# Patient Record
Sex: FEMALE | Race: WHITE | Employment: OTHER | RURAL
[De-identification: names, ages, dates, MRNs, and addresses within clinical notes are randomized per-mention and may not be internally consistent; named-entity substitution may affect disease eponyms.]

---

## 2017-01-19 RX ORDER — OMEPRAZOLE 20 MG/1
20 CAPSULE, DELAYED RELEASE ORAL DAILY
Qty: 90 CAP | Refills: 1 | Status: SHIPPED | OUTPATIENT
Start: 2017-01-19 | End: 2017-08-27

## 2017-01-19 RX ORDER — OMEPRAZOLE 20 MG/1
CAPSULE, DELAYED RELEASE ORAL
Qty: 90 CAP | Refills: 1 | Status: SHIPPED | OUTPATIENT
Start: 2017-01-19 | End: 2017-04-18 | Stop reason: SDUPTHER

## 2017-02-23 DIAGNOSIS — E78.00 PURE HYPERCHOLESTEROLEMIA: ICD-10-CM

## 2017-02-23 RX ORDER — ROSUVASTATIN CALCIUM 20 MG/1
TABLET, COATED ORAL
Qty: 90 TAB | Refills: 1 | Status: SHIPPED | OUTPATIENT
Start: 2017-02-23 | End: 2017-08-19 | Stop reason: SDUPTHER

## 2017-04-05 ENCOUNTER — OFFICE VISIT (OUTPATIENT)
Dept: FAMILY MEDICINE CLINIC | Age: 82
End: 2017-04-05

## 2017-04-05 VITALS
TEMPERATURE: 97.6 F | RESPIRATION RATE: 18 BRPM | WEIGHT: 129 LBS | OXYGEN SATURATION: 91 % | BODY MASS INDEX: 21.49 KG/M2 | HEIGHT: 65 IN | SYSTOLIC BLOOD PRESSURE: 125 MMHG | HEART RATE: 47 BPM | DIASTOLIC BLOOD PRESSURE: 81 MMHG

## 2017-04-05 DIAGNOSIS — I10 ESSENTIAL HYPERTENSION, BENIGN: ICD-10-CM

## 2017-04-05 DIAGNOSIS — G89.29 CHRONIC RIGHT SHOULDER PAIN: ICD-10-CM

## 2017-04-05 DIAGNOSIS — M25.511 CHRONIC RIGHT SHOULDER PAIN: ICD-10-CM

## 2017-04-05 DIAGNOSIS — E11.9 TYPE 2 DIABETES MELLITUS WITHOUT COMPLICATION, WITHOUT LONG-TERM CURRENT USE OF INSULIN (HCC): Primary | ICD-10-CM

## 2017-04-05 DIAGNOSIS — E78.00 PURE HYPERCHOLESTEROLEMIA: ICD-10-CM

## 2017-04-05 DIAGNOSIS — Z87.01 H/O: PNEUMONIA: ICD-10-CM

## 2017-04-05 RX ORDER — ALBUTEROL SULFATE 90 UG/1
POWDER, METERED RESPIRATORY (INHALATION)
Refills: 0 | COMMUNITY
Start: 2017-02-27 | End: 2017-08-29 | Stop reason: SDUPTHER

## 2017-04-05 NOTE — PROGRESS NOTES
Identified pt with two pt identifiers(name and ). Chief Complaint   Patient presents with    Follow-up    Shoulder Pain     Right shoulder to back        Health Maintenance Due   Topic    EYE EXAM RETINAL OR DILATED Q1     DTaP/Tdap/Td series (1 - Tdap)    GLAUCOMA SCREENING Q2Y     FOOT EXAM Q1     MICROALBUMIN Q1     HEMOGLOBIN A1C Q6M        Wt Readings from Last 3 Encounters:   17 129 lb (58.5 kg)   16 135 lb 14.4 oz (61.6 kg)   16 132 lb (59.9 kg)     Temp Readings from Last 3 Encounters:   17 97.6 °F (36.4 °C)   16 97.3 °F (36.3 °C) (Oral)   16 97.9 °F (36.6 °C) (Oral)     BP Readings from Last 3 Encounters:   17 125/81   16 126/63   16 106/58     Pulse Readings from Last 3 Encounters:   17 (!) 47   16 66   16 64         Learning Assessment:  :     Learning Assessment 2014   PRIMARY LEARNER Patient   HIGHEST LEVEL OF EDUCATION - PRIMARY LEARNER  DID NOT GRADUATE HIGH SCHOOL   BARRIERS PRIMARY LEARNER NONE   CO-LEARNER CAREGIVER No   PRIMARY LANGUAGE ENGLISH   LEARNER PREFERENCE PRIMARY LISTENING   ANSWERED BY patient    RELATIONSHIP SELF       Depression Screening:  :     PHQ 2 / 9, over the last two weeks 2017   Little interest or pleasure in doing things Not at all   Feeling down, depressed or hopeless Not at all   Total Score PHQ 2 0       Fall Risk Assessment:  :     Fall Risk Assessment, last 12 mths 2017   Able to walk? Yes   Fall in past 12 months? Yes   Fall with injury? No   Number of falls in past 12 months 1   Fall Risk Score 1       Abuse Screening:  :     Abuse Screening Questionnaire 2017   Do you ever feel afraid of your partner? N N   Are you in a relationship with someone who physically or mentally threatens you? N N   Is it safe for you to go home? Y Y       Coordination of Care Questionnaire:  :     1) Have you been to an emergency room, urgent care clinic since your last visit? no   Hospitalized since your last visit? no             2) Have you seen or consulted any other health care providers outside of [facility] since your last visit? yes urgent care  (Include any pap smears or colon screenings in this section.)    3) Do you have an Advance Directive on file? yes  Are you interested in receiving information about Advance Directives? No    Reviewed record in preparation for visit and have obtained necessary documentation. Medication reconciliation up to date and corrected with patient at this time.

## 2017-04-05 NOTE — MR AVS SNAPSHOT
Visit Information Date & Time Provider Department Dept. Phone Encounter #  
 4/5/2017 11:15 AM Richy Vee Khurram [phone] [id number] Upcoming Health Maintenance Date Due  
 EYE EXAM RETINAL OR DILATED Q1 10/3/2014 GLAUCOMA SCREENING Q2Y 10/3/2015 FOOT EXAM Q1 2/26/2017 MICROALBUMIN Q1 2/26/2017 HEMOGLOBIN A1C Q6M 4/5/2017 MEDICARE YEARLY EXAM 9/28/2017 LIPID PANEL Q1 10/5/2017 DTaP/Tdap/Td series (2 - Td) 4/5/2027 Allergies as of 4/5/2017  Review Complete On: 4/5/2017 By: Donna Moore LPN Severity Noted Reaction Type Reactions Advil Pm  [Ibuprofen-diphenhydramine Cit] Medium 09/27/2016 Other reaction(s): Adverse reaction to substance , Hives Codeine  07/14/2009    Other (comments) Causes stomach pains Current Immunizations  Reviewed on 4/5/2017 Name Date H1N1 FLU VACCINE 2/11/2010 Influenza High Dose Vaccine PF 9/27/2016 11:30 AM  
 Influenza Vaccine 9/30/2014, 9/28/2013 Influenza Vaccine Split 10/2/2012  2:22 PM, 11/19/2009 Influenza Vaccine Whole 12/20/2010 Pneumococcal Conjugate (PCV-13) 4/9/2015 11:15 AM  
 Pneumococcal Vaccine (Unspecified Type) 4/8/2008 Td, Adsorbed PF 4/9/2015 11:17 AM  
 Zoster Vaccine, Live 4/13/2015 Reviewed by Ambrosio Vidal MD on 4/5/2017 at 11:48 AM  
You Were Diagnosed With   
  
 Codes Comments Type 2 diabetes mellitus without complication, without long-term current use of insulin (HCC)    -  Primary ICD-10-CM: E11.9 ICD-9-CM: 250.00 Essential hypertension, benign     ICD-10-CM: I10 
ICD-9-CM: 401.1 Pure hypercholesterolemia     ICD-10-CM: E78.00 ICD-9-CM: 272.0   
 H/O: pneumonia     ICD-10-CM: Z87.01 
ICD-9-CM: V12.61 Chronic right shoulder pain     ICD-10-CM: M25.511, G89.29 ICD-9-CM: 719.41, 338.29 Vitals BP Pulse Temp Resp Height(growth percentile) Weight(growth percentile) 125/81 (BP 1 Location: Right arm, BP Patient Position: Sitting) (!) 47 97.6 °F (36.4 °C) 18 5' 5\" (1.651 m) 129 lb (58.5 kg) SpO2 BMI OB Status Smoking Status 91% 21.47 kg/m2 Postmenopausal Never Smoker BMI and BSA Data Body Mass Index Body Surface Area  
 21.47 kg/m 2 1.64 m 2 Preferred Pharmacy Pharmacy Name Phone [id number]- [address], [id number] [place] [address]  [phone] Your Updated Medication List  
  
   
This list is accurate as of: 4/5/17 11:58 AM.  Always use your most recent med list.  
  
  
  
  
 alendronate 70 mg tablet Commonly known as:  FOSAMAX TAKE 1 TABLET BY MOUTH EVERY SUNDAY FOR 30 DAYS  
  
 aspirin delayed-release 81 mg tablet  
take 81 mg by mouth daily. Blood-Glucose Meter monitoring kit ONE TOUCH GLUCOMETER. CALCIUM 500 500 mg calcium (1,250 mg) tablet Generic drug:  calcium carbonate Take 1,250 mg by mouth daily. cetirizine 10 mg tablet Commonly known as:  ZYRTEC Take 10 mg by mouth daily. glucose blood VI test strips strip Commonly known as:  blood glucose test  
One touch ultra - dx E11.9  test blood sugar once daily or as directed by physician Lancets Misc Commonly known as: One Touch Talia Kldelicia Test blood sugar daily  
  
 levothyroxine 50 mcg tablet Commonly known as:  SYNTHROID  
TAKE 1 TABLET BY MOUTH EVERY DAY BEFORE BREAKFAST  
  
 losartan 50 mg tablet Commonly known as:  COZAAR  
TAKE 1 TABLET BY MOUTH EVERY DAY  
  
 * omeprazole 20 mg capsule Commonly known as:  PRILOSEC Take 1 Cap by mouth daily for 30 days. * omeprazole 20 mg capsule Commonly known as:  PRILOSEC  
TAKE 2 CAPSULES BY MOUTH EVERY DAY  
  
 PROAIR [address] 90 mcg/actuation Aepb Generic drug:  albuterol sulfate USE 2 INHALATIONS BY MOUTH EVERY 4 TO 6 HOURS AS NEEDED PROTEIN NUTRITIONAL SHAKE PO Take 1 Bottle by mouth daily. rosuvastatin 20 mg tablet Commonly known as:  CRESTOR  
TAKE 1 TABLET BY MOUTH EVERY DAY  
  
 VITAMIN D3 1,000 unit tablet Generic drug:  cholecalciferol Take 1,000 Units by mouth daily. * Notice: This list has 2 medication(s) that are the same as other medications prescribed for you. Read the directions carefully, and ask your doctor or other care provider to review them with you. We Performed the Following CBC WITH AUTOMATED DIFF [31483 CPT(R)] CRP, HIGH SENSITIVITY [62947 CPT(R)] HEMOGLOBIN A1C WITH EAG [92685 CPT(R)] LIPID PANEL [67054 CPT(R)] METABOLIC PANEL, COMPREHENSIVE [39361 CPT(R)] MICROALBUMIN, UR, RAND W/ MICROALBUMIN/CREA RATIO [id number] CPT(R)] REFERRAL TO ORTHOPEDICS [AND307 Custom] Comments:  
 Please evaluate patient for worsening R-shoulder pain and not getting better. Referral Information Referral ID Referred By Referred To  
  
 [id number] [address] Phone: [phone] Fax: [phone] Visits Status Start Date End Date 1 New Request 4/5/17 4/5/18 If your referral has a status of pending review or denied, additional information will be sent to support the outcome of this decision. Introducing [facility]! [employer] introduces [employer] patient portal. Now you can access parts of your medical record, email your doctor's office, and request medication refills online. 1. In your internet browser, go to [email or web address] [email or web address] 2. Click on the First Time User? Click Here link in the Sign In box. You will see the New Member Sign Up page. 3. Enter your [employer] Access Code exactly as it appears below. You will not need to use this code after youve completed the sign-up process. If you do not sign up before the expiration date, you must request a new code.  
 
· [employer] Access Code: [email or web address] 
 Expires: 7/4/2017 11:58 AM 
 
4. Enter the last four digits of your Social Security Number (xxxx) and Date of Birth (mm/dd/yyyy) as indicated and click Submit. You will be taken to the next sign-up page. 5. Create a [employer] ID. This will be your [employer] login ID and cannot be changed, so think of one that is secure and easy to remember. 6. Create a [employer] password. You can change your password at any time. 7. Enter your Password Reset Question and Answer. This can be used at a later time if you forget your password. 8. Enter your e-mail address. You will receive e-mail notification when new information is available in [address] 9. Click Sign Up. You can now view and download portions of your medical record. 10. Click the Download Summary menu link to download a portable copy of your medical information. If you have questions, please visit the Frequently Asked Questions section of the [employer] website. Remember, [employer] is NOT to be used for urgent needs. For medical emergencies, dial 911. Now available from your iPhone and Android! Please provide this summary of care documentation to your next provider. Your primary care clinician is listed as [address]. If you have any questions after today's visit, please call [phone].

## 2017-04-07 ENCOUNTER — HOSPITAL ENCOUNTER (OUTPATIENT)
Dept: LAB | Age: 82
Discharge: HOME | End: 2017-04-07
Payer: MEDICARE

## 2017-04-07 ENCOUNTER — LAB ONLY (OUTPATIENT)
Dept: FAMILY MEDICINE CLINIC | Age: 82
End: 2017-04-07

## 2017-04-07 PROCEDURE — 36415 COLL VENOUS BLD VENIPUNCTURE: CPT

## 2017-04-07 PROCEDURE — 80061 LIPID PANEL: CPT

## 2017-04-07 PROCEDURE — 86141 C-REACTIVE PROTEIN HS: CPT

## 2017-04-07 PROCEDURE — 85025 COMPLETE CBC W/AUTO DIFF WBC: CPT

## 2017-04-07 PROCEDURE — 82043 UR ALBUMIN QUANTITATIVE: CPT

## 2017-04-07 PROCEDURE — 80053 COMPREHEN METABOLIC PANEL: CPT

## 2017-04-07 PROCEDURE — 83036 HEMOGLOBIN GLYCOSYLATED A1C: CPT

## 2017-04-08 LAB
ALBUMIN SERPL-MCNC: 3.9 G/DL (ref 3.2–4.6)
ALBUMIN/CREAT UR: 6 MG/G CREAT (ref 0–30)
ALBUMIN/GLOB SERPL: 1.6 {RATIO} (ref 1.2–2.2)
ALP SERPL-CCNC: 62 IU/L (ref 39–117)
ALT SERPL-CCNC: 16 IU/L (ref 0–32)
AST SERPL-CCNC: 31 IU/L (ref 0–40)
BASOPHILS # BLD AUTO: 0.1 X10E3/UL (ref 0–0.2)
BASOPHILS NFR BLD AUTO: 1 %
BILIRUB SERPL-MCNC: 0.4 MG/DL (ref 0–1.2)
BUN SERPL-MCNC: 12 MG/DL (ref 10–36)
BUN/CREAT SERPL: 14 (ref 12–28)
CALCIUM SERPL-MCNC: 8.7 MG/DL (ref 8.7–10.3)
CHLORIDE SERPL-SCNC: 104 MMOL/L (ref 96–106)
CHOLEST SERPL-MCNC: 113 MG/DL (ref 100–199)
CO2 SERPL-SCNC: 25 MMOL/L (ref 18–29)
CREAT SERPL-MCNC: 0.84 MG/DL (ref 0.57–1)
CREAT UR-MCNC: 62.9 MG/DL
CRP SERPL HS-MCNC: 0.63 MG/L (ref 0–3)
EOSINOPHIL # BLD AUTO: 0.1 X10E3/UL (ref 0–0.4)
EOSINOPHIL NFR BLD AUTO: 2 %
ERYTHROCYTE [DISTWIDTH] IN BLOOD BY AUTOMATED COUNT: 14.7 % (ref 12.3–15.4)
EST. AVERAGE GLUCOSE BLD GHB EST-MCNC: 123 MG/DL
GLOBULIN SER CALC-MCNC: 2.5 G/DL (ref 1.5–4.5)
GLUCOSE SERPL-MCNC: 93 MG/DL (ref 65–99)
HBA1C MFR BLD: 5.9 % (ref 4.8–5.6)
HCT VFR BLD AUTO: 44 % (ref 34–46.6)
HDLC SERPL-MCNC: 43 MG/DL
HGB BLD-MCNC: 14.3 G/DL (ref 11.1–15.9)
IMM GRANULOCYTES # BLD: 0 X10E3/UL (ref 0–0.1)
IMM GRANULOCYTES NFR BLD: 0 %
INTERPRETATION, 910389: NORMAL
LDLC SERPL CALC-MCNC: 39 MG/DL (ref 0–99)
LYMPHOCYTES # BLD AUTO: 1.9 X10E3/UL (ref 0.7–3.1)
LYMPHOCYTES NFR BLD AUTO: 29 %
Lab: NORMAL
MCH RBC QN AUTO: 30 PG (ref 26.6–33)
MCHC RBC AUTO-ENTMCNC: 32.5 G/DL (ref 31.5–35.7)
MCV RBC AUTO: 92 FL (ref 79–97)
MICROALBUMIN UR-MCNC: 3.8 UG/ML
MONOCYTES # BLD AUTO: 0.4 X10E3/UL (ref 0.1–0.9)
MONOCYTES NFR BLD AUTO: 6 %
NEUTROPHILS # BLD AUTO: 4 X10E3/UL (ref 1.4–7)
NEUTROPHILS NFR BLD AUTO: 62 %
PLATELET # BLD AUTO: 204 X10E3/UL (ref 150–379)
POTASSIUM SERPL-SCNC: 4.5 MMOL/L (ref 3.5–5.2)
PROT SERPL-MCNC: 6.4 G/DL (ref 6–8.5)
RBC # BLD AUTO: 4.77 X10E6/UL (ref 3.77–5.28)
SODIUM SERPL-SCNC: 143 MMOL/L (ref 134–144)
TRIGL SERPL-MCNC: 155 MG/DL (ref 0–149)
VLDLC SERPL CALC-MCNC: 31 MG/DL (ref 5–40)
WBC # BLD AUTO: 6.6 X10E3/UL (ref 3.4–10.8)

## 2017-04-13 ENCOUNTER — TELEPHONE (OUTPATIENT)
Dept: FAMILY MEDICINE CLINIC | Age: 82
End: 2017-04-13

## 2017-04-13 NOTE — PROGRESS NOTES
Labs stable. No major concerns. Continue with current medications. Return in 6-months for follow up and labs.

## 2017-04-13 NOTE — TELEPHONE ENCOUNTER
----- Message from Anais Rivero MD sent at 4/13/2017  1:16 PM EDT -----  Labs stable. No major concerns. Continue with current medications. Return in 6-months for follow up and labs.

## 2017-04-18 RX ORDER — BLOOD SUGAR DIAGNOSTIC
STRIP MISCELLANEOUS
Qty: 100 STRIP | Refills: 0 | Status: SHIPPED | OUTPATIENT
Start: 2017-04-18 | End: 2017-04-18 | Stop reason: SDUPTHER

## 2017-04-18 RX ORDER — OMEPRAZOLE 20 MG/1
CAPSULE, DELAYED RELEASE ORAL
Qty: 90 CAP | Refills: 1 | Status: SHIPPED | OUTPATIENT
Start: 2017-04-18 | End: 2017-07-16 | Stop reason: SDUPTHER

## 2017-04-20 RX ORDER — LANCETS
EACH MISCELLANEOUS
Qty: 1 PACKAGE | Refills: 11 | Status: SHIPPED | OUTPATIENT
Start: 2017-04-20 | End: 2017-10-10 | Stop reason: ALTCHOICE

## 2017-04-21 NOTE — PROGRESS NOTES
HISTORY OF PRESENT ILLNESS  Stephon Green is a 80 y.o. female. HPI Comments: Who presents today for routine follow up. She also continues to c/o right shoulder to back pain. Known to have OA which flares. She is hesitant to consider any procedures, but does have an appointment coming up with ortho in the next few weeks. Follow-up     Shoulder Pain            ROS:  Review of Systems   All other systems reviewed and are negative. Visit Vitals    /81 (BP 1 Location: Right arm, BP Patient Position: Sitting)  Comment: auto cuff    Pulse (!) 47    Temp 97.6 °F (36.4 °C)    Resp 18    Ht 5' 5\" (1.651 m)    Wt 129 lb (58.5 kg)    SpO2 91%    BMI 21.47 kg/m2   Physical Exam   HENT:   Head: Atraumatic. Eyes: Conjunctivae are normal. Pupils are equal, round, and reactive to light. Cardiovascular: Normal rate and regular rhythm. Pulmonary/Chest: Effort normal and breath sounds normal.   Musculoskeletal: Normal range of motion. She exhibits tenderness (right shoulder). Neurological: She is alert. Skin: Skin is warm. Nursing note and vitals reviewed. ASSESSMENT and PLAN    ICD-10-CM ICD-9-CM    1. Type 2 diabetes mellitus without complication, without long-term current use of insulin (HCC) E11.9 250.00 MICROALBUMIN, UR, RAND W/ MICROALBUMIN/CREA RATIO      HEMOGLOBIN A1C WITH EAG   2. Essential hypertension, benign F11 561.9 METABOLIC PANEL, COMPREHENSIVE   3. Pure hypercholesterolemia E78.00 272.0 LIPID PANEL      CRP, HIGH SENSITIVITY      METABOLIC PANEL, COMPREHENSIVE      CBC WITH AUTOMATED DIFF   4. H/O: pneumonia Z87.01 V12.61    5. Chronic right shoulder pain M25.511 719.41 REFERRAL TO ORTHOPEDICS    G89.29 338.29      Will obtain labs as outlined above. Encouraged her to see ortho to determine if she could benefit from an injection. Would like to consider PT as well, but she doesn't feel it would be helpful. Will advise when her labs return.      Pt verbalizes understanding of plan of care and denies further questions or concerns at this time. Follow-up Disposition:  Return if symptoms worsen or fail to improve.

## 2017-05-16 RX ORDER — LOSARTAN POTASSIUM 50 MG/1
TABLET ORAL
Qty: 90 TAB | Refills: 1 | Status: SHIPPED | OUTPATIENT
Start: 2017-05-16 | End: 2017-11-09 | Stop reason: SDUPTHER

## 2017-06-02 RX ORDER — LEVOTHYROXINE SODIUM 50 UG/1
TABLET ORAL
Qty: 90 TAB | Refills: 1 | Status: SHIPPED | OUTPATIENT
Start: 2017-06-02 | End: 2017-11-26 | Stop reason: SDUPTHER

## 2017-07-17 RX ORDER — OMEPRAZOLE 20 MG/1
CAPSULE, DELAYED RELEASE ORAL
Qty: 90 CAP | Refills: 1 | Status: SHIPPED | OUTPATIENT
Start: 2017-07-17 | End: 2017-10-17 | Stop reason: SDUPTHER

## 2017-08-19 DIAGNOSIS — E78.00 PURE HYPERCHOLESTEROLEMIA: ICD-10-CM

## 2017-08-21 RX ORDER — ROSUVASTATIN CALCIUM 20 MG/1
TABLET, COATED ORAL
Qty: 90 TAB | Refills: 1 | Status: SHIPPED | OUTPATIENT
Start: 2017-08-21 | End: 2018-02-12 | Stop reason: SDUPTHER

## 2017-08-23 ENCOUNTER — APPOINTMENT (OUTPATIENT)
Dept: GENERAL RADIOLOGY | Age: 82
DRG: 871 | End: 2017-08-23
Attending: EMERGENCY MEDICINE
Payer: MEDICARE

## 2017-08-23 ENCOUNTER — APPOINTMENT (OUTPATIENT)
Dept: CT IMAGING | Age: 82
DRG: 871 | End: 2017-08-23
Attending: FAMILY MEDICINE
Payer: MEDICARE

## 2017-08-23 ENCOUNTER — HOSPITAL ENCOUNTER (INPATIENT)
Age: 82
LOS: 3 days | Discharge: HOME | DRG: 871 | End: 2017-08-27
Attending: EMERGENCY MEDICINE | Admitting: FAMILY MEDICINE
Payer: MEDICARE

## 2017-08-23 DIAGNOSIS — A41.9 SEPSIS, DUE TO UNSPECIFIED ORGANISM: ICD-10-CM

## 2017-08-23 DIAGNOSIS — J18.9 COMMUNITY ACQUIRED PNEUMONIA: Primary | ICD-10-CM

## 2017-08-23 LAB
ALBUMIN SERPL-MCNC: 3.1 G/DL (ref 3.5–5)
ALBUMIN/GLOB SERPL: 0.9 {RATIO} (ref 1.1–2.2)
ALP SERPL-CCNC: 57 U/L (ref 45–117)
ALT SERPL-CCNC: 25 U/L (ref 12–78)
ANION GAP SERPL CALC-SCNC: 7 MMOL/L (ref 5–15)
APPEARANCE UR: CLEAR
AST SERPL-CCNC: 42 U/L (ref 15–37)
BACTERIA URNS QL MICRO: NEGATIVE /HPF
BASOPHILS # BLD: 0 K/UL (ref 0–0.1)
BASOPHILS NFR BLD: 0 % (ref 0–1)
BILIRUB SERPL-MCNC: 0.3 MG/DL (ref 0.2–1)
BILIRUB UR QL: NEGATIVE
BUN SERPL-MCNC: 18 MG/DL (ref 6–20)
BUN/CREAT SERPL: 15 (ref 12–20)
CALCIUM SERPL-MCNC: 8.5 MG/DL (ref 8.5–10.1)
CHLORIDE SERPL-SCNC: 102 MMOL/L (ref 97–108)
CO2 SERPL-SCNC: 26 MMOL/L (ref 21–32)
COLOR UR: ABNORMAL
CREAT SERPL-MCNC: 1.24 MG/DL (ref 0.55–1.02)
EOSINOPHIL # BLD: 0 K/UL (ref 0–0.4)
EOSINOPHIL NFR BLD: 0 % (ref 0–7)
EPITH CASTS URNS QL MICRO: ABNORMAL /LPF
ERYTHROCYTE [DISTWIDTH] IN BLOOD BY AUTOMATED COUNT: 13.6 % (ref 11.5–14.5)
GLOBULIN SER CALC-MCNC: 3.4 G/DL (ref 2–4)
GLUCOSE SERPL-MCNC: 149 MG/DL (ref 65–100)
GLUCOSE UR STRIP.AUTO-MCNC: NEGATIVE MG/DL
HCT VFR BLD AUTO: 40.7 % (ref 35–47)
HGB BLD-MCNC: 13.4 G/DL (ref 11.5–16)
HGB UR QL STRIP: ABNORMAL
HYALINE CASTS URNS QL MICRO: ABNORMAL /LPF (ref 0–5)
KETONES UR QL STRIP.AUTO: 15 MG/DL
LACTATE SERPL-SCNC: 1.4 MMOL/L (ref 0.4–2)
LEUKOCYTE ESTERASE UR QL STRIP.AUTO: ABNORMAL
LYMPHOCYTES # BLD: 1.4 K/UL (ref 0.8–3.5)
LYMPHOCYTES NFR BLD: 11 % (ref 12–49)
MCH RBC QN AUTO: 30.9 PG (ref 26–34)
MCHC RBC AUTO-ENTMCNC: 32.9 G/DL (ref 30–36.5)
MCV RBC AUTO: 93.8 FL (ref 80–99)
MONOCYTES # BLD: 1.1 K/UL (ref 0–1)
MONOCYTES NFR BLD: 8 % (ref 5–13)
NEUTS SEG # BLD: 11.1 K/UL (ref 1.8–8)
NEUTS SEG NFR BLD: 81 % (ref 32–75)
NITRITE UR QL STRIP.AUTO: NEGATIVE
PH UR STRIP: 5 [PH] (ref 5–8)
PLATELET # BLD AUTO: 163 K/UL (ref 150–400)
POTASSIUM SERPL-SCNC: 3.8 MMOL/L (ref 3.5–5.1)
PROT SERPL-MCNC: 6.5 G/DL (ref 6.4–8.2)
PROT UR STRIP-MCNC: 30 MG/DL
RBC # BLD AUTO: 4.34 M/UL (ref 3.8–5.2)
RBC #/AREA URNS HPF: ABNORMAL /HPF (ref 0–5)
SODIUM SERPL-SCNC: 135 MMOL/L (ref 136–145)
SP GR UR REFRACTOMETRY: 1.03 (ref 1–1.03)
TROPONIN I SERPL-MCNC: <0.04 NG/ML
UA: UC IF INDICATED,UAUC: ABNORMAL
UROBILINOGEN UR QL STRIP.AUTO: 0.2 EU/DL (ref 0.2–1)
WBC # BLD AUTO: 13.6 K/UL (ref 3.6–11)
WBC URNS QL MICRO: ABNORMAL /HPF (ref 0–4)

## 2017-08-23 PROCEDURE — 96365 THER/PROPH/DIAG IV INF INIT: CPT

## 2017-08-23 PROCEDURE — 96360 HYDRATION IV INFUSION INIT: CPT

## 2017-08-23 PROCEDURE — 71010 XR CHEST PORT: CPT

## 2017-08-23 PROCEDURE — 71250 CT THORAX DX C-: CPT

## 2017-08-23 PROCEDURE — 87086 URINE CULTURE/COLONY COUNT: CPT | Performed by: EMERGENCY MEDICINE

## 2017-08-23 PROCEDURE — 96375 TX/PRO/DX INJ NEW DRUG ADDON: CPT

## 2017-08-23 PROCEDURE — 94762 N-INVAS EAR/PLS OXIMTRY CONT: CPT

## 2017-08-23 PROCEDURE — 74011000250 HC RX REV CODE- 250: Performed by: EMERGENCY MEDICINE

## 2017-08-23 PROCEDURE — 85025 COMPLETE CBC W/AUTO DIFF WBC: CPT | Performed by: EMERGENCY MEDICINE

## 2017-08-23 PROCEDURE — 80053 COMPREHEN METABOLIC PANEL: CPT | Performed by: EMERGENCY MEDICINE

## 2017-08-23 PROCEDURE — 74011000258 HC RX REV CODE- 258: Performed by: EMERGENCY MEDICINE

## 2017-08-23 PROCEDURE — 81001 URINALYSIS AUTO W/SCOPE: CPT | Performed by: EMERGENCY MEDICINE

## 2017-08-23 PROCEDURE — 94640 AIRWAY INHALATION TREATMENT: CPT

## 2017-08-23 PROCEDURE — 36415 COLL VENOUS BLD VENIPUNCTURE: CPT | Performed by: EMERGENCY MEDICINE

## 2017-08-23 PROCEDURE — 93005 ELECTROCARDIOGRAM TRACING: CPT

## 2017-08-23 PROCEDURE — 83605 ASSAY OF LACTIC ACID: CPT | Performed by: EMERGENCY MEDICINE

## 2017-08-23 PROCEDURE — 84484 ASSAY OF TROPONIN QUANT: CPT | Performed by: EMERGENCY MEDICINE

## 2017-08-23 PROCEDURE — 87040 BLOOD CULTURE FOR BACTERIA: CPT | Performed by: EMERGENCY MEDICINE

## 2017-08-23 PROCEDURE — 99285 EMERGENCY DEPT VISIT HI MDM: CPT

## 2017-08-23 PROCEDURE — 77030029684 HC NEB SM VOL KT MONA -A

## 2017-08-23 PROCEDURE — 74011250636 HC RX REV CODE- 250/636: Performed by: EMERGENCY MEDICINE

## 2017-08-23 RX ORDER — SODIUM CHLORIDE 0.9 % (FLUSH) 0.9 %
5-10 SYRINGE (ML) INJECTION EVERY 8 HOURS
Status: DISCONTINUED | OUTPATIENT
Start: 2017-08-23 | End: 2017-08-27 | Stop reason: HOSPADM

## 2017-08-23 RX ORDER — SODIUM CHLORIDE 9 MG/ML
100 INJECTION, SOLUTION INTRAVENOUS CONTINUOUS
Status: DISCONTINUED | OUTPATIENT
Start: 2017-08-23 | End: 2017-08-25

## 2017-08-23 RX ORDER — SODIUM CHLORIDE 0.9 % (FLUSH) 0.9 %
5-10 SYRINGE (ML) INJECTION AS NEEDED
Status: DISCONTINUED | OUTPATIENT
Start: 2017-08-23 | End: 2017-08-27 | Stop reason: HOSPADM

## 2017-08-23 RX ORDER — HEPARIN SODIUM 5000 [USP'U]/ML
5000 INJECTION, SOLUTION INTRAVENOUS; SUBCUTANEOUS EVERY 8 HOURS
Status: DISCONTINUED | OUTPATIENT
Start: 2017-08-23 | End: 2017-08-24

## 2017-08-23 RX ADMIN — ALBUTEROL SULFATE 1 DOSE: 2.5 SOLUTION RESPIRATORY (INHALATION) at 21:26

## 2017-08-23 RX ADMIN — AZITHROMYCIN MONOHYDRATE 500 MG: 500 INJECTION, POWDER, LYOPHILIZED, FOR SOLUTION INTRAVENOUS at 23:22

## 2017-08-23 RX ADMIN — CEFTRIAXONE SODIUM 1 G: 1 INJECTION, POWDER, FOR SOLUTION INTRAMUSCULAR; INTRAVENOUS at 22:08

## 2017-08-23 RX ADMIN — SODIUM CHLORIDE 1659 ML: 900 INJECTION, SOLUTION INTRAVENOUS at 22:10

## 2017-08-23 NOTE — IP AVS SNAPSHOT
Allan Purcell 
 
 
 [facility] [place] [address] 
[phone] Patient: Yen Jackson MRN: [id number] :[id number] You are allergic to the following Allergen Reactions Advil Pm  (Ibuprofen-Diphenhydramine Cit) Not Noted Other reaction(s): Adverse reaction to substance , Hives Codeine Other (comments) Causes stomach pains Recent Documentation Height Weight Breastfeeding? BMI OB Status Smoking Status 1.6 m 55.3 kg No 21.6 kg/m2 Postmenopausal Never Smoker Unresulted Labs Order Current Status CULTURE, BLOOD, PERIPHERAL Preliminary result CULTURE, BLOOD, PERIPHERAL Preliminary result Emergency Contacts Name Discharge Info Relation Home Work Mobile 301 [facility] CAREGIVER [3] Child [2] [phone] About your hospitalization You were admitted on:  2017 You last received care in the:  [facility] 4M POST SURG ORT 2 You were discharged on:  2017 Unit phone number:  [phone] Why you were hospitalized Your primary diagnosis was:  Sepsis (Hcc) Your diagnoses also included:  Cap (Community Acquired Pneumonia) Providers Seen During Your Hospitalizations Provider Role Specialty Primary office phone Vilma Flowers MD Attending Provider Emergency Medicine [phone] María Velazco MD Attending Provider [facility] [phone] Breonna Wharton MD Attending Provider Family Practice [phone] Your Primary Care Physician (PCP) Primary Care Physician Office Phone Office Fax Jacey Mullins [phone] Follow-up Information Follow up With Details Comments Contact Info Thalia Baez MD Schedule an appointment as soon as possible for a visit in 3 days hospital follow up Margaret  [address] [place] [address] 
[phone] Your Appointments Friday September 29, 2017 10:00 AM EDT Medicare Physical with Giorgi Guzmán MD  
[address] [place] [address]) Margaret [address] [facility]  
[phone] Current Discharge Medication List  
  
START taking these medications Dose & Instructions Dispensing Information Comments Morning Noon Evening Bedtime  
 doxycycline 100 mg tablet Commonly known as:  VIBRA-TABS Your last dose was: Your next dose is:    
   
   
 Dose:  100 mg Take 1 Tab by mouth every twelve (12) hours for 6 days. Quantity:  12 Tab Refills:  0  
     
   
   
   
  
 levalbuterol 1.25 mg/3 mL Nebu Commonly known as:  Nathalia Gall Your last dose was: Your next dose is:    
   
   
 Dose:  1.25 mg  
3 mL by Nebulization route every six (6) hours as needed for up to 30 days. Quantity:  180 mL Refills:  0 CONTINUE these medications which have CHANGED Dose & Instructions Dispensing Information Comments Morning Noon Evening Bedtime  
 omeprazole 20 mg capsule Commonly known as:  PRILOSEC What changed:  Another medication with the same name was removed. Continue taking this medication, and follow the directions you see here. Your last dose was: Your next dose is: TAKE 2 CAPSULES BY MOUTH EVERY DAY Quantity:  90 Cap Refills:  1 CONTINUE these medications which have NOT CHANGED Dose & Instructions Dispensing Information Comments Morning Noon Evening Bedtime  
 alendronate 70 mg tablet Commonly known as:  FOSAMAX Your last dose was: Your next dose is: TAKE 1 TABLET BY MOUTH EVERY SUNDAY FOR 30 DAYS Quantity:  4 Tab Refills:  11  
     
   
   
   
  
 aspirin delayed-release 81 mg tablet Your last dose was: Your next dose is:    
   
   
 Dose:  81 mg  
take 81 mg by mouth daily. Refills:  0 Blood-Glucose Meter monitoring kit Your last dose was: Your next dose is: ONE TOUCH GLUCOMETER. Quantity:  1 Kit Refills:  0  
     
   
   
   
  
 CALCIUM 500 500 mg calcium (1,250 mg) tablet Generic drug:  calcium carbonate Your last dose was: Your next dose is:    
   
   
 Dose:  1250 mg Take 1,250 mg by mouth daily. Refills:  0  
     
   
   
   
  
 cetirizine 10 mg tablet Commonly known as:  ZYRTEC Your last dose was: Your next dose is:    
   
   
 Dose:  10 mg Take 10 mg by mouth daily. Refills:  0  
     
   
   
   
  
 glucose blood VI test strips strip Commonly known as:  ONETOUCH ULTRA TEST Your last dose was: Your next dose is:    
   
   
 TEST ONCE A DAY/Dx Code: E11.9 Quantity:  100 Strip Refills:  1 Lancets Misc Your last dose was: Your next dose is:    
   
   
 TEST ONCE A DAY/Dx Code: E11.9 Quantity:  1 Package Refills:  11  
     
   
   
   
  
 levothyroxine 50 mcg tablet Commonly known as:  SYNTHROID Your last dose was: Your next dose is: TAKE 1 TABLET BY MOUTH EVERY DAY BEFORE BREAKFAST Quantity:  90 Tab Refills:  1  
     
   
   
   
  
 losartan 50 mg tablet Commonly known as:  COZAAR Your last dose was: Your next dose is: TAKE 1 TABLET BY MOUTH EVERY DAY Quantity:  90 Tab Refills:  1 PROAIR RESPICLICK 90 mcg/actuation Aepb Generic drug:  albuterol sulfate Your last dose was: Your next dose is:    
   
   
 USE 2 INHALATIONS BY MOUTH EVERY 4 TO 6 HOURS AS NEEDED Refills:  0 PROTEIN NUTRITIONAL SHAKE PO Your last dose was: Your next dose is:    
   
   
 Dose:  1 Bottle Take 1 Bottle by mouth daily. Refills:  0  
     
   
   
   
  
 rosuvastatin 20 mg tablet Commonly known as:  CRESTOR Your last dose was: Your next dose is: TAKE 1 TABLET BY MOUTH EVERY DAY Quantity:  90 Tab Refills:  1 VITAMIN D3 1,000 unit tablet Generic drug:  cholecalciferol Your last dose was: Your next dose is:    
   
   
 Dose:  1000 Units Take 1,000 Units by mouth daily. Refills:  0 Where to Get Your Medications Information on where to get these meds will be given to you by the nurse or doctor. ! Ask your nurse or doctor about these medications  
  doxycycline 100 mg tablet  
 levalbuterol 1.25 mg/3 mL Nebu Discharge Instructions HOME DISCHARGE INSTRUCTIONS Rosemary Higgins / [id number] : [id number] Admission date: 2017 Discharge date: 2017 Please bring this form with you to show your care provider at your follow-up appointment. Primary care provider:  Nichelle Edwards MD 
 
Discharging provider:  Bryan Negro MD  - Family Medicine Resident Jermaine Gonzales MD - Attending, Family Medicine You have been admitted to the hospital with the following diagnoses: 
 
ACUTE DIAGNOSES: 
· Sepsis ([address] · CAP (community acquired pneumonia) · CAP (community acquired pneumonia) Michaelyn Daily . . . . . . . . . . . . . . . . . . . . . . . . . . . . . . . . . . . . . . . . . . . . . . . . . . . . . . . . . . . . . . . . . . . . . . Michaelyn Daily FOLLOW-UP CARE RECOMMENDATIONS: 
 
Medication changes: none Take Doxycycline 100mg twice a day for 6 more days. Follow-up Information Follow up With Details Comments Contact Info Nichelle Edwards MD Schedule an appointment as soon as possible for a visit in 3 days hospital follow up [address] [facility] 
[phone] Appointments: Listed on page 2 of these documents. Follow-up tests needed: none Pending test results: At the time of your discharge the following test results are still pending: none. Please make sure you review these results with your outpatient follow-up provider(s). Specific symptoms to watch for: chest pain, shortness of breath, fever, chills, nausea, vomiting, diarrhea, change in mentation, falling, weakness, bleeding. DIET/what to eat: Diabetic Diet ACTIVITY:  Activity as tolerated Wound care: none Equipment needed:  none What to do if new or unexpected symptoms occur? If you experience any of the above symptoms (or should other concerns or questions arise after discharge) please call your primary care physician. Return to the emergency room if you cannot get hold of your doctor. · It is very important that you keep your follow-up appointment(s). · Please bring discharge papers, medication list (and/or medication bottles) to your follow-up appointments for review by your outpatient provider(s). · Please check the list of medications and be sure it includes every medication (even non-prescription medications) that your provider wants you to take. · It is important that you take the medication exactly as they are prescribed. · Keep your medication in the bottles provided by the pharmacist and keep a list of the medication names, dosages, and times to be taken in your wallet. · Do not take other medications without consulting your doctor. · If you have any questions about your medications or other instructions, please talk to your nurse or care provider before you leave the hospital.  
 
Information obtained by:  
 
I understand that if any problems occur once I am at home I am to contact my physician. These instructions were explained to me and I had the opportunity to ask questions. I understand and acknowledge receipt of the instructions indicated above. Physician's or R.N.'s Signature                                                                  Date/Time Patient or Representative Signature                                                          Date/Time Discharge Orders None ACO Transitions of Care Introducing [employer] [address] Ana Lilia Krishna offers a voluntary care coordination program to provide high quality service and care to [facility] fee-for-service beneficiaries. Alex Almazan was designed to help you enhance your health and well-being through the following services: ? Transitions of Care  support for individuals who are transitioning from one care setting to another (example: Hospital to home). ? Chronic and Complex Care Coordination  support for individuals and caregivers of those with serious or chronic illnesses or with more than one chronic (ongoing) condition and those who take a number of different medications. If you meet specific medical criteria, a [facility] [address] may call you directly to coordinate your care with your primary care physician and your other care providers. For questions about the [facility] programs, please, contact your physicians office. For general questions or additional information about Accountable Care Organizations: 
Please visit www.medicare.gov/acos. html or call 1-800-MEDICARE (1-593.861.6505) TTY users should call 3-316.707.4202. [employer] Announcement We are excited to announce that we are making your provider's discharge notes available to you in [employer].   You will see these notes when they are completed and signed by the physician that discharged you from your recent hospital stay. If you have any questions or concerns about any information you see in [facility], please call the Health Information Department where you were seen or reach out to your Primary Care Provider for more information about your plan of care. Introducing \[facility] [place] [facility]\"" & HEALTH SERVICES! John Woods introduces [facility] patient portal. Now you can access parts of your medical record, email your doctor's office, and request medication refills online. 1. In your internet browser, go to https://[employer]. [employer] 2. Click on the First Time User? Click Here link in the Sign In box. You will see the New Member Sign Up page. 3. Enter your [facility] Access Code exactly as it appears below. You will not need to use this code after youve completed the sign-up process. If you do not sign up before the expiration date, you must request a new code. · [facility] Access Code: [email or web address] Expires: 11/25/2017  2:51 PM 
 
4. Enter the last four digits of your Social Security Number (xxxx) and Date of Birth (mm/dd/yyyy) as indicated and click Submit. You will be taken to the next sign-up page. 5. Create a [facility] ID. This will be your [facility] login ID and cannot be changed, so think of one that is secure and easy to remember. 6. Create a [facility] password. You can change your password at any time. 7. Enter your Password Reset Question and Answer. This can be used at a later time if you forget your password. 8. Enter your e-mail address. You will receive e-mail notification when new information is available in [address] 9. Click Sign Up. You can now view and download portions of your medical record. 10. Click the Download Summary menu link to download a portable copy of your medical information. If you have questions, please visit the Frequently Asked Questions section of the [facility] website. Remember, [facility] is NOT to be used for urgent needs. For medical emergencies, dial 911. Now available from your iPhone and Android! General Information Please provide this summary of care documentation to your next provider. Patient Signature:  ____________________________________________________________ Date:  ____________________________________________________________  
  
Fayrene Retort Provider Signature:  ____________________________________________________________ Date:  ____________________________________________________________

## 2017-08-24 LAB
ANION GAP SERPL CALC-SCNC: 8 MMOL/L (ref 5–15)
ATRIAL RATE: 105 BPM
BASOPHILS # BLD: 0 K/UL (ref 0–0.1)
BASOPHILS # BLD: 0 K/UL (ref 0–0.1)
BASOPHILS NFR BLD: 0 % (ref 0–1)
BASOPHILS NFR BLD: 0 % (ref 0–1)
BUN SERPL-MCNC: 17 MG/DL (ref 6–20)
BUN/CREAT SERPL: 15 (ref 12–20)
CALCIUM SERPL-MCNC: 7.4 MG/DL (ref 8.5–10.1)
CALCULATED P AXIS, ECG09: -12 DEGREES
CALCULATED R AXIS, ECG10: -70 DEGREES
CALCULATED T AXIS, ECG11: 76 DEGREES
CHLORIDE SERPL-SCNC: 108 MMOL/L (ref 97–108)
CO2 SERPL-SCNC: 22 MMOL/L (ref 21–32)
CREAT SERPL-MCNC: 1.11 MG/DL (ref 0.55–1.02)
DIAGNOSIS, 93000: NORMAL
DIFFERENTIAL METHOD BLD: ABNORMAL
EOSINOPHIL # BLD: 0 K/UL (ref 0–0.4)
EOSINOPHIL # BLD: 0 K/UL (ref 0–0.4)
EOSINOPHIL NFR BLD: 0 % (ref 0–7)
EOSINOPHIL NFR BLD: 0 % (ref 0–7)
ERYTHROCYTE [DISTWIDTH] IN BLOOD BY AUTOMATED COUNT: 13.6 % (ref 11.5–14.5)
ERYTHROCYTE [DISTWIDTH] IN BLOOD BY AUTOMATED COUNT: 14 % (ref 11.5–14.5)
GLUCOSE BLD STRIP.AUTO-MCNC: 133 MG/DL (ref 65–100)
GLUCOSE SERPL-MCNC: 138 MG/DL (ref 65–100)
HCT VFR BLD AUTO: 33.9 % (ref 35–47)
HCT VFR BLD AUTO: 36.5 % (ref 35–47)
HGB BLD-MCNC: 11.1 G/DL (ref 11.5–16)
HGB BLD-MCNC: 11.6 G/DL (ref 11.5–16)
LACTATE SERPL-SCNC: 1.6 MMOL/L (ref 0.4–2)
LYMPHOCYTES # BLD: 1.3 K/UL (ref 0.8–3.5)
LYMPHOCYTES # BLD: 1.4 K/UL (ref 0.8–3.5)
LYMPHOCYTES NFR BLD: 11 % (ref 12–49)
LYMPHOCYTES NFR BLD: 13 % (ref 12–49)
MCH RBC QN AUTO: 30 PG (ref 26–34)
MCH RBC QN AUTO: 30.5 PG (ref 26–34)
MCHC RBC AUTO-ENTMCNC: 31.8 G/DL (ref 30–36.5)
MCHC RBC AUTO-ENTMCNC: 32.7 G/DL (ref 30–36.5)
MCV RBC AUTO: 93.1 FL (ref 80–99)
MCV RBC AUTO: 94.3 FL (ref 80–99)
METAMYELOCYTES NFR BLD MANUAL: 1 %
MONOCYTES # BLD: 0.3 K/UL (ref 0–1)
MONOCYTES # BLD: 0.8 K/UL (ref 0–1)
MONOCYTES NFR BLD: 3 % (ref 5–13)
MONOCYTES NFR BLD: 7 % (ref 5–13)
NEUTS BAND NFR BLD MANUAL: 12 % (ref 0–6)
NEUTS SEG # BLD: 8 K/UL (ref 1.8–8)
NEUTS SEG # BLD: 9.8 K/UL (ref 1.8–8)
NEUTS SEG NFR BLD: 73 % (ref 32–75)
NEUTS SEG NFR BLD: 80 % (ref 32–75)
P-R INTERVAL, ECG05: 148 MS
PLATELET # BLD AUTO: 124 K/UL (ref 150–400)
PLATELET # BLD AUTO: 129 K/UL (ref 150–400)
POTASSIUM SERPL-SCNC: 3.6 MMOL/L (ref 3.5–5.1)
Q-T INTERVAL, ECG07: 334 MS
QRS DURATION, ECG06: 90 MS
QTC CALCULATION (BEZET), ECG08: 441 MS
RBC # BLD AUTO: 3.64 M/UL (ref 3.8–5.2)
RBC # BLD AUTO: 3.87 M/UL (ref 3.8–5.2)
RBC MORPH BLD: ABNORMAL
SERVICE CMNT-IMP: ABNORMAL
SODIUM SERPL-SCNC: 138 MMOL/L (ref 136–145)
TROPONIN I SERPL-MCNC: 0.04 NG/ML
TROPONIN I SERPL-MCNC: 0.04 NG/ML
TSH SERPL DL<=0.05 MIU/L-ACNC: 0.35 UIU/ML (ref 0.36–3.74)
VENTRICULAR RATE, ECG03: 105 BPM
WBC # BLD AUTO: 10.1 K/UL (ref 3.6–11)
WBC # BLD AUTO: 11.5 K/UL (ref 3.6–11)

## 2017-08-24 PROCEDURE — 83605 ASSAY OF LACTIC ACID: CPT | Performed by: EMERGENCY MEDICINE

## 2017-08-24 PROCEDURE — G8978 MOBILITY CURRENT STATUS: HCPCS

## 2017-08-24 PROCEDURE — 97165 OT EVAL LOW COMPLEX 30 MIN: CPT

## 2017-08-24 PROCEDURE — 99218 HC RM OBSERVATION: CPT

## 2017-08-24 PROCEDURE — 87186 SC STD MICRODIL/AGAR DIL: CPT | Performed by: FAMILY MEDICINE

## 2017-08-24 PROCEDURE — 74011000250 HC RX REV CODE- 250: Performed by: FAMILY MEDICINE

## 2017-08-24 PROCEDURE — 84443 ASSAY THYROID STIM HORMONE: CPT | Performed by: FAMILY MEDICINE

## 2017-08-24 PROCEDURE — 85025 COMPLETE CBC W/AUTO DIFF WBC: CPT | Performed by: FAMILY MEDICINE

## 2017-08-24 PROCEDURE — 74011250636 HC RX REV CODE- 250/636: Performed by: FAMILY MEDICINE

## 2017-08-24 PROCEDURE — G8979 MOBILITY GOAL STATUS: HCPCS

## 2017-08-24 PROCEDURE — 97116 GAIT TRAINING THERAPY: CPT

## 2017-08-24 PROCEDURE — 82962 GLUCOSE BLOOD TEST: CPT

## 2017-08-24 PROCEDURE — 97161 PT EVAL LOW COMPLEX 20 MIN: CPT

## 2017-08-24 PROCEDURE — 80048 BASIC METABOLIC PNL TOTAL CA: CPT | Performed by: FAMILY MEDICINE

## 2017-08-24 PROCEDURE — 65660000000 HC RM CCU STEPDOWN

## 2017-08-24 PROCEDURE — 74011250637 HC RX REV CODE- 250/637: Performed by: FAMILY MEDICINE

## 2017-08-24 PROCEDURE — 36415 COLL VENOUS BLD VENIPUNCTURE: CPT | Performed by: FAMILY MEDICINE

## 2017-08-24 PROCEDURE — 87070 CULTURE OTHR SPECIMN AEROBIC: CPT | Performed by: FAMILY MEDICINE

## 2017-08-24 PROCEDURE — 94640 AIRWAY INHALATION TREATMENT: CPT

## 2017-08-24 PROCEDURE — 84484 ASSAY OF TROPONIN QUANT: CPT | Performed by: FAMILY MEDICINE

## 2017-08-24 PROCEDURE — 77010033678 HC OXYGEN DAILY

## 2017-08-24 PROCEDURE — 87077 CULTURE AEROBIC IDENTIFY: CPT | Performed by: FAMILY MEDICINE

## 2017-08-24 PROCEDURE — 74011000258 HC RX REV CODE- 258: Performed by: FAMILY MEDICINE

## 2017-08-24 PROCEDURE — 97535 SELF CARE MNGMENT TRAINING: CPT

## 2017-08-24 RX ORDER — ROSUVASTATIN CALCIUM 10 MG/1
20 TABLET, COATED ORAL DAILY
Status: DISCONTINUED | OUTPATIENT
Start: 2017-08-24 | End: 2017-08-27 | Stop reason: HOSPADM

## 2017-08-24 RX ORDER — LOSARTAN POTASSIUM 50 MG/1
50 TABLET ORAL DAILY
Status: DISCONTINUED | OUTPATIENT
Start: 2017-08-24 | End: 2017-08-27 | Stop reason: HOSPADM

## 2017-08-24 RX ORDER — ACETAMINOPHEN 500 MG
500 TABLET ORAL
Status: DISCONTINUED | OUTPATIENT
Start: 2017-08-24 | End: 2017-08-27 | Stop reason: HOSPADM

## 2017-08-24 RX ORDER — LEVOTHYROXINE SODIUM 50 UG/1
50 TABLET ORAL
Status: DISCONTINUED | OUTPATIENT
Start: 2017-08-24 | End: 2017-08-27 | Stop reason: HOSPADM

## 2017-08-24 RX ORDER — ASPIRIN 81 MG/1
81 TABLET ORAL DAILY
Status: DISCONTINUED | OUTPATIENT
Start: 2017-08-24 | End: 2017-08-24

## 2017-08-24 RX ORDER — PANTOPRAZOLE SODIUM 40 MG/1
40 TABLET, DELAYED RELEASE ORAL
Status: DISCONTINUED | OUTPATIENT
Start: 2017-08-24 | End: 2017-08-27 | Stop reason: HOSPADM

## 2017-08-24 RX ADMIN — VANCOMYCIN HYDROCHLORIDE 750 MG: 10 INJECTION, POWDER, LYOPHILIZED, FOR SOLUTION INTRAVENOUS at 04:07

## 2017-08-24 RX ADMIN — ALBUTEROL SULFATE 1 DOSE: 2.5 SOLUTION RESPIRATORY (INHALATION) at 04:27

## 2017-08-24 RX ADMIN — PANTOPRAZOLE SODIUM 40 MG: 40 TABLET, DELAYED RELEASE ORAL at 06:26

## 2017-08-24 RX ADMIN — SODIUM CHLORIDE 100 ML/HR: 900 INJECTION, SOLUTION INTRAVENOUS at 06:27

## 2017-08-24 RX ADMIN — Medication 10 ML: at 06:00

## 2017-08-24 RX ADMIN — PANTOPRAZOLE SODIUM 40 MG: 40 TABLET, DELAYED RELEASE ORAL at 10:17

## 2017-08-24 RX ADMIN — ACETAMINOPHEN 500 MG: 500 TABLET ORAL at 00:58

## 2017-08-24 RX ADMIN — PANTOPRAZOLE SODIUM 40 MG: 40 TABLET, DELAYED RELEASE ORAL at 16:18

## 2017-08-24 RX ADMIN — LEVOTHYROXINE SODIUM 50 MCG: 0.05 TABLET ORAL at 06:26

## 2017-08-24 RX ADMIN — PIPERACILLIN SODIUM,TAZOBACTAM SODIUM 3.38 G: 3; .375 INJECTION, POWDER, FOR SOLUTION INTRAVENOUS at 02:58

## 2017-08-24 RX ADMIN — HEPARIN SODIUM 5000 UNITS: 5000 INJECTION, SOLUTION INTRAVENOUS; SUBCUTANEOUS at 00:58

## 2017-08-24 RX ADMIN — ROSUVASTATIN CALCIUM 20 MG: 10 TABLET, FILM COATED ORAL at 09:09

## 2017-08-24 RX ADMIN — PIPERACILLIN SODIUM,TAZOBACTAM SODIUM 3.38 G: 3; .375 INJECTION, POWDER, FOR SOLUTION INTRAVENOUS at 11:33

## 2017-08-24 RX ADMIN — PIPERACILLIN SODIUM,TAZOBACTAM SODIUM 3.38 G: 3; .375 INJECTION, POWDER, FOR SOLUTION INTRAVENOUS at 18:59

## 2017-08-24 RX ADMIN — LOSARTAN POTASSIUM 50 MG: 50 TABLET, FILM COATED ORAL at 09:09

## 2017-08-24 NOTE — PROGRESS NOTES
Problem: Mobility Impaired (Adult and Pediatric)  Goal: *Acute Goals and Plan of Care (Insert Text)  Physical Therapy Goals  Initiated 8/24/2017  1. Patient will move from supine to sit and sit to supine , scoot up and down and roll side to side in bed with independence within 7 day(s). 2. Patient will transfer from bed to chair and chair to bed with independence using the least restrictive device within 7 day(s). 3. Patient will perform sit to stand with independence within 7 day(s). 4. Patient will ambulate with independence for 200 feet with the least restrictive device within 7 day(s). 5. Patient will ascend/descend 4 stairs with handrail(s) with independence within 7 day(s). PHYSICAL THERAPY EVALUATION  Patient: Michael Delgado (26 y.o. female)  Date: 8/24/2017  Primary Diagnosis: Sepsis ([address]  CAP (community acquired pneumonia)                 ASSESSMENT :  Based on the objective data described below, the patient presents with generalized weakness and debility. Sit on the edge of bed independently, sit to stand supervision, ambulate without assistive device on the 4th floor hallway with supervision. Up and down stairs supervision. OOB to chair as tolerated, performed some active range of motion exercise on both LE all planes. Family in the room during the entire evaluation and agreed with all goals set for the patient. Notified nurse who agreed to monitor patient. Do not anticipate any home lucia needs. Patient cleared to go home per Physical Therapy perspective once medically stable to be discharge to home. Patient will benefit from skilled intervention to address the above impairments.   Patients rehabilitation potential is considered to be Excellent  Factors which may influence rehabilitation potential include:   [X]         None noted  [ ]         Mental ability/status  [ ]         Medical condition  [ ]         Home/family situation and support systems  [ ]         Safety awareness  [ ] Pain tolerance/management  [ ]         Other:        PLAN :  Recommendations and Planned Interventions:  [X]           Bed Mobility Training             [ ]    Neuromuscular Re-Education  [X]           Transfer Training                   [ ]    Orthotic/Prosthetic Training  [X]           Gait Training                         [ ]    Modalities  [X]           Therapeutic Exercises           [ ]    Edema Management/Control  [X]           Therapeutic Activities            [ ]    Patient and Family Training/Education  [ ]           Other (comment):     Frequency/Duration: Patient will be followed by physical therapy  5 times a week to address goals. Discharge Recommendations: None  Further Equipment Recommendations for Discharge: none       SUBJECTIVE:   Patient stated I feel alright now.       OBJECTIVE DATA SUMMARY:   HISTORY:    Past Medical History:   Diagnosis Date    Allergic rhinitis      Diabetes ([facility] [address]      Hypercholesterolemia      Hypertension      Thyroid disease       Past Surgical History:   Procedure Laterality Date    BREAST SURGERY PROCEDURE UNLISTED        HX CHOLECYSTECTOMY         Prior Level of Function/Home Situation: Independent community ambulator without assistive device. Personal factors and/or comorbidities impacting plan of care:            EXAMINATION/PRESENTATION/DECISION MAKING:   Critical Behavior:              Hearing: Auditory  Auditory Impairment: Hearing aid(s)  Hearing Aids/Status: At bedside     Range Of Motion:  AROM: Within functional limits           PROM: Within functional limits           Strength:    Strength:  Within functional limits                    Tone & Sensation:                                  Coordination:  Coordination: Within functional limits  Vision:      Functional Mobility:  Bed Mobility:  Rolling: Independent  Supine to Sit: Independent  Sit to Supine: Independent  Scooting: Independent  Transfers:  Sit to Stand: Supervision  Stand to Sit: Supervision  Stand Pivot Transfers: Supervision     Bed to Chair: Supervision              Balance:   Sitting: Intact  Standing: Intact; Without support  Ambulation/Gait Training:  Distance (ft): 100 Feet (ft)     Ambulation - Level of Assistance: Supervision     Gait Description (WDL): Within defined limits                                                                Stairs:  Number of Stairs Trained: 4  Stairs - Level of Assistance: Supervision              Rail Use: Both     Therapeutic Exercises:    Instructed patient to continue active range of motion exercise on both legs while up on chair or on bed. Functional Measure:  Tinetti test:      Sitting Balance: 1  Arises: 2  Attempts to Rise: 2  Immediate Standing Balance: 2  Standing Balance: 2  Nudged: 2  Eyes Closed: 1  Turn 360 Degrees - Continuous/Discontinuous: 1  Turn 360 Degrees - Steady/Unsteady: 1  Sitting Down: 2  Balance Score: 16  Indication of Gait: 1  R Step Length/Height: 1  L Step Length/Height: 1  R Foot Clearance: 1  L Foot Clearance: 1  Step Symmetry: 1  Step Continuity: 1  Path: 2  Trunk: 2  Walking Time: 1  Gait Score: 12  Total Score: 28         Tinetti Test and G-code impairment scale:  Percentage of Impairment CH     0%    CI     1-19% CJ     20-39% CK     40-59% CL     60-79% CM     80-99% CN      100%   Tinetti  Score 0-28 28 23-27 17-22 12-16 6-11 1-5 0          Tinetti Tool Score Risk of Falls  <19 = High Fall Risk  19-24 = Moderate Fall Risk  25-28 = Low Fall Risk  Tinetti ME. Performance-Oriented Assessment of Mobility Problems in Elderly Patients. [facility]; [id number].  (Scoring Description: PT Bulletin Feb. 10, 1993)     Older adults: Mimi Correa et al, 2009; n = 1000 [facility] elderly evaluated with ABC, DAVID, ADL, and IADL)  · Mean DAVID score for males aged 69-68 years = 26.21(3.40)  · Mean DAVID score for females age 69-68 years = 25.16(4.30)  · Mean DAVID score for males over 80 years = 23.29(6.02)  · Mean DAVID score for females over 80 years = 17.20(8.32)         G codes: In compliance with CMSs Claims Based Outcome Reporting, the following G-code set was chosen for this patient based on their primary functional limitation being treated: The outcome measure chosen to determine the severity of the functional limitation was the tinetti with a score of 28/28 which was correlated with the impairment scale. · Mobility - Walking and Moving Around:               - CURRENT STATUS:    CH - 0% impaired, limited or restricted               - GOAL STATUS:           CH - 0% impaired, limited or restricted               - D/C STATUS:                       CH - 0% impaired, limited or restricted      Physical Therapy Evaluation Charge Determination   History Examination Presentation Decision-Making   LOW Complexity : Zero comorbidities / personal factors that will impact the outcome / POC LOW Complexity : 1-2 Standardized tests and measures addressing body structure, function, activity limitation and / or participation in recreation  LOW Complexity : Stable, uncomplicated  Other outcome measures tinetti  LOW       Based on the above components, the patient evaluation is determined to be of the following complexity level: LOW      Pain:  Pain Scale 1: Numeric (0 - 10)  Pain Intensity 1: 0              Activity Tolerance:   Good. Please refer to the flowsheet for vital signs taken during this treatment. After treatment:   [X]         Patient left in no apparent distress sitting up in chair  [ ]         Patient left in no apparent distress in bed  [X]         Call bell left within reach  [X]         Nursing notified  [X]         Caregiver present  [ ]         Bed alarm activated      COMMUNICATION/EDUCATION:   The patients plan of care was discussed with: Occupational Therapist, Registered Nurse and patient. [X]         Fall prevention education was provided and the patient/caregiver indicated understanding.   [X] Patient/family have participated as able in goal setting and plan of care. [X]         Patient/family agree to work toward stated goals and plan of care. [ ]         Patient understands intent and goals of therapy, but is neutral about his/her participation. [ ]         Patient is unable to participate in goal setting and plan of care. Thank you for this referral.  Hazel Munoz, PT,WCC.    Time Calculation: 24 mins

## 2017-08-24 NOTE — ROUTINE PROCESS
Bedside and Verbal shift change report given to Nga Ornelas RN (oncoming nurse) by Jamie Patel RN (offgoing nurse). Report included the following information SBAR, Kardex, ED Summary, MAR and Cardiac Rhythm NSR/ Sinus Arrythmia.

## 2017-08-24 NOTE — PROGRESS NOTES
Problem: Self Care Deficits Care Plan (Adult)  Goal: *Acute Goals and Plan of Care (Insert Text)  Occupational Therapy Goals  Initiated 8/24/2017  1. Patient will perform grooming standing at sink with independence within 7 day(s). 2. Patient will perform lower body dressing with modified independence within 7 day(s). 3. Patient will perform toilet transfers with modified independence within 7 day(s). 4. Patient will perform all aspects of toileting with modified independence within 7 day(s). 5. Patient will participate in upper extremity therapeutic exercise/activities with independence for 10 minutes within 7 day(s). 6. Patient will utilize energy conservation techniques during functional activities with minimal verbal cues within 7 day(s). OCCUPATIONAL THERAPY EVALUATION  Patient: Addison Mccall (66 y.o. female)  Date: 8/24/2017  Primary Diagnosis: Sepsis ([address]  CAP (community acquired pneumonia)        Precautions:  DNR      ASSESSMENT :  Based on the objective data described below, the patient presents with decreased endurance and strength following admission for sepsis and community acquired pneumonia. Pt was received supine in bed, family at bedside. Pt previously I with ADLs, functional mobility and driving, living with her son. She performed bed mobility with independence, and demonstrated good sitting balance. Pt's UE ROM and strength functional, but R shoulder flexion is limited due to previous shoulder injury. Pt performed LB dressing task with min A, required min A to reach R foot to don sock. Pt stood and ambulated to bathroom with supervision, and performed toilet transfer with supervision. Pt reported she was feeling \"winded\" after ambulating in hallway, returned to room and sat in recliner. Pt on room air throughout session with SpO2 measuring 93-97%.  As pt is performing below her independent baseline, she will benefit from continued OT to increase independence and provide education on energy conservation. Anticipate no further OT needs upon discharge from acute setting. Patient will benefit from skilled intervention to address the above impairments. Patients rehabilitation potential is considered to be Good  Factors which may influence rehabilitation potential include:   [X]             None noted  [ ]             Mental ability/status  [ ]             Medical condition  [ ]             Home/family situation and support systems  [ ]             Safety awareness  [ ]             Pain tolerance/management  [ ]             Other:        PLAN :  Recommendations and Planned Interventions:  [X]               Self Care Training                  [X]        Therapeutic Activities  [X]               Functional Mobility Training    [ ]        Cognitive Retraining  [X]               Therapeutic Exercises           [X]        Endurance Activities  [X]               Balance Training                   [ ]        Neuromuscular Re-Education  [ ]               Visual/Perceptual Training     [X]   Home Safety Training  [X]               Patient Education                 [X]        Family Training/Education  [ ]               Other (comment):     Frequency/Duration: Patient will be followed by occupational therapy 3 times a week to address goals. Discharge Recommendations: None  Further Equipment Recommendations for Discharge: none       SUBJECTIVE:   Patient stated I'm feeling a little winded now.       OBJECTIVE DATA SUMMARY:   HISTORY:   Past Medical History:   Diagnosis Date    Allergic rhinitis      Diabetes ([facility] [address]      Hypercholesterolemia      Hypertension      Thyroid disease       Past Surgical History:   Procedure Laterality Date    BREAST SURGERY PROCEDURE UNLISTED        HX CHOLECYSTECTOMY            Prior Level of Function/Home Situation:  Pt previously I with ADLs, functional mobility and driving, living with her son.       Home Situation  Home Environment: Private residence  # Steps to Enter: 5  Rails to [facility]: Yes  One/Two Story Residence: Two story, live on 1st floor  Living Alone: No (lives with son)  Support Systems: Family member(s)  Patient Expects to be Discharged to[de-identified] Private residence  Tub or Shower Type: Tub/Shower combination  [ ]  Right hand dominant   [X]  Left hand dominant     EXAMINATION OF PERFORMANCE DEFICITS:  Cognitive/Behavioral Status:  Neurologic State: Alert  Orientation Level: Oriented X4  Cognition: Follows commands  Perception: Appears intact  Perseveration: No perseveration noted  Safety/Judgement: Fall prevention;Home safety; Awareness of environment     Hearing: Auditory  Auditory Impairment: Hard of hearing, bilateral, Hearing aid(s)  Hearing Aids/Status: At bedside     Vision/Perceptual:        Acuity: Able to read clock/calendar on wall without difficulty; Able to read employee name badge without difficulty    Corrective Lenses: Glasses     Range of Motion:  AROM: Generally decreased, functional  PROM: Within functional limits     Strength:  Strength: Generally decreased, functional        Coordination:  Coordination: Within functional limits  Fine Motor Skills-Upper: Left Intact; Right Intact    Gross Motor Skills-Upper: Left Intact; Right Impaired (Pt with previous shoulder injury limited R shoulder flex)     Tone & Sensation:  Tone: Normal  Sensation: Intact        Balance:  Sitting: Intact; Without support  Standing: Intact; Without support     Functional Mobility and Transfers for ADLs:  Bed Mobility:  Rolling: Independent  Supine to Sit: Independent  Sit to Supine: Independent  Scooting: Independent     Transfers:  Sit to Stand: Supervision  Stand to Sit: Supervision  Bed to Chair: Supervision  Toilet Transfer : Supervision     ADL Assessment:  Feeding: Independent     Oral Facial Hygiene/Grooming: Independent     Bathing: Supervision     Upper Body Dressing: Independent     Lower Body Dressing: Minimum assistance     Toileting: Supervision        ADL Intervention and task modifications:   Pt doffed/donned socks with min A, required A to don R sock as pt was unable to reach R foot (she reports she usually is able to don socks without A). Lower Body Dressing Assistance  Socks: Minimum assistance           Cognitive Retraining  Safety/Judgement: Fall prevention;Home safety; Awareness of environment     Functional Measure:  Barthel Index:      Bathin  Bladder: 10  Bowels: 10  Groomin  Dressin  Feeding: 10  Mobility: 10  Stairs: 5  Toilet Use: 5  Transfer (Bed to Chair and Back): 10  Total: 70         Barthel and G-code impairment scale:  Percentage of impairment CH  0% CI  1-19% CJ  20-39% CK  40-59% CL  60-79% CM  80-99% CN  100%   Barthel Score 0-100 100 99-80 79-60 59-40 20-39 1-19    0   Barthel Score 0-20 20 17-19 13-16 9-12 5-8 1-4 0      The Barthel ADL Index: Guidelines  1. The index should be used as a record of what a patient does, not as a record of what a patient could do. 2. The main aim is to establish degree of independence from any help, physical or verbal, however minor and for whatever reason. 3. The need for supervision renders the patient not independent. 4. A patient's performance should be established using the best available evidence. Asking the patient, friends/relatives and nurses are the usual sources, but direct observation and common sense are also important. However direct testing is not needed. 5. Usually the patient's performance over the preceding 24-48 hours is important, but occasionally longer periods will be relevant. 6. Middle categories imply that the patient supplies over 50 per cent of the effort. 7. Use of aids to be independent is allowed. Ninfa Meyers., Barthel, D.W. (6783). Functional evaluation: the Barthel Index. [facility] (14)2. Rose Press becca NANCY Keene, Eliceo Nuñez., Jermaine Carrasco.Lucero, [address] ().  Measuring the change indisability after inpatient rehabilitation; comparison of the responsiveness of the Barthel Index and Functional Major Measure. Journal of Neurology, Neurosurgery, and Psychiatry, 66(4), 989-843. JAYY Cool, MATEO Kowalski, & Amber Gilbert M.A. (2004.) Assessment of post-stroke quality of life in cost-effectiveness studies: The usefulness of the Barthel Index and the EuroQoL-5D. Quality of Life Research, 13, 351-54         G codes: In compliance with CMSs Claims Based Outcome Reporting, the following G-code set was chosen for this patient based on their primary functional limitation being treated: The outcome measure chosen to determine the severity of the functional limitation was the Barthel Index with a score of 70/100 which was correlated with the impairment scale. · Self Care:               - CURRENT STATUS:    CJ - 20%-39% impaired, limited or restricted               - GOAL STATUS:           CH - 0% impaired, limited or restricted               - D/C STATUS:                       ---------------To be determined---------------      Occupational Therapy Evaluation Charge Determination   History Examination Decision-Making   LOW Complexity : Brief history review  LOW Complexity : 1-3 performance deficits relating to physical, cognitive , or psychosocial skils that result in activity limitations and / or participation restrictions  LOW Complexity : No comorbidities that affect functional and no verbal or physical assistance needed to complete eval tasks       Based on the above components, the patient evaluation is determined to be of the following complexity level: LOW   Pain:  Pain Scale 1: Numeric (0 - 10)  Pain Intensity 1: 0     Activity Tolerance:   Good  Please refer to the flowsheet for vital signs taken during this treatment.   After treatment:   [X] Patient left in no apparent distress sitting up in chair  [ ] Patient left in no apparent distress in bed  [X] Call bell left within reach  [X] Nursing notified  [X] Caregiver present- family  [ ] Bed alarm activated      COMMUNICATION/EDUCATION:   The patients plan of care was discussed with: Physical Therapist and Registered Nurse.  [X] Home safety education was provided and the patient/caregiver indicated understanding. [X] Patient/family have participated as able in goal setting and plan of care. [X] Patient/family agree to work toward stated goals and plan of care. [ ] Patient understands intent and goals of therapy, but is neutral about his/her participation. [ ] Patient is unable to participate in goal setting and plan of care. This patients plan of care is appropriate for delegation to \[facility] [place] [facility]\"".      Thank you for this referral.  JACKELIN Tarango  Time Calculation: 20 mins

## 2017-08-24 NOTE — PROGRESS NOTES
Primary Nurse Juanis Quinones RN and Juany Burgess RN performed a dual skin assessment on this patient small abrasion noted on R melendez  Sadi score is 22

## 2017-08-24 NOTE — ROUTINE PROCESS
TRANSFER - OUT REPORT:    Verbal report given to Pranav Avendano RN (name) on Cruz Vicente  being transferred to [facility] [id number] (unit) for routine progression of care       Report consisted of patients Situation, Background, Assessment and   Recommendations(SBAR). Information from the following report(s) SBAR, ED Summary, Intake/Output, MAR, Recent Results and Cardiac Rhythm ST was reviewed with the receiving nurse. Lines:   Peripheral IV 08/23/17 Right Antecubital (Active)   Site Assessment Clean, dry, & intact 8/23/2017  9:12 PM   Phlebitis Assessment 0 8/23/2017  9:12 PM   Infiltration Assessment 0 8/23/2017  9:12 PM   Dressing Status Clean, dry, & intact 8/23/2017  9:12 PM   Dressing Type Transparent 8/23/2017  9:12 PM   Hub Color/Line Status Pink 8/23/2017  9:12 PM       Peripheral IV 08/23/17 Left Hand (Active)   Site Assessment Clean, dry, & intact 8/23/2017  9:15 PM   Phlebitis Assessment 0 8/23/2017  9:15 PM   Infiltration Assessment 0 8/23/2017  9:15 PM   Dressing Status Clean, dry, & intact 8/23/2017  9:15 PM   Dressing Type Tape;Transparent 8/23/2017  9:15 PM   Hub Color/Line Status Pink;Flushed;Patent 8/23/2017  9:15 PM   Action Taken Blood drawn 8/23/2017  9:15 PM        Opportunity for questions and clarification was provided.       Patient transported with:   Registered Nurse

## 2017-08-24 NOTE — ED PROVIDER NOTES
HPI Comments: 80 y.o. female with past medical history significant for hypercholesterolemia, thyroid disease, DM, HTN, and allergic rhinitis who presents accompanied by daughters with chief complaint of cough. The pt c/o a cough that has been ongoing and gradually worsening for about a week. The pt reports that her cough produces clear mucus. The pt also notes associated weakness, decreased appetite, SOB, and wheezing. The pt's daughter reports that the pt called them so they could take her to Urgent Care where they were instructed to present to the ED for further evaluation. The pt's daughters indicate that the pt similarly had wheezing when she was diagnosed with pneumonia 6 months ago. The daughter say that the pt had outpatient treatment at the time and was given an albuterol inhaler. The pt notes occasional diarrhea and admits that she has not been drinking a lot of fluids. The pt denies nausea, vomiting, and dysuria. There are no other acute medical concerns at this time. Social hx: nonsmoker  PCP: Sheba Rizo MD    Note written by Lori Wilkerson, as dictated by Yanna Oliveira MD 8:55 PM     The history is provided by the patient and a relative (daughters). Past Medical History:   Diagnosis Date    Allergic rhinitis     Diabetes ([address]     Hypercholesterolemia     Hypertension     Thyroid disease        Past Surgical History:   Procedure Laterality Date    BREAST SURGERY PROCEDURE UNLISTED      HX CHOLECYSTECTOMY           Family History:   Problem Relation Age of Onset    Heart Disease Mother     Heart Disease Father     Diabetes Paternal Grandfather        Social History     Social History    Marital status:      Spouse name: N/A    Number of children: N/A    Years of education: N/A     Occupational History    Not on file.      Social History Main Topics    Smoking status: Never Smoker    Smokeless tobacco: Never Used    Alcohol use No    Drug use: No    Sexual activity: Not Currently     Other Topics Concern    Not on file     Social History Narrative         ALLERGIES: Advil pm  [ibuprofen-diphenhydramine cit] and Codeine    Review of Systems   Constitutional: Positive for appetite change (decrease). Respiratory: Positive for cough (clear mucus), shortness of breath and wheezing. Gastrointestinal: Positive for diarrhea (occasional). Negative for nausea and vomiting. Genitourinary: Negative for dysuria. Neurological: Positive for weakness. All other systems reviewed and are negative. Vitals:    08/23/17 2032 08/23/17 2037   BP: 120/59    Pulse: (!) 108    Resp: 20    Temp: 100.3 °F (37.9 °C)    SpO2: 92% 95%   Weight: 55.3 kg (122 lb)    Height: 5' 3\" (1.6 m)             Physical Exam   Constitutional: She is oriented to person, place, and time. No distress. Nasal cannula in place. Mildly ill appearing. HENT:   Head: Normocephalic and atraumatic. Eyes: Conjunctivae are normal. No scleral icterus. Neck: Neck supple. No tracheal deviation present. Cardiovascular: Normal rate, regular rhythm, normal heart sounds and intact distal pulses. Exam reveals no gallop and no friction rub. No murmur heard. Pulmonary/Chest: Effort normal. She has no rales. She has diffuse coarse breath sounds with scattered wheezes. Abdominal: Soft. She exhibits no distension. There is no tenderness. There is no rebound and no guarding. Musculoskeletal: She exhibits no edema. Neurological: She is alert and oriented to person, place, and time. Skin: Skin is warm and dry. No rash noted. Psychiatric: She has a normal mood and affect. Nursing note and vitals reviewed. Note written by Lori Garcia, as dictated by Carly Cleary MD 8:55 PM     [facility]  ED Course       Procedures    ED EKG interpretation:  Rhythm: sinus tachycardia; and regular . Rate (approx.): 105 bpm; Axis: left axis deviation; ST/T wave: non-specific changes; frequent PACs.  Note written by Lori Jacinto, as dictated by Sandip Adams MD 8:53 PM               CONSULT NOTE:  10:16 PM Sandip Adams MD spoke with the [facility] Resident. Discussed available diagnostic tests and clinical findings. He is in agreement with care plans as outlined. The resident will see and admit pt for further evaluation of treatment. Total critical care time spend exclusive of procedures:  35 min. Sandip Adams MD    A/P: sepsis with likely pneumonia - cough for a week with weakness, poor appetite,and dyspnea; course bilateral breath sounds; tachycardia/tachypnea/leukocytosis with suspected source qualifies pt as having sepsis; BP stable; lactate normal; borderline sat's; fluids/AB's in ED; admit.   Sandip Adams MD

## 2017-08-24 NOTE — H&P
[facility]   Admission H&P    Date of admission: 8/23/2017    Patient name: Leander Eason  MRN: [id number]  YOB: 1927  Age: 80 y.o. Primary care provider:  Radu Yu MD     Source of Information: patient, daughters, medical records    Chief complaint:  Cough    History of Present Illness  Leander Eason is a 80 y.o. female with a history of DM2, HTN, HLD, hypothyroidism, osteoporosis, and hx of falls. Patient presents for cough that started 10 days ago and has been worsening for the last week. Cough is productive but with clear sputum. States today she had chills but didn't take her temperature. Has had shortness of breath and complains of wheezing for the past 2-3 days. Not currently on oxygen at home. Daughter states breathing has improved since she received breathing treatment and oxygen in the ED. Patient told her daughters today she wasn't feeling well and they took her to Urgent Care and then was sent to ED. She was previously treated for pneumonia as an outpatient in 02/17. Daughters state her appetite has been declining for the past year and she has lost 20lbs during this time. States she has poor PO liquid intake but this is not new. In the ER, vital signs were remarkable for tachycardia 108, RR 20, hypoxic 92%. Labs were remarkable for WBC 13.6, mild hyponatremia 135, Cr 1.24, GFR 49. CXR was unremarkable. CT showed mild patchy bilateral lower lobe airspace opacities suggestive of pneumonia. Pt was treated with duonebs, 1L bolus, IV ceftriaxone 1g, and IV 500mg Azithromycin. Home Medications   Prior to Admission medications    Medication Sig Start Date End Date Taking?  Authorizing Provider   rosuvastatin (CRESTOR) 20 mg tablet TAKE 1 TABLET BY MOUTH EVERY DAY 8/21/17  Yes Radu Yu MD   omeprazole (PRILOSEC) 20 mg capsule TAKE 2 CAPSULES BY MOUTH EVERY DAY 7/17/17  Yes Radu Yu MD   levothyroxine (SYNTHROID) 50 mcg tablet TAKE 1 TABLET BY MOUTH EVERY DAY BEFORE BREAKFAST 6/2/17  Yes Giorgi Guzmán MD   losartan (COZAAR) 50 mg tablet TAKE 1 TABLET BY MOUTH EVERY DAY 5/16/17  Yes Giorgi Guzmán MD   glucose blood VI test strips (ONETOUCH ULTRA TEST) strip TEST ONCE A DAY/Dx Code: E11.9 4/20/17  Yes Giorgi Guzmán MD   Lancets misc TEST ONCE A DAY/Dx Code: E11.9 4/20/17  Yes Giorgi Guzmán MD   LACTOSE-REDUCED FOOD (PROTEIN NUTRITIONAL SHAKE PO) Take 1 Bottle by mouth daily. Yes Historical Provider   cetirizine (ZYRTEC) 10 mg tablet Take 10 mg by mouth daily. Yes Historical Provider   Blood-Glucose Meter monitoring kit ONE TOUCH GLUCOMETER. 5/4/15  Yes Giorgi Guzmán MD   alendronate (FOSAMAX) 70 mg tablet TAKE 1 TABLET BY MOUTH EVERY SUNDAY FOR 30 DAYS 10/2/13  Yes Giorgi Guzmán MD   cholecalciferol, vitamin d3, (VITAMIN D) 1,000 unit tablet Take 1,000 Units by mouth daily. Yes Historical Provider   calcium carbonate (CALCIUM 500) 500 mg (1,250 mg) tablet Take 1,250 mg by mouth daily. Yes Historical Provider   aspirin delayed-release 81 mg tablet take 81 mg by mouth daily. Yes Historical Provider   Isac Current 90 mcg/actuation aepb USE 2 INHALATIONS BY MOUTH EVERY 4 TO 6 HOURS AS NEEDED 2/27/17   Historical Provider   omeprazole (PRILOSEC) 20 mg capsule Take 1 Cap by mouth daily for 30 days. 1/19/17 2/18/17  Giorgi Guzmán MD       Allergies   Allergies   Allergen Reactions    Advil Pm  [Ibuprofen-Diphenhydramine Cit]      Other reaction(s):  Adverse reaction to substance , Hives    Codeine Other (comments)     Causes stomach pains       Past Medical History:   Diagnosis Date    Allergic rhinitis     Diabetes ([address]     Hypercholesterolemia     Hypertension     Thyroid disease        Past Surgical History:   Procedure Laterality Date    BREAST SURGERY PROCEDURE UNLISTED      HX CHOLECYSTECTOMY         Family History   Problem Relation Age of Onset    Heart Disease Mother     Heart Disease Father     Diabetes Paternal Grandfather        Social History   Patient resides with son. Ambulates independently. Alcohol history - none  Smoking history - none  Drug history - none    History   Smoking Status    Never Smoker   Smokeless Tobacco    Never Used     Code status    Full code   X  DNR/DNI     Partial    Code status discussed with the patient/caregivers. DaughterJosie - [phone]  Daughter, Cornelius Martínez - [phone]    Review of Systems  Gen: Denies fever, sick contacts, endorses chills  Heme: Denies easy bleeding, bruising  Endocrine: Denies significant weight loss, gain  Cardio: Denies chest pain, palpitations  Lungs: Denies shortness of breath, cough  GI: Denies abdominal pain, melena, n/v, d/c  : Denies dysuria, hematuria  MSK: Denies cramping, weakness  Neuro: Denies vision changes, numbness  Psych: Denies depressive sx, anxiety or trouble sleeping    Physical Exam  Visit Vitals    BP (!) 122/92    Pulse (!) 102    Temp 100.3 °F (37.9 °C)    Resp 16    Ht 5' 3\" (1.6 m)    Wt 122 lb (55.3 kg)    SpO2 97%    BMI 21.61 kg/m2        General: No acute distress. Alert. Cooperative. Head: Normocephalic. Atraumatic. Eyes:  Conjunctiva pink. Sclera white. Nose:  Septum midline. Mucosa pink. No drainage. Throat: Mucosa pink. Moist mucous membranes. Neck: Supple. Normal ROM. No stiffness. Respiratory: Diffuse wheezing, rales in lower lobes. Cardiovascular: RRR. Normal S1,S2. No m/r/g. Pulses 2+ throughout. GI: + bowel sounds. Nontender. No rebound tenderness or guarding. Nondistended. Extremities: No edema. No palpable cord. No tenderness. Musculoskeletal: Full ROM in all extremities. Neuro: CN II-XII grossly intact. Sensation grossly intact. Skin: Clear. No rashes. No ulcers.         Laboratory Data  Recent Results (from the past 24 hour(s))   CBC WITH AUTOMATED DIFF    Collection Time: 08/23/17  9:13 PM   Result Value Ref Range    WBC 13.6 (H) 3.6 - 11.0 K/uL    RBC 4.34 3.80 - 5.20 M/uL    HGB 13.4 11.5 - 16.0 g/dL    HCT 40.7 35.0 - 47.0 %    MCV 93.8 80.0 - 99.0 FL    MCH 30.9 26.0 - 34.0 PG    MCHC 32.9 30.0 - 36.5 g/dL    RDW 13.6 11.5 - 14.5 %    PLATELET 948 427 - 542 K/uL    NEUTROPHILS 81 (H) 32 - 75 %    LYMPHOCYTES 11 (L) 12 - 49 %    MONOCYTES 8 5 - 13 %    EOSINOPHILS 0 0 - 7 %    BASOPHILS 0 0 - 1 %    ABS. NEUTROPHILS 11.1 (H) 1.8 - 8.0 K/UL    ABS. LYMPHOCYTES 1.4 0.8 - 3.5 K/UL    ABS. MONOCYTES 1.1 (H) 0.0 - 1.0 K/UL    ABS. EOSINOPHILS 0.0 0.0 - 0.4 K/UL    ABS. BASOPHILS 0.0 0.0 - 0.1 K/UL   METABOLIC PANEL, COMPREHENSIVE    Collection Time: 08/23/17  9:13 PM   Result Value Ref Range    Sodium 135 (L) 136 - 145 mmol/L    Potassium 3.8 3.5 - 5.1 mmol/L    Chloride 102 97 - 108 mmol/L    CO2 26 21 - 32 mmol/L    Anion gap 7 5 - 15 mmol/L    Glucose 149 (H) 65 - 100 mg/dL    BUN 18 6 - 20 MG/DL    Creatinine 1.24 (H) 0.55 - 1.02 MG/DL    BUN/Creatinine ratio 15 12 - 20      GFR est AA 49 (L) >60 ml/min/1.73m2    GFR est non-AA 41 (L) >60 ml/min/1.73m2    Calcium 8.5 8.5 - 10.1 MG/DL    Bilirubin, total 0.3 0.2 - 1.0 MG/DL    ALT (SGPT) 25 12 - 78 U/L    AST (SGOT) 42 (H) 15 - 37 U/L    Alk.  phosphatase 57 45 - 117 U/L    Protein, total 6.5 6.4 - 8.2 g/dL    Albumin 3.1 (L) 3.5 - 5.0 g/dL    Globulin 3.4 2.0 - 4.0 g/dL    A-G Ratio 0.9 (L) 1.1 - 2.2     TROPONIN I    Collection Time: 08/23/17  9:13 PM   Result Value Ref Range    Troponin-I, Qt. <0.04 <0.05 ng/mL   LACTIC ACID    Collection Time: 08/23/17  9:13 PM   Result Value Ref Range    Lactic acid 1.4 0.4 - 2.0 MMOL/L   URINALYSIS W/ REFLEX CULTURE    Collection Time: 08/23/17  9:28 PM   Result Value Ref Range    Color DARK YELLOW      Appearance CLEAR CLEAR      Specific gravity 1.026 1.003 - 1.030      pH (UA) 5.0 5.0 - 8.0      Protein 30 (A) NEG mg/dL    Glucose NEGATIVE  NEG mg/dL    Ketone 15 (A) NEG mg/dL    Bilirubin NEGATIVE  NEG      Blood TRACE (A) NEG      Urobilinogen 0.2 0.2 - 1.0 EU/dL    Nitrites NEGATIVE  NEG      Leukocyte Esterase TRACE (A) NEG      WBC 5-10 0 - 4 /hpf    RBC 10-20 0 - 5 /hpf    Epithelial cells FEW FEW /lpf    Bacteria NEGATIVE  NEG /hpf    UA:UC IF INDICATED URINE CULTURE ORDERED (A) CNI      Hyaline cast 0-2 0 - 5 /lpf     Imaging  CXR - no acute infiltrate  CT Chest - mild patchy B/L lower lobe airspace opacities suggestive of pneumonia    EKG:  Official reading pending  Preliminary: Sinus Tachycardia and regular, 105 bpm    Assessment and Plan   Dimitri Cartagena is a 80 y.o. female DM2, HTN, HLD, hypothyroidism, osteoporosis, and hx of falls. Admitted to tele for sepsis secondary to CAP.      Sepsis secondary to CAP- SIRS 3/4 in ED with CT showing infiltrates suspicious for pneumonia and clinical indications of CAP, received IV Rocephin and Azithromycin x1 in ED, received 1.7L bolus in ED   - MIVF 100mL/hr  - Vanc to cover gram positives, Zosyn to cover gram negatives and anaerobes  - Tylenol PRN for fever  - Duonebs PRN, consider Xopenex if patient becomes tachycardic   - Blood, urine and sputum cultures pending   - Troponin q6h to rule out ACS  - Daily CBC, LA q4 hours per sepsis bundle until <2    Generalized Weakness with Anorexia and Weight Loss - given concern for malignancy ordered a CT chest which showed multiple benign 4mm nodules throughout lungs  - FOBT as collected, patient states she has not ever had a colonoscopy  - PT/ OT consult  - Nutrition consult   - Strict I/O    Type II Diabetes - diet controlled, A1c 6 (9/27/16)  - Glucose checks q6h  - Regular diet as patient has not been eating and is very frail  - Nutrition consult     HTN - stable  - Continue Losartan     HLD - , , HDL 41, LDL 47 (10/7/16)  - Continue Rosuvastatin     Hypothyroidism - 1.07 (9/27/16)  - Continue Synthroid    Osteoporosis   - Hold Calcium    FEN/GI - Regular diet,   Activity - Ambulate with assistance  DVT prophylaxis - Heparin  GI prophylaxis - Omeprazole  Disposition - Admit to tele, plan to D/C home.     CODE STATUS:  DNR       Patient discussed with Dr. Mandi Gibson MD (PGY2) and to be discussed Attending, MD Petrona Cummings [address] Problems  Date Reviewed: 4/20/2017    None

## 2017-08-24 NOTE — PROGRESS NOTES
8/24/2017   CARE MANAGEMENT NOTE:  CM is following pt for initial discharge planning. EMR reviewed. CM met with pt and family at bedside to obtain hx for this needs assessment. Reportedly, pt resides in a one story home with her son who stays in the basement. PTA, pt was ambulatory, indepn with ADLs, and drives. She uses [facility] pharmacy at [facility]. Pt does not have home healthcare currently. DME in the home includes a BSC. PCP is Dr. Lakshmi Trejo. She has an appt scheduled for Sept. 29.  CM notified Nurse Ifrah Aldana of pt's admission. Plan is to return home when medically stable.   Brenda

## 2017-08-24 NOTE — WOUND CARE
Ostomy nurse follow up  In chair, appliance intact, small amount dark drainage. Will follow up on 8-25 to assess and continue ostomy education with patient.    Georgetta Habermann RN [employer]

## 2017-08-24 NOTE — PROGRESS NOTES
Bedside shift change report given to Sharri Cm RN (oncoming nurse) by Ricardo Ward RN (offgoing nurse). Report included the following information SBAR, Kardex, Intake/Output, MAR and Recent Results.

## 2017-08-24 NOTE — ED NOTES
Bedside and Verbal shift change report given to [id number] [facility] (oncoming nurse) by Mikayla Sofia (offgoing nurse). Report included the following information SBAR, ED Summary, MAR, Recent Results and Cardiac Rhythm NSR.

## 2017-08-24 NOTE — ED TRIAGE NOTES
Pt from urgent care and sent to ED to rule out pneumonia. Pulse ox 92% on RA. Jeannine Jackson Had been 88 at urgent care. Placed on 2L NC. Pt with dry cough. Denies chest pain. C/o back pain and headache.

## 2017-08-24 NOTE — PROGRESS NOTES
[facility]   Senior Resident Admission Note    CC: Cough and shortness of breath    HPI:  Sharon Lawson is a 80 y.o. female with history of HTN, diet control DM type II, hypothyroidism, HLD,GERDwho presents to the ER via personal car complaining of chronic productive cough with clear sputum for the past ten days. However over the past 3 days pt reports becoming increasingly short of breath, worsening of cough . Pt reports having similar episode 6 months ago,pt was treated with po antibiotics outpatient without new oxygen requirement. Pt is not on home oxygen. Denies history of smoking however, was exposed to heavy second hand smoke > 30 years. Addtionally, pt reports poor po intake for the past 4 months with a 20lb unintentional weight loss. Pt denies lower extremity swelling, Chart reviewed. Patient seen, examined, and discussed with Dr. Dimitri Yeboah (PGY-1). See 's note for more details. A/P:   Ms. Ca Contreras is a 81 yo female with history of HTN,  Diet controlled type II DM, HLD, GERD who presents with cough and shortness of breath for the past 10 days and is now admitted for Sepsis 2/2 CAP    Sepsis: infectious sources likely CAP  3/4 SIRS criteria met (WBC 13.6,  , RR 25); Lactic Acid 1.6  - Admit to remote tele  - Vital signs per unit protocol  - IV Fluids: Received ~1.7 L in ER. Continue MIVF to maintain a MAP >65,  - IV Antibiotics: Broad spectrum Vancomycin, Zosyn. - Obtain blood cultures (prior to initiation of Abx), UA w/ Ucx, suputum cultures   - CXR reveals:no acute infiltrates   - Chest CT scan, due to unintentional weight loss, new oxygen requirement, concern for malignancy given age  - Wheezing :DUONebs prn , if tachycardic Xopenex prn   - Per sepsis bundle:  Lactic Acid q4  X 1        Please refer to Dr. Mikey Sims note for full details. I agree with remaining assessment and plan as documented in Dr. Mikey Sims note.       Pt discussed with Dr. Hilario Chakraborty (Attending physician)    Dori Alfred MD  Family Medicine Resident

## 2017-08-24 NOTE — PROGRESS NOTES
[facility] Dosing Services: Antimicrobial Stewardship Progress Note    Consult for antibiotic dosing of Vancomycin by BOOGIE Crawford  Pharmacist reviewed antibiotic appropriateness for 5 year old , female  for indication of CAP  Day of Therapy 1    Plan:  Vancomycin therapy:  Start Vancomycin therapy, with loading dose of 750 (mg) at 0330 8/24/17. Follow with maintenance dose of : by random level. Dose calculated to approximate a therapeutic trough of 15-20 mcg/mL. Last trough level / Plan for level:   Pharmacy to follow daily and will make changes to dose and/or frequency based on clinical status. Non-Kinetic Antimicrobial Dosing:   Current Regimen:    Recommendation:   Other Antimicrobial  (not dosed by pharmacist)   Zosyn   Cultures        Serum Creatinine     Lab Results   Component Value Date/Time    Creatinine 1.24 08/23/2017 09:13 PM       Creatinine Clearance Estimated Creatinine Clearance: 24.9 mL/min (based on Cr of 1.24).      Temp   99.3 °F (37.4 °C)    WBC   Lab Results   Component Value Date/Time    WBC 10.1 08/24/2017 02:21 AM       H/H   Lab Results   Component Value Date/Time    HGB 11.1 08/24/2017 02:21 AM        Platelets   Lab Results   Component Value Date/Time    PLATELET 979 46/14/8292 02:21 AM          Pharmacist: Signed Hailey Fernandez Contact information: [phone]

## 2017-08-25 LAB
ANION GAP SERPL CALC-SCNC: 8 MMOL/L (ref 5–15)
BACTERIA SPEC CULT: NORMAL
BASOPHILS # BLD: 0 K/UL (ref 0–0.1)
BASOPHILS NFR BLD: 0 % (ref 0–1)
BUN SERPL-MCNC: 14 MG/DL (ref 6–20)
BUN/CREAT SERPL: 14 (ref 12–20)
CALCIUM SERPL-MCNC: 7.6 MG/DL (ref 8.5–10.1)
CC UR VC: NORMAL
CHLORIDE SERPL-SCNC: 109 MMOL/L (ref 97–108)
CO2 SERPL-SCNC: 24 MMOL/L (ref 21–32)
CREAT SERPL-MCNC: 1.01 MG/DL (ref 0.55–1.02)
DIFFERENTIAL METHOD BLD: ABNORMAL
EOSINOPHIL # BLD: 0 K/UL (ref 0–0.4)
EOSINOPHIL NFR BLD: 0 % (ref 0–7)
ERYTHROCYTE [DISTWIDTH] IN BLOOD BY AUTOMATED COUNT: 13.9 % (ref 11.5–14.5)
GLUCOSE BLD STRIP.AUTO-MCNC: 111 MG/DL (ref 65–100)
GLUCOSE BLD STRIP.AUTO-MCNC: 112 MG/DL (ref 65–100)
GLUCOSE BLD STRIP.AUTO-MCNC: 59 MG/DL (ref 65–100)
GLUCOSE BLD STRIP.AUTO-MCNC: 65 MG/DL (ref 65–100)
GLUCOSE BLD STRIP.AUTO-MCNC: 73 MG/DL (ref 65–100)
GLUCOSE BLD STRIP.AUTO-MCNC: 79 MG/DL (ref 65–100)
GLUCOSE BLD STRIP.AUTO-MCNC: 79 MG/DL (ref 65–100)
GLUCOSE SERPL-MCNC: 86 MG/DL (ref 65–100)
HCT VFR BLD AUTO: 34.5 % (ref 35–47)
HGB BLD-MCNC: 11.2 G/DL (ref 11.5–16)
LYMPHOCYTES # BLD: 1.3 K/UL (ref 0.8–3.5)
LYMPHOCYTES NFR BLD: 12 % (ref 12–49)
MCH RBC QN AUTO: 30.2 PG (ref 26–34)
MCHC RBC AUTO-ENTMCNC: 32.5 G/DL (ref 30–36.5)
MCV RBC AUTO: 93 FL (ref 80–99)
MONOCYTES # BLD: 0.4 K/UL (ref 0–1)
MONOCYTES NFR BLD: 4 % (ref 5–13)
NEUTS SEG # BLD: 9.2 K/UL (ref 1.8–8)
NEUTS SEG NFR BLD: 84 % (ref 32–75)
PERIPHERAL SMEAR,PSM: NORMAL
PLATELET # BLD AUTO: 126 K/UL (ref 150–400)
POTASSIUM SERPL-SCNC: 3.5 MMOL/L (ref 3.5–5.1)
RBC # BLD AUTO: 3.71 M/UL (ref 3.8–5.2)
RBC MORPH BLD: ABNORMAL
SERVICE CMNT-IMP: ABNORMAL
SERVICE CMNT-IMP: NORMAL
SODIUM SERPL-SCNC: 141 MMOL/L (ref 136–145)
VANCOMYCIN SERPL-MCNC: 3.1 UG/ML
WBC # BLD AUTO: 10.9 K/UL (ref 3.6–11)

## 2017-08-25 PROCEDURE — 36415 COLL VENOUS BLD VENIPUNCTURE: CPT | Performed by: FAMILY MEDICINE

## 2017-08-25 PROCEDURE — 80202 ASSAY OF VANCOMYCIN: CPT | Performed by: FAMILY MEDICINE

## 2017-08-25 PROCEDURE — 97116 GAIT TRAINING THERAPY: CPT

## 2017-08-25 PROCEDURE — 65660000000 HC RM CCU STEPDOWN

## 2017-08-25 PROCEDURE — 74011250636 HC RX REV CODE- 250/636: Performed by: FAMILY MEDICINE

## 2017-08-25 PROCEDURE — 74011250637 HC RX REV CODE- 250/637: Performed by: STUDENT IN AN ORGANIZED HEALTH CARE EDUCATION/TRAINING PROGRAM

## 2017-08-25 PROCEDURE — 80048 BASIC METABOLIC PNL TOTAL CA: CPT | Performed by: FAMILY MEDICINE

## 2017-08-25 PROCEDURE — 85025 COMPLETE CBC W/AUTO DIFF WBC: CPT | Performed by: FAMILY MEDICINE

## 2017-08-25 PROCEDURE — 74011250637 HC RX REV CODE- 250/637: Performed by: FAMILY MEDICINE

## 2017-08-25 PROCEDURE — 74011000258 HC RX REV CODE- 258: Performed by: FAMILY MEDICINE

## 2017-08-25 PROCEDURE — 82962 GLUCOSE BLOOD TEST: CPT

## 2017-08-25 RX ORDER — BISACODYL 5 MG
5 TABLET, DELAYED RELEASE (ENTERIC COATED) ORAL DAILY PRN
Status: DISCONTINUED | OUTPATIENT
Start: 2017-08-25 | End: 2017-08-27 | Stop reason: HOSPADM

## 2017-08-25 RX ORDER — POLYETHYLENE GLYCOL 3350 17 G/17G
17 POWDER, FOR SOLUTION ORAL DAILY
Status: DISCONTINUED | OUTPATIENT
Start: 2017-08-25 | End: 2017-08-27

## 2017-08-25 RX ORDER — GUAIFENESIN 600 MG/1
600 TABLET, EXTENDED RELEASE ORAL 2 TIMES DAILY
Status: DISCONTINUED | OUTPATIENT
Start: 2017-08-25 | End: 2017-08-27 | Stop reason: HOSPADM

## 2017-08-25 RX ORDER — POTASSIUM CHLORIDE 1.5 G/1.77G
40 POWDER, FOR SOLUTION ORAL 2 TIMES DAILY WITH MEALS
Status: COMPLETED | OUTPATIENT
Start: 2017-08-25 | End: 2017-08-25

## 2017-08-25 RX ADMIN — Medication 10 ML: at 14:04

## 2017-08-25 RX ADMIN — PIPERACILLIN SODIUM,TAZOBACTAM SODIUM 3.38 G: 3; .375 INJECTION, POWDER, FOR SOLUTION INTRAVENOUS at 03:34

## 2017-08-25 RX ADMIN — LOSARTAN POTASSIUM 50 MG: 50 TABLET, FILM COATED ORAL at 09:05

## 2017-08-25 RX ADMIN — PANTOPRAZOLE SODIUM 40 MG: 40 TABLET, DELAYED RELEASE ORAL at 17:45

## 2017-08-25 RX ADMIN — PANTOPRAZOLE SODIUM 40 MG: 40 TABLET, DELAYED RELEASE ORAL at 07:07

## 2017-08-25 RX ADMIN — GUAIFENESIN 600 MG: 600 TABLET, EXTENDED RELEASE ORAL at 14:02

## 2017-08-25 RX ADMIN — LEVOTHYROXINE SODIUM 50 MCG: 0.05 TABLET ORAL at 07:07

## 2017-08-25 RX ADMIN — PIPERACILLIN SODIUM,TAZOBACTAM SODIUM 3.38 G: 3; .375 INJECTION, POWDER, FOR SOLUTION INTRAVENOUS at 19:30

## 2017-08-25 RX ADMIN — GUAIFENESIN 600 MG: 600 TABLET, EXTENDED RELEASE ORAL at 17:45

## 2017-08-25 RX ADMIN — VANCOMYCIN HYDROCHLORIDE 1000 MG: 1 INJECTION, POWDER, LYOPHILIZED, FOR SOLUTION INTRAVENOUS at 09:58

## 2017-08-25 RX ADMIN — POTASSIUM CHLORIDE 40 MEQ: 1.5 POWDER, FOR SOLUTION ORAL at 17:45

## 2017-08-25 RX ADMIN — PIPERACILLIN SODIUM,TAZOBACTAM SODIUM 3.38 G: 3; .375 INJECTION, POWDER, FOR SOLUTION INTRAVENOUS at 14:03

## 2017-08-25 RX ADMIN — POLYETHYLENE GLYCOL 3350 17 G: 17 POWDER, FOR SOLUTION ORAL at 09:06

## 2017-08-25 RX ADMIN — POTASSIUM CHLORIDE 40 MEQ: 1.5 POWDER, FOR SOLUTION ORAL at 09:05

## 2017-08-25 RX ADMIN — ROSUVASTATIN CALCIUM 20 MG: 10 TABLET, FILM COATED ORAL at 09:05

## 2017-08-25 NOTE — PROGRESS NOTES
Bedside shift change report given to Georges Villa (oncoming nurse) by Iker Matthews (offgoing nurse). Report included the following information SBAR, Kardex, Procedure Summary, MAR and Recent Results.

## 2017-08-25 NOTE — PROGRESS NOTES
Spiritual Care Partner Volunteer visited patient in 200 on 8.24.17.   Documented by:    Luisa Finn M.Div, M.S, [place] [id number]

## 2017-08-25 NOTE — PROGRESS NOTES
Problem: Mobility Impaired (Adult and Pediatric)  Goal: *Acute Goals and Plan of Care (Insert Text)  Physical Therapy Goals  Initiated 8/24/2017  1. Patient will move from supine to sit and sit to supine , scoot up and down and roll side to side in bed with independence within 7 day(s). 2. Patient will transfer from bed to chair and chair to bed with independence using the least restrictive device within 7 day(s). 3. Patient will perform sit to stand with independence within 7 day(s). 4. Patient will ambulate with independence for 200 feet with the least restrictive device within 7 day(s). 5. Patient will ascend/descend 4 stairs with handrail(s) with independence within 7 day(s). PHYSICAL THERAPY TREATMENT  Patient: Haily Mejias (6 y.o. female)  Date: 8/25/2017  Diagnosis: Sepsis ([facility] [address]  CAP (community acquired pneumonia)  CAP (community acquired pneumonia) Sepsis ([facility] [address]       Precautions: DNR      ASSESSMENT:  Pt received in chair, agreeable to participate with physical therapy. Sit<>stand without AD with SBA . Demonstrates good standing balance. Gait training x 200' with CGA. Increased trunk sway during gait, no overt LOB. Ascend/desend 4 steps using single handrail on R with verbal cues to perform one step at a time for safety. Mild dyspnea noted post activity. Two family members present throughout session. Progression toward goals:  [X]    Improving appropriately and progressing toward goals  [ ]    Improving slowly and progressing toward goals  [ ]    Not making progress toward goals and plan of care will be adjusted       PLAN:  Patient continues to benefit from skilled intervention to address the above impairments. Continue treatment per established plan of care. Discharge Recommendations:  None  Further Equipment Recommendations for Discharge:  none       SUBJECTIVE:   Patient stated I am feeling grumpy.       OBJECTIVE DATA SUMMARY:   Critical Behavior:  Neurologic State: Alert  Orientation Level: Oriented X4  Cognition: Appropriate decision making, Appropriate for age attention/concentration, Appropriate safety awareness  Safety/Judgement: Fall prevention, Home safety, Awareness of environment  Functional Mobility Training:  Bed Mobility:                    Transfers:  Sit to Stand: Stand-by asssistance  Stand to Sit: Stand-by asssistance                             Balance:  Sitting: Intact  Standing: Intact; Without support  Ambulation/Gait Training:  Distance (ft): 200 Feet (ft)     Ambulation - Level of Assistance: Contact guard assistance        Gait Abnormalities: Altered arm swing;Path deviations;Trunk sway increased        Base of Support: Narrowed                                        Stairs:  Number of Stairs Trained: 4  Stairs - Level of Assistance: Contact guard assistance              Rail Use: Right   Neuro Re-Education:     Therapeutic Exercises:      Pain:                    Activity Tolerance:   Good  Please refer to the flowsheet for vital signs taken during this treatment.   After treatment:   [X]    Patient left in no apparent distress sitting up in chair  [ ]    Patient left in no apparent distress in bed  [X]    Call bell left within reach  [X]    Nursing notified  [X]    Caregiver present  [ ]    Bed alarm activated      COMMUNICATION/COLLABORATION:   The patients plan of care was discussed with: Registered Nurse     Magnus Whalen   Time Calculation: 17 mins

## 2017-08-25 NOTE — PROGRESS NOTES
Bedside shift change report given to [facility] (oncoming nurse) by Severiano Russell RN (offgoing nurse). Report included the following information SBAR, Kardex, Intake/Output, MAR and Accordion.

## 2017-08-25 NOTE — DIABETES MGMT
NURSING: HYPOGLYCEMIC RISK ASSESSMENT    Trisha Gilbert has an increased risk for hypoglycemia due to to the following conditions: advanced age    Noted glucose events: 73, 59, 79, 79 on 8/25/2017    Please continue to monitor BG levels, document po intake and follow the hypoglycemia protocol for treatment. Please document treatment, rechecked value, and physician notified in progress notes. You can use the smart text \". hypoglyce\" to document each episode. Any questions please call [facility] at [phone] (pager). Thank you. Lawson Emery.  Ethan Tello, RN, BSN, MPH  Diabetes [facility]

## 2017-08-25 NOTE — PROGRESS NOTES
[address] [facility] Dosing Services: Antimicrobial Stewardship Progress Note    Vancomycin level 3.1 mcg/ml  - 22 hours post dose #1  - Scr decreasing  - Started Vancomycin 1000 mg IV Q24h which is predicting a trough of 16.4 mcg/ml at current Scr of 1    Thank you,  Del Schwartz, PharmD

## 2017-08-25 NOTE — PROGRESS NOTES
[facility]   Daily Progress Note    Date: 8/25/2017  PCP: Harshil Johnson MD     Assessment/Plan:   Colette Wells is a 80 y.o. female with history of DM2, HTN, HLD, hypothyroidism, osteoporesis who has spent 1 night(s) in the hospital, who is admitted for sepsis due to CAP. 24 hour events:   - PT cleared as not needing any added services upon d/c. Sepsis secondary to CAP - improving - SIRS 0/4 over 24 hour (except for few low grade fevers, 3/4 SIRS poa), CT b/l lower lobe airspace opacities suspicious for PNA with cough, fever/chills. Wbc 10.9 (13.6 poa). Lactate 1.6, troponin*2 nml. Resp Cx with few GPR and occasional GPC, with occasional epithelial cells. - continue Vanc and zosyn - dosed per pharmacy  - continue albuterol/ipratropium q6h for sx relief  - Tylenol PRN for fever  - F/u BCx and UCx, and speciation and sensitivity for resp cx.   - continue daily CBC     Mild thrombocytopenia in setting of generalized weakness with progressive wt loss - 2/2 most likely dilutional for PLT and wt loss from poor intake (wt loss over a year). No rash or hepatosplenomegaly on exam, no hx of liver disease, no hx of systemic disease like SLE. No fever since abx cover (less likely viral or atypicals). CT chest with stable benign scattered multiple 4mm nodules in the lungs. Never had colonoscopy before. - FOBT pending (patient has not had stool) - given laxative (no BM for 4-5days)  - F/u nutrition consult   - F/u outpatient GI for screening colonoscopy     Constipation 2/2 most likely slow transit. No home meds.   - started daily miralax and prn dulcolax     Type II Diabetes - diet controlled, A1c 6 (9/27/16)  - Glucose checks q6h  - Regular diet as patient has not been eating and is very frail  - F/u nutrition consult       HTN - stable overnight  - Continue Losartan       HLD - , LDL 39, HDL 43,  (4/7/17)  - Continue Rosuvastatin       Hypothyroidism - POA TSH 0.35  - Continue Synthroid      Osteoporosis   - Hold Calcium    FEN/GI - regular diet. Activity - as tolerated   DVT prophylaxis - SCDs  GI prophylaxis -  Not indicated  Disposition - Plan to d/c to home. CODE STATUS:  DNR    Pt was discussed with Dr Rabia Gr. I appreciate the opportunity to participate in the care of this patient,    Augustus Walker MD  Family Medicine Resident         CC: none today    Subjective  No acute events overnight. Patient was sleeping well. Cough about the same. Denies fever, chills, chest pain, palpitations, shortness of breath, abdominal pain, nausea and vomiting, and LE edema. Has not had BM for 4-5 days. Inpatient Medications  Current Facility-Administered Medications   Medication Dose Route Frequency    potassium chloride (KLOR-CON) packet 40 mEq  40 mEq Oral BID WITH MEALS    polyethylene glycol (MIRALAX) packet 17 g  17 g Oral DAILY    bisacodyl (DULCOLAX) tablet 5 mg  5 mg Oral DAILY PRN    acetaminophen (TYLENOL) tablet 500 mg  500 mg Oral Q6H PRN    piperacillin-tazobactam (ZOSYN) 3.375 g in 0.9% sodium chloride (MBP/ADV) 100 mL  3.375 g IntraVENous Q8H    Vancomycin: pharmacy dosing by random level   Other Rx Dosing/Monitoring    albuterol 5mg / ipratropium 0.5mg neb solution  1 Dose Nebulization Q6H PRN    rosuvastatin (CRESTOR) tablet 20 mg  20 mg Oral DAILY    pantoprazole (PROTONIX) tablet 40 mg  40 mg Oral ACB&D    levothyroxine (SYNTHROID) tablet 50 mcg  50 mcg Oral ACB    losartan (COZAAR) tablet 50 mg  50 mg Oral DAILY    sodium chloride (NS) flush 5-10 mL  5-10 mL IntraVENous PRN    sodium chloride (NS) flush 5-10 mL  5-10 mL IntraVENous Q8H    sodium chloride (NS) flush 5-10 mL  5-10 mL IntraVENous PRN    0.9% sodium chloride infusion  100 mL/hr IntraVENous CONTINUOUS     Allergies  Allergies   Allergen Reactions    Advil Pm  [Ibuprofen-Diphenhydramine Cit]      Other reaction(s):  Adverse reaction to substance , Hives    Codeine Other (comments)     Causes stomach pains     Objective  Vitals:  Visit Vitals    /61 (BP 1 Location: Right arm, BP Patient Position: At rest)    Pulse 73    Temp 98.2 °F (36.8 °C)    Resp 16    Ht 5' 3\" (1.6 m)    Wt 121 lb 14.6 oz (55.3 kg)    SpO2 92%    Breastfeeding No    BMI 21.6 kg/m2      Temp (24hrs), Av.9 °F (37.2 °C), Min:98.2 °F (36.8 °C), Max:99.4 °F (37.4 °C)     O2 Flow Rate (L/min): 2 l/min   O2 Device: Room air    I/O:    Intake/Output Summary (Last 24 hours) at 17 08  Last data filed at 17 1751   Gross per 24 hour   Intake                0 ml   Output              300 ml   Net             -300 ml     Last 3 shifts:     1901 -  0700  In: -   Out: 400 [Urine:400]    Physical Exam  General:   Alert, cooperative, no acute distress   Head:   Atraumatic   Eyes:   Conjunctivae clear   ENT:  Oral mucosa normal   Neck:  Supple, trachea midline, no adenopathy   Lungs:   b/l crackles all throughout with wheezes   Heart:   Regular rhythm, no murmur   Abdomen:    Soft, non-tender, ND. No masses or organomegaly    Extremities:  No edema or DVT signs   Skin:  Warm and dry. No rashes or lesions   Neurologic:  Oriented.  No focal deficits       Urinary catheter:  none         Labs and Data:    Recent Labs      17   0159  17   0837  17   0221   WBC  10.9  11.5*  10.1   HGB  11.2*  11.6  11.1*   HCT  34.5*  36.5  33.9*   PLT  126*  124*  129*     Recent Labs      17   0159  17   0221  17   2113   NA  141  138  135*   K  3.5  3.6  3.8   CL  109*  108  102   CO2  24  22  26   GLU  86  138*  149*   BUN  14  17  18   CREA  1.01  1.11*  1.24*   CA  7.6*  7.4*  8.5   ALB   --    --   3.1*   SGOT   --    --   42*   ALT   --    --   25       Imaging/procedures:    Signed by: Magalys Vasquez MD  Resident, Family Medicine  17       Attending Note:

## 2017-08-26 LAB
ANION GAP SERPL CALC-SCNC: 8 MMOL/L (ref 5–15)
BASOPHILS # BLD: 0 K/UL (ref 0–0.1)
BASOPHILS NFR BLD: 0 % (ref 0–1)
BUN SERPL-MCNC: 12 MG/DL (ref 6–20)
BUN/CREAT SERPL: 15 (ref 12–20)
CALCIUM SERPL-MCNC: 8.1 MG/DL (ref 8.5–10.1)
CHLORIDE SERPL-SCNC: 109 MMOL/L (ref 97–108)
CO2 SERPL-SCNC: 22 MMOL/L (ref 21–32)
CREAT SERPL-MCNC: 0.81 MG/DL (ref 0.55–1.02)
EOSINOPHIL # BLD: 0 K/UL (ref 0–0.4)
EOSINOPHIL NFR BLD: 0 % (ref 0–7)
ERYTHROCYTE [DISTWIDTH] IN BLOOD BY AUTOMATED COUNT: 13.8 % (ref 11.5–14.5)
GLUCOSE BLD STRIP.AUTO-MCNC: 121 MG/DL (ref 65–100)
GLUCOSE BLD STRIP.AUTO-MCNC: 153 MG/DL (ref 65–100)
GLUCOSE BLD STRIP.AUTO-MCNC: 170 MG/DL (ref 65–100)
GLUCOSE BLD STRIP.AUTO-MCNC: 96 MG/DL (ref 65–100)
GLUCOSE SERPL-MCNC: 111 MG/DL (ref 65–100)
HCT VFR BLD AUTO: 36.9 % (ref 35–47)
HGB BLD-MCNC: 11.9 G/DL (ref 11.5–16)
LYMPHOCYTES # BLD: 1.2 K/UL (ref 0.8–3.5)
LYMPHOCYTES NFR BLD: 13 % (ref 12–49)
MCH RBC QN AUTO: 30.7 PG (ref 26–34)
MCHC RBC AUTO-ENTMCNC: 32.2 G/DL (ref 30–36.5)
MCV RBC AUTO: 95.3 FL (ref 80–99)
MONOCYTES # BLD: 0.5 K/UL (ref 0–1)
MONOCYTES NFR BLD: 5 % (ref 5–13)
NEUTS SEG # BLD: 7.7 K/UL (ref 1.8–8)
NEUTS SEG NFR BLD: 82 % (ref 32–75)
PLATELET # BLD AUTO: 108 K/UL (ref 150–400)
POTASSIUM SERPL-SCNC: 5 MMOL/L (ref 3.5–5.1)
RBC # BLD AUTO: 3.87 M/UL (ref 3.8–5.2)
RBC MORPH BLD: ABNORMAL
SERVICE CMNT-IMP: ABNORMAL
SERVICE CMNT-IMP: NORMAL
SODIUM SERPL-SCNC: 139 MMOL/L (ref 136–145)
WBC # BLD AUTO: 9.4 K/UL (ref 3.6–11)

## 2017-08-26 PROCEDURE — 74011000250 HC RX REV CODE- 250: Performed by: HOSPITALIST

## 2017-08-26 PROCEDURE — 74011000258 HC RX REV CODE- 258: Performed by: FAMILY MEDICINE

## 2017-08-26 PROCEDURE — 74011250636 HC RX REV CODE- 250/636: Performed by: FAMILY MEDICINE

## 2017-08-26 PROCEDURE — 36415 COLL VENOUS BLD VENIPUNCTURE: CPT | Performed by: FAMILY MEDICINE

## 2017-08-26 PROCEDURE — 74011250637 HC RX REV CODE- 250/637: Performed by: FAMILY MEDICINE

## 2017-08-26 PROCEDURE — 77030013140 HC MSK NEB VYRM -A

## 2017-08-26 PROCEDURE — 82962 GLUCOSE BLOOD TEST: CPT

## 2017-08-26 PROCEDURE — 85025 COMPLETE CBC W/AUTO DIFF WBC: CPT | Performed by: FAMILY MEDICINE

## 2017-08-26 PROCEDURE — 80048 BASIC METABOLIC PNL TOTAL CA: CPT | Performed by: FAMILY MEDICINE

## 2017-08-26 PROCEDURE — 77010033678 HC OXYGEN DAILY

## 2017-08-26 PROCEDURE — 65660000000 HC RM CCU STEPDOWN

## 2017-08-26 PROCEDURE — 94640 AIRWAY INHALATION TREATMENT: CPT

## 2017-08-26 RX ORDER — DOXYCYCLINE HYCLATE 100 MG
100 TABLET ORAL EVERY 12 HOURS
Status: DISCONTINUED | OUTPATIENT
Start: 2017-08-26 | End: 2017-08-27 | Stop reason: HOSPADM

## 2017-08-26 RX ORDER — LEVALBUTEROL INHALATION SOLUTION 1.25 MG/3ML
1.25 SOLUTION RESPIRATORY (INHALATION)
Status: DISCONTINUED | OUTPATIENT
Start: 2017-08-26 | End: 2017-08-26

## 2017-08-26 RX ORDER — IPRATROPIUM BROMIDE AND ALBUTEROL SULFATE 2.5; .5 MG/3ML; MG/3ML
3 SOLUTION RESPIRATORY (INHALATION) 3 TIMES DAILY
Status: DISCONTINUED | OUTPATIENT
Start: 2017-08-26 | End: 2017-08-26

## 2017-08-26 RX ORDER — LEVALBUTEROL INHALATION SOLUTION 1.25 MG/3ML
1.25 SOLUTION RESPIRATORY (INHALATION)
Status: DISCONTINUED | OUTPATIENT
Start: 2017-08-26 | End: 2017-08-27 | Stop reason: HOSPADM

## 2017-08-26 RX ADMIN — GUAIFENESIN 600 MG: 600 TABLET, EXTENDED RELEASE ORAL at 17:13

## 2017-08-26 RX ADMIN — LEVALBUTEROL 1.25 MG: 1.25 SOLUTION RESPIRATORY (INHALATION) at 13:47

## 2017-08-26 RX ADMIN — LEVALBUTEROL 1.25 MG: 1.25 SOLUTION RESPIRATORY (INHALATION) at 19:42

## 2017-08-26 RX ADMIN — ROSUVASTATIN CALCIUM 20 MG: 10 TABLET, FILM COATED ORAL at 09:14

## 2017-08-26 RX ADMIN — VANCOMYCIN HYDROCHLORIDE 1000 MG: 1 INJECTION, POWDER, LYOPHILIZED, FOR SOLUTION INTRAVENOUS at 09:14

## 2017-08-26 RX ADMIN — PANTOPRAZOLE SODIUM 40 MG: 40 TABLET, DELAYED RELEASE ORAL at 17:13

## 2017-08-26 RX ADMIN — PIPERACILLIN SODIUM,TAZOBACTAM SODIUM 3.38 G: 3; .375 INJECTION, POWDER, FOR SOLUTION INTRAVENOUS at 04:17

## 2017-08-26 RX ADMIN — DOXYCYCLINE HYCLATE 100 MG: 100 TABLET, COATED ORAL at 10:00

## 2017-08-26 RX ADMIN — GUAIFENESIN 600 MG: 600 TABLET, EXTENDED RELEASE ORAL at 09:14

## 2017-08-26 RX ADMIN — LEVOTHYROXINE SODIUM 50 MCG: 0.05 TABLET ORAL at 06:54

## 2017-08-26 RX ADMIN — Medication 10 ML: at 22:54

## 2017-08-26 RX ADMIN — PANTOPRAZOLE SODIUM 40 MG: 40 TABLET, DELAYED RELEASE ORAL at 06:54

## 2017-08-26 RX ADMIN — DOXYCYCLINE HYCLATE 100 MG: 100 TABLET, COATED ORAL at 20:39

## 2017-08-26 RX ADMIN — LOSARTAN POTASSIUM 50 MG: 50 TABLET, FILM COATED ORAL at 09:14

## 2017-08-26 NOTE — PROGRESS NOTES
Bedside shift change report given to [facility] RN (oncoming nurse) by Ángela Whitaker RN (offgoing nurse). Report included the following information SBAR, Kardex, Intake/Output, MAR and Accordion. Yes

## 2017-08-26 NOTE — DISCHARGE SUMMARY
[facility] [address] [place] [address]   Office [phone]  Fax [phone]       Discharge / Transfer / Off-Service Note     Name: Jessie Benz MRN: [id number]  Sex: Female   YOB: 1927  Age: 80 y.o. PCP: Reji Guaman MD     Date of admission: 2017  Date of discharge/transfer: 2017    Attending physician at admission: Dr. Shaunna Lanes  Attending physician at discharge/transfer: Dr. Shaunna Lanes  Resident physician at discharge/transfer: Zetta Apgar, MD     Consultants during hospitalization  none     Admission diagnoses   Sepsis ([address]  CAP (community acquired pneumonia)  CAP (community acquired pneumonia)    Recommended follow-up after discharge  · PCP     History of Present Illness    As per admitting provider, Dr. Cassie Leblanc, Wendy Jackson is a 80 y.o. female with a history of DM2, HTN, HLD, hypothyroidism, osteoporosis, and hx of falls who presented for cough that started 10 days ago and has been worsening for the last week. Cough is productive with clear sputum. Pt had gone to an urgent care and then was sent to ED. She was previously treated for pneumonia as an outpatient in . Pt endorses chills. Sob, and wheezing for the past 2-3 days. Daughters state that pt's appetite has been declining for the past year and had lost 20lbs during this time. In the ER, vital signs were remarkable for tachycardia 108, RR 20, hypoxic 92%. Labs were remarkable for WBC 13.6, mild hyponatremia 135, Cr 1.24, GFR 49. CXR was unremarkable. CT showed mild patchy bilateral lower lobe airspace opacities suggestive of pneumonia. Pt was treated with duonebs, 1L bolus, IV ceftriaxone 1g, and IV 500mg Azithromycin. [facility] course  Sepsis secondary to CAP - improved at d/c - Pt had a bad cough with crackles all throughout. Resp Cx grew scant possible staph aureus. BCx had no growth for 4days. UCx NG for 1day. Pt was given albuterol/ipratropium q6h for sx relief.  Pt was changed from Vanc/zosyn to po Doxycycline.  - F/u sensitivity   - continue doxycycline for 6 more days for total 10 days. - use xopenex for wheezing  - f/u with PCP      Mild thrombocytopenia in setting of generalized weakness with progressive wt loss - dilutional for PLT and wt loss from poor intake (wt loss over a year). Stable at d/c (150). Pt was worked up for other possible reasons. Pt had no rash or hepatosplenomegaly on exam, no hx of liver disease, no hx of systemic disease like SLE. No fever since abx coverage (less likely viral or atypicals). CT chest with stable benign scattered multiple 4mm nodules in the lungs. The only thing was pt never had colonoscopy before. Nutrition was also consulted with recommendation to eat high protein diet with ensure. - F/u outpatient GI for screening colonoscopy       Constipation 2/2 most likely slow transit. No home meds. Pt was started on daily miralax and prn dulcolax. It was discontinued on d/c as patient was having looser stools. - F/u with PCP       Type II Diabetes - well controlled  With diet, A1c 5.9 (4/8/2017). Continue as is but with the nutrition modification stated above.       HTN - stable. - Continue Losartan       HLD - , LDL 39, HDL 43,  (4/7/17)  - Continue home rosuvastatin       Hypothyroidism - POA TSH 0.35  - Continue home synthroid      Osteoporosis- calcium was held during hospital stay. May resume at d/c   - f/u with PCP for possible bisphosphonate addition     Physical exam at discharge:    Vitals Reviewed. General Oriented to person, place, and time and well-developed. Appears well-nourished, no distress. Not diaphoretic. HENT Head Normocephalic and atraumatic. Eyes Conjunctivae are normal, no discharge. No scleral icterus. Nose Nose normal, clear turbinates. Oral Oropharynx is clear and moist. No oropharyngeal exudate. Neck No thyromegaly present. No cervical adenopathy.     Cardio Normal rate, regular rhythm. Exam reveals no gallop and no friction rub. No murmur heard. No chest wall tenderness. Pulmonary Effort normal (improved), breath sounds much improved (had some crackles but much improved) No respiratory distress. Still had some wheezes. Abdominal Soft. Bowel sounds normal. No distension. No tenderness.  Deferred. Extremities No edema of lower extremities. No tenderness. Distal pulses intact. Neurological Alert and oriented to person, place, and time. Dermatology Skin is warm and dry. No rash noted. No erythema or pallor. Psychiatric Affect and judgment normal.        Condition at discharge: Stable. Labs  Recent Labs      08/27/17   0201  08/26/17   0412  08/25/17   0159   WBC  8.5  9.4  10.9   HGB  11.0*  11.9  11.2*   HCT  33.6*  36.9  34.5*   PLT  150  108*  126*     Recent Labs      08/27/17   0201  08/26/17   0412  08/25/17   0159   NA  141  139  141   K  3.7  5.0  3.5   CL  108  109*  109*   CO2  28  22  24   BUN  11  12  14   CREA  0.87  0.81  1.01   GLU  140*  111*  86   CA  8.0*  8.1*  7.6*     No results for input(s): SGOT, GPT, ALT, AP, TBIL, TBILI, TP, ALB, GLOB, GGT, AML, LPSE in the last 72 hours. No lab exists for component: AMYP, HLPSE  Recent Labs      08/27/17   1607  08/27/17   1124  08/27/17   0724  08/26/17   2110  08/26/17   1634   GLUCPOC  145*  166*  124*  170*  153*     Cultures  · UCx, BCx    Procedures / Diagnostic Studies  · CXR    Imaging    Results from [place] encounter on 08/23/17   XR CHEST PORT   Narrative Clinical indication: Cough. Portable AP upright view of the chest is obtained, comparison to 2008. The heart  size is slightly prominent. There is no acute infiltrate. Impression impression: No acute infiltrate. No results found for this or any previous visit.            Results from [place] encounter on 08/23/17   CT CHEST WO CONT   Narrative EXAM:  CT thorax without contrast    INDICATION:  Weight loss. New oxygen requirement. COMPARISON: Chest radiograph 8/23/2017. CT chest 4/14/2008. TECHNIQUE: Helical CT of the chest is performed without intravenous contrast.  Coronal and sagittal reformatted images are obtained. CT dose reduction was  achieved through use of a standardized protocol tailored for this examination  and automatic exposure control for dose modulation. FINDINGS: Evaluation of the solid organs is limited without intravenous  contrast. The aorta and main pulmonary artery are normal in caliber. 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 The cardiac size is within normal limits. There is  coronary artery atherosclerosis. No pericardial effusion. No lymphadenopathy by  CT size criteria. There is mild patchy airspace opacity in the lower lobes. There are several  stable benign scattered lung nodules measuring up to 4 mm. No pneumothorax or  pleural effusion. The central airways are unremarkable. In the limited images of the upper abdomen, the partially imaged solid organs  are unremarkable. There is no aggressive bony lesion. Impression IMPRESSION:   Mild patchy bilateral lower lobe airspace opacities suggestive of pneumonia. No procedure found. Chronic diagnoses   Problem List as of 8/27/2017  Date Reviewed: 4/20/2017          Codes Class Noted - Resolved    CAP (community acquired pneumonia) ICD-10-CM: J18.9  ICD-9-CM: 762  8/23/2017 - Present        * (Principal)Sepsis ([facility] ICD-10-CM: A41.9  ICD-9-CM: 038.9, 995.91  8/23/2017 - Present        Fall at home ICD-10-CM: [address] [facility], [id number]  ICD-9-CM: V625.9, E849.0  2/23/2015 - Present        Right shoulder pain ICD-10-CM: M25.511  ICD-9-CM: 719.41  2/23/2015 - Present        Facial bruising ICD-10-CM: Q02.76AT  ICD-9-CM: 431  2/23/2015 - Present        Osteoporosis ICD-10-CM: M81.0  ICD-9-CM: 733.00  9/19/2012 - Present        Chronic back pain ICD-10-CM: M54.9, G89.29  ICD-9-CM: 724.5, 338.29  4/13/2012 - Present Type 2 diabetes mellitus without complication, without long-term current use of insulin (HCC) ICD-10-CM: E11.9  ICD-9-CM: 250.00  7/14/2009 - Present        Essential hypertension, benign ICD-10-CM: I10  ICD-9-CM: 401.1  7/14/2009 - Present        Hyperlipidemia ICD-10-CM: E78.5  ICD-9-CM: 272.4  7/14/2009 - Present        Hypothyroidism ICD-10-CM: E03.9  ICD-9-CM: 244.9  7/14/2009 - Present        Allergic rhinitis, cause unspecified ICD-10-CM: J30.9  ICD-9-CM: 477.9  7/14/2009 - Present              Discharge/Transfer Medications  Discharge Medication List as of 8/27/2017  3:46 PM      START taking these medications    Details   levalbuterol (XOPENEX) 1.25 mg/3 mL nebu 3 mL by Nebulization route every six (6) hours as needed for up to 30 days. , Print, Disp-180 mL, R-0         CONTINUE these medications which have CHANGED    Details   doxycycline (VIBRA-TABS) 100 mg tablet Take 1 Tab by mouth every twelve (12) hours for 6 days. , Print, Disp-12 Tab, R-0         CONTINUE these medications which have NOT CHANGED    Details   rosuvastatin (CRESTOR) 20 mg tablet TAKE 1 TABLET BY MOUTH EVERY DAY, Normal, Disp-90 Tab, R-1      omeprazole (PRILOSEC) 20 mg capsule TAKE 2 CAPSULES BY MOUTH EVERY DAY, Normal, Disp-90 Cap, R-1      levothyroxine (SYNTHROID) 50 mcg tablet TAKE 1 TABLET BY MOUTH EVERY DAY BEFORE BREAKFAST, Normal, Disp-90 Tab, R-1      losartan (COZAAR) 50 mg tablet TAKE 1 TABLET BY MOUTH EVERY DAY, Normal, Disp-90 Tab, R-1      glucose blood VI test strips (ONETOUCH ULTRA TEST) strip TEST ONCE A DAY/Dx Code: E11.9, Normal, Disp-100 Strip, R-1      Lancets misc TEST ONCE A DAY/Dx Code: E11.9, Normal, Disp-1 Package, R-11      LACTOSE-REDUCED FOOD (PROTEIN NUTRITIONAL SHAKE PO) Take 1 Bottle by mouth daily. , Historical Med      Blood-Glucose Meter monitoring kit ONE TOUCH GLUCOMETER., Normal, Disp-1 Kit, R-0      cholecalciferol, vitamin d3, (VITAMIN D) 1,000 unit tablet Take 1,000 Units by mouth daily. , Historical Med      calcium carbonate (CALCIUM 500) 500 mg (1,250 mg) tablet Take 1,250 mg by mouth daily. , Historical Med      aspirin delayed-release 81 mg tablet take 81 mg by mouth daily. , Historical Med      PROAIR RESPICLICK 90 mcg/actuation aepb USE 2 INHALATIONS BY MOUTH EVERY 4 TO 6 HOURS AS NEEDED, Historical Med, R-0, MOHINI      cetirizine (ZYRTEC) 10 mg tablet Take 10 mg by mouth daily. , Historical Med      alendronate (FOSAMAX) 70 mg tablet TAKE 1 TABLET BY MOUTH EVERY SUNDAY FOR 30 DAYS, Normal, Disp-4 Tab, R-11              Diet:  Regular diet.     Activity:  As tolerated    Disposition: Home or Self Care    Discharge instructions to patient/family  Please seek medical attention for any new or worsening symptoms particularly fever, chest pain, shortness of breath, abdominal pain, nausea, vomiting    Follow up plans/appointments  Follow-up Information     Follow up With Details Comments Gregorio Oconnor MD Schedule an appointment as soon as possible for a visit in 3 days hospital follow up [address] [place] [address]             Carlos Little MD  Family Medicine Resident       For Billing    Chief Complaint   Patient presents with   [facility] Problems  Date Reviewed: 4/20/2017          Codes Class Noted POA    CAP (community acquired pneumonia) ICD-10-CM: J18.9  ICD-9-CM: 890  8/23/2017 Yes        * (Principal)Sepsis ([facility] ICD-10-CM: A41.9  ICD-9-CM: 038.9, 995.91  8/23/2017 Yes        Type 2 diabetes mellitus without complication, without long-term current use of insulin ([facility] ICD-10-CM: E11.9  ICD-9-CM: 250.00  7/14/2009 Yes        Essential hypertension, benign ICD-10-CM: I10  ICD-9-CM: 401.1  7/14/2009 Yes        Hyperlipidemia ICD-10-CM: E78.5  ICD-9-CM: 272.4  7/14/2009 Yes        Hypothyroidism ICD-10-CM: E03.9  ICD-9-CM: 244.9  7/14/2009 Yes

## 2017-08-26 NOTE — PROGRESS NOTES
Bedside and Verbal shift change report given to ELIZA Leach (oncoming nurse) by Avinash Shultz (offgoing nurse). Report included the following information SBAR, Kardex, Intake/Output, MAR and Recent Results.

## 2017-08-26 NOTE — PROGRESS NOTES
[facility] FAMILY MEDICINE RESIDENCY PROGRAM   Daily Progress Note    Date: 8/26/2017  PCP: Raiza Lovelace MD     Assessment/Plan:   Ambar Scott is a 80 y.o. female with history of DM2, HTN, HLD, hypothyroidism, osteoporesis who has spent 2 night(s) in the hospital, who is admitted for sepsis due to CAP.     Sepsis secondary to CAP - improving -  Resp Cx show scant possible staph aureus, few GPR and occasional GPC. F/u sensitivity  - Stop Vanc/zosyn and start Doxycycline  - continue albuterol/ipratropium q6h for sx relief  - Tylenol PRN for fever  - BCx- no growth in 3days. -UCx- no growth in 1 day  - continue daily CBC      Mild thrombocytopenia in setting of generalized weakness with progressive wt loss - Likely dilutional for PLT and wt loss from poor intake (wt loss over a year). No rash or hepatosplenomegaly on exam, no hx of liver disease, no hx of systemic disease like SLE. No fever since abx cover (less likely viral or atypicals). CT chest with stable benign scattered multiple 4mm nodules in the lungs. Never had colonoscopy before. - FOBT pending (patient has not had stool) - given laxative  - F/u nutrition consult   - F/u outpatient GI for screening colonoscopy      Constipation 2/2 most likely slow transit. No home meds. - started daily miralax and prn dulcolax      Type II Diabetes - diet controlled, A1c 5.9 (4/8/2017)  - Glucose checks q6h  - Regular diet as patient has not been eating and is very frail  - F/u nutrition consult       HTN - stable overnight  - Continue Losartan       HLD - , LDL 39, HDL 43,  (4/7/17)  - Continue Rosuvastatin       Hypothyroidism - POA TSH 0.35  - Continue Synthroid      Osteoporosis   - Hold Calcium     FEN/GI - regular diet. Activity - as tolerated   DVT prophylaxis - SCDs  GI prophylaxis -  Not indicated  Disposition - Plan to d/c to home.      CODE STATUS:  DNR     Pt was discussed with Dr Jessica Walters.      I appreciate the opportunity to participate in the care of this patient,    Everette Solis MD  [facility] Medicine Resident       Subjective  No acute events overnight. Patient reports sleeping well and has no concerns this morning. Denies fever, chills, chest pain, palpitations, shortness of breath, abdominal pain, nausea and vomiting, and LE edema. Yet to have a bowel movement. Patient has been coughing a lot. Inpatient Medications  Current Facility-Administered Medications   Medication Dose Route Frequency    polyethylene glycol (MIRALAX) packet 17 g  17 g Oral DAILY    bisacodyl (DULCOLAX) tablet 5 mg  5 mg Oral DAILY PRN    vancomycin (VANCOCIN) 1,000 mg in 0.9% sodium chloride (MBP/ADV) 250 mL  1 g IntraVENous Q24H    guaiFENesin ER (MUCINEX) tablet 600 mg  600 mg Oral BID    acetaminophen (TYLENOL) tablet 500 mg  500 mg Oral Q6H PRN    piperacillin-tazobactam (ZOSYN) 3.375 g in 0.9% sodium chloride (MBP/ADV) 100 mL  3.375 g IntraVENous Q8H    albuterol 5mg / ipratropium 0.5mg neb solution  1 Dose Nebulization Q6H PRN    rosuvastatin (CRESTOR) tablet 20 mg  20 mg Oral DAILY    pantoprazole (PROTONIX) tablet 40 mg  40 mg Oral ACB&D    levothyroxine (SYNTHROID) tablet 50 mcg  50 mcg Oral ACB    losartan (COZAAR) tablet 50 mg  50 mg Oral DAILY    sodium chloride (NS) flush 5-10 mL  5-10 mL IntraVENous PRN    sodium chloride (NS) flush 5-10 mL  5-10 mL IntraVENous Q8H    sodium chloride (NS) flush 5-10 mL  5-10 mL IntraVENous PRN         Allergies  Allergies   Allergen Reactions    Advil Pm  [Ibuprofen-Diphenhydramine Cit]      Other reaction(s):  Adverse reaction to substance , Hives    Codeine Other (comments)     Causes stomach pains         Objective  Vitals:  Visit Vitals    /63 (BP 1 Location: Left arm, BP Patient Position: At rest)    Pulse 83    Temp 99.4 °F (37.4 °C)    Resp 16    Ht 5' 3\" (1.6 m)    Wt 121 lb 14.6 oz (55.3 kg)    SpO2 96%    Breastfeeding No    BMI 21.6 kg/m2      Temp (24hrs), Av.8 °F (37.1 °C), Min:98.2 °F (36.8 °C), Max:99.4 °F (37.4 °C)     O2 Flow Rate (L/min): 3 l/min   O2 Device: Nasal cannula    I/O:  No intake or output data in the 24 hours ending 17 0606  Last 3 shifts:    701 -  190  In: -   Out: 300 [Urine:300]    Physical Exam  General:   Alert, cooperative, no acute distress   Head:   Atraumatic   Eyes:   Conjunctivae clear   ENT:  Oral mucosa normal   Neck:  Supple, trachea midline, no adenopathy   No JVD   Lungs:   Coarse breath sounds. Crackles on auscultation bilaterally. Normal work of breathing    Heart:   Regular rhythm, no murmur   Abdomen:    Soft, non-tender   No masses or organomegaly    Extremities:  No edema or DVT signs   Skin:  Warm and dry    No rashes or lesions   Neurologic:  Oriented   No focal deficits       Urinary catheter:  None         Labs and Data:    Telemetry: Sinus at 85. Some non-sustained PVCs    Recent Labs      17   0412  17   0159  17   0837   WBC  9.4  10.9  11.5*   HGB  11.9  11.2*  11.6   HCT  36.9  34.5*  36.5   PLT  108*  126*  124*     Recent Labs      17   0412  17   0159  17   0221  17   2113   NA  139  141  138  135*   K  5.0  3.5  3.6  3.8   CL  109*  109*  108  102   CO2  22  24  22  26   GLU  111*  86  138*  149*   BUN  12  14  17  18   CREA  0.81  1.01  1.11*  1.24*   CA  8.1*  7.6*  7.4*  8.5   ALB   --    --    --   3.1*   SGOT   --    --    --   42*   ALT   --    --    --   25         Signed by:  Katie Wang MD  Resident, Family Medicine  17       Attending Note:

## 2017-08-27 VITALS
HEIGHT: 63 IN | WEIGHT: 121.91 LBS | OXYGEN SATURATION: 95 % | DIASTOLIC BLOOD PRESSURE: 68 MMHG | TEMPERATURE: 98.1 F | BODY MASS INDEX: 21.6 KG/M2 | HEART RATE: 72 BPM | SYSTOLIC BLOOD PRESSURE: 155 MMHG | RESPIRATION RATE: 16 BRPM

## 2017-08-27 LAB
ANION GAP SERPL CALC-SCNC: 5 MMOL/L (ref 5–15)
BACTERIA SPEC CULT: ABNORMAL
BACTERIA SPEC CULT: ABNORMAL
BASOPHILS # BLD: 0 K/UL (ref 0–0.1)
BASOPHILS NFR BLD: 0 % (ref 0–1)
BUN SERPL-MCNC: 11 MG/DL (ref 6–20)
BUN/CREAT SERPL: 13 (ref 12–20)
CALCIUM SERPL-MCNC: 8 MG/DL (ref 8.5–10.1)
CHLORIDE SERPL-SCNC: 108 MMOL/L (ref 97–108)
CO2 SERPL-SCNC: 28 MMOL/L (ref 21–32)
CREAT SERPL-MCNC: 0.87 MG/DL (ref 0.55–1.02)
EOSINOPHIL # BLD: 0 K/UL (ref 0–0.4)
EOSINOPHIL NFR BLD: 0 % (ref 0–7)
ERYTHROCYTE [DISTWIDTH] IN BLOOD BY AUTOMATED COUNT: 13.3 % (ref 11.5–14.5)
GLUCOSE BLD STRIP.AUTO-MCNC: 124 MG/DL (ref 65–100)
GLUCOSE BLD STRIP.AUTO-MCNC: 145 MG/DL (ref 65–100)
GLUCOSE BLD STRIP.AUTO-MCNC: 166 MG/DL (ref 65–100)
GLUCOSE SERPL-MCNC: 140 MG/DL (ref 65–100)
GRAM STN SPEC: ABNORMAL
HCT VFR BLD AUTO: 33.6 % (ref 35–47)
HGB BLD-MCNC: 11 G/DL (ref 11.5–16)
LYMPHOCYTES # BLD: 1.3 K/UL (ref 0.8–3.5)
LYMPHOCYTES NFR BLD: 16 % (ref 12–49)
MCH RBC QN AUTO: 30.2 PG (ref 26–34)
MCHC RBC AUTO-ENTMCNC: 32.7 G/DL (ref 30–36.5)
MCV RBC AUTO: 92.3 FL (ref 80–99)
MONOCYTES # BLD: 0.7 K/UL (ref 0–1)
MONOCYTES NFR BLD: 8 % (ref 5–13)
NEUTS SEG # BLD: 6.5 K/UL (ref 1.8–8)
NEUTS SEG NFR BLD: 76 % (ref 32–75)
PLATELET # BLD AUTO: 150 K/UL (ref 150–400)
POTASSIUM SERPL-SCNC: 3.7 MMOL/L (ref 3.5–5.1)
RBC # BLD AUTO: 3.64 M/UL (ref 3.8–5.2)
SERVICE CMNT-IMP: ABNORMAL
SODIUM SERPL-SCNC: 141 MMOL/L (ref 136–145)
WBC # BLD AUTO: 8.5 K/UL (ref 3.6–11)

## 2017-08-27 PROCEDURE — 80048 BASIC METABOLIC PNL TOTAL CA: CPT | Performed by: FAMILY MEDICINE

## 2017-08-27 PROCEDURE — 82962 GLUCOSE BLOOD TEST: CPT

## 2017-08-27 PROCEDURE — 74011250637 HC RX REV CODE- 250/637: Performed by: FAMILY MEDICINE

## 2017-08-27 PROCEDURE — 94640 AIRWAY INHALATION TREATMENT: CPT

## 2017-08-27 PROCEDURE — 85025 COMPLETE CBC W/AUTO DIFF WBC: CPT | Performed by: FAMILY MEDICINE

## 2017-08-27 PROCEDURE — 74011000250 HC RX REV CODE- 250: Performed by: HOSPITALIST

## 2017-08-27 PROCEDURE — 36415 COLL VENOUS BLD VENIPUNCTURE: CPT | Performed by: FAMILY MEDICINE

## 2017-08-27 RX ORDER — DOXYCYCLINE HYCLATE 100 MG
100 TABLET ORAL EVERY 12 HOURS
Qty: 12 TAB | Refills: 0 | Status: SHIPPED | OUTPATIENT
Start: 2017-08-27 | End: 2017-08-27

## 2017-08-27 RX ORDER — LEVALBUTEROL INHALATION SOLUTION 1.25 MG/3ML
1.25 SOLUTION RESPIRATORY (INHALATION)
Qty: 180 ML | Refills: 0 | Status: SHIPPED | OUTPATIENT
Start: 2017-08-27 | End: 2017-09-26

## 2017-08-27 RX ORDER — DOXYCYCLINE HYCLATE 100 MG
100 TABLET ORAL EVERY 12 HOURS
Qty: 12 TAB | Refills: 0 | Status: SHIPPED | OUTPATIENT
Start: 2017-08-27 | End: 2017-09-02

## 2017-08-27 RX ADMIN — LEVOTHYROXINE SODIUM 50 MCG: 0.05 TABLET ORAL at 08:22

## 2017-08-27 RX ADMIN — PANTOPRAZOLE SODIUM 40 MG: 40 TABLET, DELAYED RELEASE ORAL at 08:22

## 2017-08-27 RX ADMIN — Medication 10 ML: at 06:27

## 2017-08-27 RX ADMIN — LEVALBUTEROL 1.25 MG: 1.25 SOLUTION RESPIRATORY (INHALATION) at 13:05

## 2017-08-27 RX ADMIN — LOSARTAN POTASSIUM 50 MG: 50 TABLET, FILM COATED ORAL at 08:22

## 2017-08-27 RX ADMIN — ROSUVASTATIN CALCIUM 20 MG: 10 TABLET, FILM COATED ORAL at 08:22

## 2017-08-27 RX ADMIN — GUAIFENESIN 600 MG: 600 TABLET, EXTENDED RELEASE ORAL at 08:22

## 2017-08-27 RX ADMIN — LEVALBUTEROL 1.25 MG: 1.25 SOLUTION RESPIRATORY (INHALATION) at 06:45

## 2017-08-27 RX ADMIN — DOXYCYCLINE HYCLATE 100 MG: 100 TABLET, COATED ORAL at 08:22

## 2017-08-27 NOTE — DISCHARGE INSTRUCTIONS
HOME DISCHARGE INSTRUCTIONS    Zaki Reyes / [id number] : 1927    Admission date: 2017 Discharge date: 2017     Please bring this form with you to show your care provider at your follow-up appointment. Primary care provider:  Helen Rosa MD    Discharging provider:  Beverly Lau MD  - Family Medicine Resident  Monique Chapa MD - Attending, Family Medicine     You have been admitted to the hospital with the following diagnoses:    ACUTE DIAGNOSES:  · Sepsis ([address]  · CAP (community acquired pneumonia)  · CAP (community acquired pneumonia)  . . . . . . . . . . . . . . . . . . . . . . . . . . . . . . . . . . . . . . . . . . . . . . . . . . . . . . . . . . . . . . . . . . . . . . . Simeon Loza FOLLOW-UP CARE RECOMMENDATIONS:    Medication changes: none    Take Doxycycline 100mg twice a day for 6 more days. Take xopenex as needed daily    Follow-up Information     Follow up With Details Comments [address] [facility] [address], [place] Schedule an appointment as soon as possible for a visit in 3 days hospital follow up [address] [place] [address] [phone]            Appointments: Bharti Pack on page 2 of these documents. Follow-up tests needed: none    Pending test results: At the time of your discharge the following test results are still pending: none. Please make sure you review these results with your outpatient follow-up provider(s). Specific symptoms to watch for: chest pain, shortness of breath, fever, chills, nausea, vomiting, diarrhea, change in mentation, falling, weakness, bleeding. DIET/what to eat: Diabetic Diet    ACTIVITY:  Activity as tolerated    Wound care: none    Equipment needed:  none    What to do if new or unexpected symptoms occur? If you experience any of the above symptoms (or should other concerns or questions arise after discharge) please call your primary care physician.  Return to the emergency room if you cannot get hold of your doctor. · It is very important that you keep your follow-up appointment(s). · Please bring discharge papers, medication list (and/or medication bottles) to your follow-up appointments for review by your outpatient provider(s). · Please check the list of medications and be sure it includes every medication (even non-prescription medications) that your provider wants you to take. · It is important that you take the medication exactly as they are prescribed. · Keep your medication in the bottles provided by the pharmacist and keep a list of the medication names, dosages, and times to be taken in your wallet. · Do not take other medications without consulting your doctor. · If you have any questions about your medications or other instructions, please talk to your nurse or care provider before you leave the hospital.     Information obtained by:     I understand that if any problems occur once I am at home I am to contact my physician. These instructions were explained to me and I had the opportunity to ask questions. I understand and acknowledge receipt of the instructions indicated above.                                                                                                                                                Physician's or R.N.'s Signature                                                                  Date/Time                                                                                                                                              Patient or Representative Signature                                                          Date/Time

## 2017-08-27 NOTE — PROGRESS NOTES
8/27/2017 5:32 PM Orders received. Discussed with the pt. Pt would like [facility]. Referral sent to [facility] via [address]. Met with the pt. Pt provided with the home nebulizer. Discussed with the RN. Plan is for the pt to return to their home. Briana Masters     8/27/2017 4:02 PM Discussed case with RN. Discussed case with [address] [place] [address] Medicine. Requested orders for home nebulizer.    INEZ Masters

## 2017-08-27 NOTE — PROGRESS NOTES
Patient discharged with discharge instructions. Opportunity for questions given. Patient and family members verbalized understanding. Rose, Respiratory Therapist at bedside for nebulizer education. Prescriptions given. Patient escorted via wheelchair by PCT to car.

## 2017-08-27 NOTE — ROUTINE PROCESS
Orthopedic & Surgical Nursing Communication Tool      8:22 AM  8/27/2017     Bedside and Verbal shift change report given to Mariana Turner RN (incoming nurse) by Kelsy Mckeon RN (outgoing nurse) on Anice Scot a 80 y.o. female who was admitted on 8/23/2017  8:38 PM. Report included the following information SBAR, Kardex and MAR. Opportunity for questions and clarifications were given to the incoming nurse. Patient's bed is in low position, side rails x2, door open PRN, call bell within reach and patient not in distress.     Kelsy Mckeon RN

## 2017-08-27 NOTE — PROGRESS NOTES
Problem: Falls - Risk of  Goal: *Absence of Falls  Document Kyrie Fall Risk and appropriate interventions in the flowsheet.    Outcome: Progressing Towards Goal  Fall Risk Interventions:              Medication Interventions: Patient to call before getting OOB     Elimination Interventions: Call light in reach, Patient to call for help with toileting needs     History of Falls Interventions: Bed/chair exit alarm

## 2017-08-28 ENCOUNTER — PATIENT OUTREACH (OUTPATIENT)
Dept: FAMILY MEDICINE CLINIC | Age: 82
End: 2017-08-28

## 2017-08-28 NOTE — PROGRESS NOTES
[address] Nola Cerrato Discharge Follow-Up      Date/Time:  2017 10:43 AM    Patient listed on discharge [facility]) report on 17. Patient discharged from [address] [place] [address] for Sepsis   CAP. RRAT score: 14   Moderate     Medical Record Reviewed    Medical History:     Past Medical History:   Diagnosis Date    Allergic rhinitis     Diabetes ([address]     Hypercholesterolemia     Hypertension     Thyroid disease        Nurse Navigator(NN) contacted the patient by telephone to perform post hospital discharge assessment. Verified  and address with patient as identifiers. Provided introduction to self, and explanation of the Nurses Navigator role. Patient sounded congested over the phone with a moist cough between sentences. Ms. Syed Zazueta claims to feel better overall, but is anxiously awaiting the arrival of her new Nebulizer today. She admits that the nebulized treatments make her feel better. She claims that her Daughters have filled all of her prescriptions received from her hospital MD. She agreed to a follow up appointment with her PCP. Diet:   Patient reports: Diabetic Diet    Activity:    Patient reports: mostly moving around the house    Medication:   Performed medication reconciliation with patient, and patient verbalizes understanding of administration of home medications. There were no barriers to obtaining medications identified at this time. Support system:  patient and daughter    Discharge Instructions :  Reviewed discharge instructions with patient. Patient verbalizes understanding of discharge instructions and follow-up care.        Red Flags:  Increased SOB/MELTON  Chest tightness and Increased effort of Breathing  Sputum color changes to tan, green/yellow, blood tinged    Labs Reviewed:  Recent Labs      17   0201  17   0412   WBC  8.5  9.4   HGB  11.0*  11.9   HCT  33.6*  36.9   PLT  150  108*     Recent Labs      17   0201  17   0412   NA  141 139   K  3.7  5.0   CL  108  109*   CO2  28  22   GLU  140*  111*   BUN  11  12   CREA  0.87  0.81   CA  8.0*  8.1*     No components found for: GLPOC  No results for input(s): PH, PCO2, PO2, HCO3, FIO2 in the last 72 hours. No results for input(s): INR in the last 72 hours. No lab exists for component: INREXT    Imaging results reviewed:        PCP/Specialist follow up: Patient scheduled to follow up with Aye Barrios MD on 8/29/17 at 1:15pm.  Reviewed red flags with patient, and patient verbalizes understanding. Patient given an opportunity to ask questions. No other clinical/social/functional needs noted. The patient agrees to contact the PCP office for questions related to their healthcare. The patient expressed thanks, offered no additional questions and ended the call. Case management plan: Attempt to contact the patient by telephone or during office visit within the next 7-10 days. Will continue to follow as necessary for the next 30 days. Will reassess for case management needs prior to discharge from case management service on or about 30 days.

## 2017-08-29 ENCOUNTER — OFFICE VISIT (OUTPATIENT)
Dept: FAMILY MEDICINE CLINIC | Age: 82
End: 2017-08-29

## 2017-08-29 VITALS
DIASTOLIC BLOOD PRESSURE: 50 MMHG | TEMPERATURE: 97.9 F | SYSTOLIC BLOOD PRESSURE: 94 MMHG | BODY MASS INDEX: 22.47 KG/M2 | WEIGHT: 126.8 LBS | HEIGHT: 63 IN | OXYGEN SATURATION: 95 % | HEART RATE: 74 BPM | RESPIRATION RATE: 16 BRPM

## 2017-08-29 DIAGNOSIS — J18.9 CAP (COMMUNITY ACQUIRED PNEUMONIA): ICD-10-CM

## 2017-08-29 DIAGNOSIS — M81.0 AGE-RELATED OSTEOPOROSIS WITHOUT CURRENT PATHOLOGICAL FRACTURE: Primary | ICD-10-CM

## 2017-08-29 DIAGNOSIS — R06.2 WHEEZING: ICD-10-CM

## 2017-08-29 DIAGNOSIS — R05.9 COUGH: ICD-10-CM

## 2017-08-29 LAB
BACTERIA SPEC CULT: NORMAL
BACTERIA SPEC CULT: NORMAL
SERVICE CMNT-IMP: NORMAL
SERVICE CMNT-IMP: NORMAL

## 2017-08-29 RX ORDER — GUAIFENESIN 100 MG/5ML
LIQUID (ML) ORAL
COMMUNITY
Start: 2016-12-14 | End: 2016-12-14

## 2017-08-29 RX ORDER — ALBUTEROL SULFATE 90 UG/1
1-2 POWDER, METERED RESPIRATORY (INHALATION) EVERY 4 HOURS
Qty: 1 INHALER | Refills: 11 | Status: SHIPPED | OUTPATIENT
Start: 2017-08-29 | End: 2019-04-04 | Stop reason: SDUPTHER

## 2017-08-29 RX ORDER — LORATADINE 10 MG/1
10 TABLET ORAL
COMMUNITY
End: 2021-07-20 | Stop reason: ALTCHOICE

## 2017-08-29 RX ORDER — LEVOTHYROXINE SODIUM 50 UG/1
CAPSULE ORAL
COMMUNITY
Start: 2016-12-14 | End: 2016-12-14

## 2017-08-29 RX ORDER — OMEPRAZOLE 20 MG/1
CAPSULE, DELAYED RELEASE ORAL
COMMUNITY
Start: 2016-12-14 | End: 2016-12-14

## 2017-08-29 RX ORDER — ROSUVASTATIN CALCIUM 20 MG/1
TABLET, COATED ORAL
COMMUNITY
Start: 2016-12-14 | End: 2016-12-14

## 2017-08-29 RX ORDER — CALCIUM CARBONATE/VITAMIN D3 600MG-5MCG
TABLET ORAL
COMMUNITY
Start: 2016-12-14 | End: 2016-12-14

## 2017-08-29 RX ORDER — ALENDRONATE SODIUM 70 MG/1
70 TABLET ORAL
Qty: 4 TAB | Refills: 11 | Status: SHIPPED | OUTPATIENT
Start: 2017-08-29 | End: 2021-07-20 | Stop reason: ALTCHOICE

## 2017-08-29 NOTE — PROGRESS NOTES
Chief Complaint   Patient presents with   [address] [place] [address], 8/23/2017, pneumonia     1. Have you been to the ER, urgent care clinic since your last visit? Hospitalized since your last visit? Yes, [facility], 8/23/2017, pneumonia  2. Have you seen or consulted any other health care providers outside of the [facility] since your last visit? Include any pap smears or colon screening.  No

## 2017-08-29 NOTE — PROGRESS NOTES
CC:  Chief Complaint   Patient presents with   [address] [place] [address], 8/23/2017, pneumonia     Admitted: 8/23/2017  Discharged: 8/27/2017    HISTORY OF PRESENT ILLNESS  Rosemary Higgins is a 80 y.o. female. HPI Comments: 80F with h/o well controlled type II DM, HTN, HLD, Hypothyroidism and Osteoporosis who presents today for follow up after being admitted for onset of CAP. She became septic due to the CAP and required IV abx. She is here today with her daughter and niece. Major concerns are onset of diarrhea after long standing constipation, weight loss due to poor appetite (long term) and general deconditioning. Hospital course  Sepsis secondary to CAP - improved at d/c - Pt had a bad cough with crackles all throughout. Resp Cx grew scant possible staph aureus. BCx had no growth for 4days. UCx NG for 1day. Pt was given albuterol/ipratropium q6h for sx relief. Pt was changed from Vanc/zosyn to po Doxycycline.  - F/u sensitivity   - continue doxycycline for 6 more days for total 10 days. - use xopenex for wheezing  - f/u with PCP      Mild thrombocytopenia in setting of generalized weakness with progressive wt loss - dilutional for PLT and wt loss from poor intake (wt loss over a year). Stable at d/c (150). Pt was worked up for other possible reasons. Pt had no rash or hepatosplenomegaly on exam, no hx of liver disease, no hx of systemic disease like SLE. No fever since abx coverage (less likely viral or atypicals). CT chest with stable benign scattered multiple 4mm nodules in the lungs. The only thing was pt never had colonoscopy before. Nutrition was also consulted with recommendation to eat high protein diet with ensure. - F/u outpatient GI for screening colonoscopy       Constipation 2/2 most likely slow transit. No home meds. Pt was started on daily miralax and prn dulcolax. It was discontinued on d/c as patient was having looser stools.    - F/u with PCP       Type II Diabetes - well controlled  With diet, A1c 5.9 (4/8/2017). Continue as is but with the nutrition modification stated above.       HTN - stable. - Continue Losartan       HLD - , LDL 39, HDL 43,  (4/7/17)  - Continue home rosuvastatin       Hypothyroidism - POA TSH 0.35  - Continue home synthroid      Osteoporosis- calcium was held during hospital stay. May resume at d/c   - f/u with PCP for possible bisphosphonate addition         ROS:  Review of Systems   All other systems reviewed and are negative. OBJECTIVE:  BP 94/50 (BP 1 Location: Left arm, BP Patient Position: Sitting)  Pulse 74  Temp 97.9 °F (36.6 °C) (Oral)   Resp 16  Ht 5' 3\" (1.6 m)  Wt 126 lb 12.8 oz (57.5 kg)  SpO2 95%  BMI 22.46 kg/m2  Physical Exam   Constitutional:   Well appearing. NAD   Cardiovascular: Normal rate and regular rhythm. Pulmonary/Chest: Effort normal and breath sounds normal. She has no wheezes. Musculoskeletal: Normal range of motion. Neurological: She is alert. Skin: Skin is warm. Psychiatric: She has a normal mood and affect. Her behavior is normal.   Nursing note and vitals reviewed. HM issues:  1. Osteoporosis. She had been on Bisphosphonate's in the past and future prescriptions were not refilled. Will re-oder. 2. Colonoscopy: She flat out declines and at 80years old, very little yield unless crucial   3. Diet: She was given samples of Ensure Enlive. 20G protein, 350 calories. Needs to eat small meals frequently during the day. High protein diet highly recommended. ASSESSMENT and PLAN    ICD-10-CM ICD-9-CM    1. Age-related osteoporosis without current pathological fracture M81.0 733.01 alendronate (FOSAMAX) 70 mg tablet   2. CAP (community acquired pneumonia) J18.9 5 Getting better. Still with intermittent non-productive cough. Will continue to monitor and will    3. Cough R05 961.5 PROAIR RESPICLICK 90 mcg/actuation aepb   4.  Wheezing R06.2 496.48 PROAIR RESPICLICK 90 mcg/actuation aepb     89F who is recovering from recent CAP. She is feeling better and we discussed need to improve her diet. Outlined a plan and she was in agreement. I have discussed the diagnosis with the patient and the intended treatment plan as seen in the above orders. The patient has received an after-visit summary and questions were answered concerning future plans. Asked to return should symptoms worsen or not improve with treatment. Any pending labs and studies will be relayed to patient when they become available. Pt verbalizes understanding of plan of care and denies further questions or concerns at this time. Follow-up Disposition:  Return in about 2 weeks (around 9/12/2017), or if symptoms worsen or fail to improve.

## 2017-08-29 NOTE — MR AVS SNAPSHOT
Visit Information Date & Time Provider Department Dept. Phone Encounter #  
 8/29/2017  1:15 PM Raiza LovelaceRichy [phone] [id number] Follow-up Instructions Return in about 2 weeks (around 9/12/2017), or if symptoms worsen or fail to improve. Your Appointments 9/29/2017 10:00 AM  
Medicare Physical with Raiza Lovelace MD  
[address] [facility]) Appt Note: [facility] [address] [facility]67 [facility]  
  
   
 [facility] [address] Upcoming Health Maintenance Date Due  
 EYE EXAM RETINAL OR DILATED Q1 10/3/2014 GLAUCOMA SCREENING Q2Y 10/3/2015 FOOT EXAM Q1 2/26/2017 INFLUENZA AGE 9 TO ADULT 8/1/2017 MEDICARE YEARLY EXAM 9/28/2017 HEMOGLOBIN A1C Q6M 10/7/2017 MICROALBUMIN Q1 4/7/2018 LIPID PANEL Q1 4/7/2018 DTaP/Tdap/Td series (2 - Td) 4/5/2027 Allergies as of 8/29/2017  Review Complete On: 8/29/2017 By: Raiza Lovelace MD  
  
 Severity Noted Reaction Type Reactions Advil Pm  [Ibuprofen-diphenhydramine Cit] Medium 09/27/2016 Other reaction(s): Adverse reaction to substance , Hives Codeine  07/14/2009    Other (comments) Causes stomach pains Current Immunizations  Reviewed on 4/5/2017 Name Date H1N1 FLU VACCINE 2/11/2010 Influenza High Dose Vaccine PF 9/27/2016 11:30 AM  
 Influenza Vaccine 9/30/2014, 9/28/2013 Influenza Vaccine Split 10/2/2012  2:22 PM, 11/19/2009 Influenza Vaccine Whole 12/20/2010 Pneumococcal Conjugate (PCV-13) 4/9/2015 11:15 AM  
 Td, Adsorbed PF 4/9/2015 11:17 AM  
 ZZZ-RETIRED (DO NOT USE) Pneumococcal Vaccine (Unspecified Type) 4/8/2008 Zoster Vaccine, Live 4/13/2015 Not reviewed this visit You Were Diagnosed With   
  
 Codes Comments Age-related osteoporosis without current pathological fracture    -  Primary ICD-10-CM: M81.0 ICD-9-CM: 733.01   
 CAP (community acquired pneumonia)     ICD-10-CM: J18.9 ICD-9-CM: 152 Cough     ICD-10-CM: R05 ICD-9-CM: 786.2 Wheezing     ICD-10-CM: R06.2 ICD-9-CM: 786.07 Vitals BP Pulse Temp Resp Height(growth percentile) Weight(growth percentile) 94/50 (BP 1 Location: Left arm, BP Patient Position: Sitting) 74 97.9 °F (36.6 °C) (Oral) 16 5' 3\" (1.6 m) 126 lb 12.8 oz (57.5 kg) SpO2 BMI OB Status Smoking Status 95% 22.46 kg/m2 Postmenopausal Never Smoker BMI and BSA Data Body Mass Index Body Surface Area  
 22.46 kg/m 2 1.6 m 2 Preferred Pharmacy Pharmacy Name Phone [id number] [address] [facility], [address] [place] [address]  [phone] Your Updated Medication List  
  
   
This list is accurate as of: 8/29/17  2:02 PM.  Always use your most recent med list.  
  
  
  
  
 alendronate 70 mg tablet Commonly known as:  FOSAMAX Take 1 Tab by mouth every seven (7) days. aspirin delayed-release 81 mg tablet  
take 81 mg by mouth daily. Blood-Glucose Meter monitoring kit ONE TOUCH GLUCOMETER. CALCIUM 500 500 mg calcium (1,250 mg) tablet Generic drug:  calcium carbonate Take 1,250 mg by mouth daily. cetirizine 10 mg tablet Commonly known as:  ZYRTEC Take 10 mg by mouth daily. doxycycline 100 mg tablet Commonly known as:  VIBRA-TABS Take 1 Tab by mouth every twelve (12) hours for 6 days. glucose blood VI test strips strip Commonly known as:  ONETOUCH ULTRA TEST  
TEST ONCE A DAY/Dx Code: E11.9 Lancets Misc TEST ONCE A DAY/Dx Code: E11.9  
  
 levalbuterol 1.25 mg/3 mL Nebu Commonly known as:  XOPENEX  
3 mL by Nebulization route every six (6) hours as needed for up to 30 days. levothyroxine 50 mcg tablet Commonly known as:  SYNTHROID  
TAKE 1 TABLET BY MOUTH EVERY DAY BEFORE BREAKFAST  
  
 loratadine 10 mg tablet Commonly known as:  Amanda Nett Take 10 mg by mouth. losartan 50 mg tablet Commonly known as:  COZAAR  
TAKE 1 TABLET BY MOUTH EVERY DAY  
  
 MUCINEX -30 mg per tablet Generic drug:  guaiFENesin-dextromethorphan SR Take 1 Tab by mouth two (2) times a day. omeprazole 20 mg capsule Commonly known as:  PRILOSEC  
TAKE 2 CAPSULES BY MOUTH EVERY DAY  
  
 PROAIR [address] 90 mcg/actuation Aepb Generic drug:  albuterol sulfate Take 1-2 Puffs by inhalation every four (4) hours. PROTEIN NUTRITIONAL SHAKE PO Take 1 Bottle by mouth daily. rosuvastatin 20 mg tablet Commonly known as:  CRESTOR  
TAKE 1 TABLET BY MOUTH EVERY DAY  
  
 VITAMIN D3 1,000 unit tablet Generic drug:  cholecalciferol Take 1,000 Units by mouth daily. Prescriptions Sent to Pharmacy Refills  
 alendronate (FOSAMAX) 70 mg tablet 11 Sig: Take 1 Tab by mouth every seven (7) days. Class: Normal  
 Pharmacy: [facility] #[id number] K[address] Ph #: [phone] Route: Oral  
 PROAIR RESPICLICK 90 mcg/actuation aepb 11 Sig: Take 1-2 Puffs by inhalation every four (4) hours. Class: Normal  
 Pharmacy: [facility]/pharmacy #[id number] Kirti [address] Ph #: [phone] Route: Inhalation Follow-up Instructions Return in about 2 weeks (around 9/12/2017), or if symptoms worsen or fail to improve. Patient Instructions Nutrition for Older Adults: Care Instructions Your Care Instructions Good nutrition is important at any age. But it is especially important for older adults. Eating a healthy diet helps keep your body strong. And it can help lower your risk for disease. As you get older, your nutrition needs change. Your body needs more of certain nutrients. These include vitamin B12, calcium, and vitamin D. But it may be harder for you to get these and other important nutrients. This could be for many reasons. You may not feel as hungry as you used to.  Or you could have problems with your teeth or mouth that make it hard to chew. Or you may not enjoy planning and preparing meals, especially if you live alone. Now that you need to get all your nutrients from less food, it is important to plan what you eat. The suggestions below can help you get the nutrition you need. If you still need help, talk with your doctor. He or she may recommend that you work with a dietitian. A dietitian can help you plan meals. Follow-up care is a key part of your treatment and safety. Be sure to make and go to all appointments, and call your doctor if you are having problems. It's also a good idea to know your test results and keep a list of the medicines you take. How can you care for yourself at home? To stay healthy · Eat a variety of foods. The more you vary the foods you eat, the more vitamins, minerals, and other nutrients you get. · Take a multivitamin every day. Choose one with about 100% of the daily value (DV) for vitamins and minerals. Do not take more than 100% of the daily value for any vitamin or mineral unless your doctor tells you to. Talk with your doctor if you are not sure which multivitamin is right for you. · Eat lots of fruits and vegetables. Fresh, frozen, or no-salt canned vegetables and fruits in their own juice or light syrup are good choices. · Include foods that are high in vitamin B12 in your diet. Good choices are fortified breakfast cereal, nonfat or low-fat milk and other dairy products, meat, poultry, fish, and eggs. · Get enough calcium and vitamin D. Good choices include nonfat or low-fat milk, cheese, and yogurt. Other good options are tofu, orange juice with added calcium, and some leafy green vegetables, such as erma greens and kale. If you don't use milk products, talk to your doctor about calcium and vitamin D supplements. · Eat protein foods every day.  Good choices include lean meat, fish, poultry, eggs, and cheese. Other good options are cooked beans, peanut butter, and nuts and seeds. · Choose whole grains for half of the grains you eat. Look for 100% whole wheat bread, whole-grain cereals, brown rice, and other whole grains. If you have constipation · Eat high-fiber foods every day. These include fruits, vegetables, cooked dried beans, and whole grains. · Drink plenty of fluids, enough so that your urine is light yellow or clear like water. If you have kidney, heart, or liver disease and have to limit fluids, talk with your doctor before you increase the amount of fluids you drink. · Ask your doctor if stool softeners may help keep your bowels regular. If you have mouth problems that make chewing hard · Pick canned or cooked fruits and vegetables. These are often softer. · Chop or shred meat, poultry, and fish. Add sauce or gravy to the meat to help keep it moist. 
· Pick other protein foods that are soft. These include cheese, peanut butter, cooked beans, cottage cheese, and eggs. If you have trouble shopping for yourself · Ask a local food store to deliver groceries to your home. · Contact a volunteer center and ask for help. · Ask a family member or neighbor to help you. If you have trouble preparing meals · If you are able, take a cooking class. · Use a microwave oven to cook TV dinners and other frozen or prepared foods. · Take part in group meal programs. You can find these through senior citizen programs. · Have meals brought to your home. Your community may offer programs that deliver meals, such as Meals on Wheels. If your appetite is poor · Try eating smaller amounts of food more often. For example, eat 4 or 5 small meals a day instead of 1 or 2 large meals. · Eat with family and friends. Or take part in group meal programs offered through volunteer programs. Eating with others may help your appetite.  And it helps you be more social. 
 · Ask your doctor if your medicines could cause appetite or taste problems. If so, ask about changing medicines. · Add spices and herbs to increase the flavor of food. · If you think you are depressed, ask your doctor for help. Depression can affect your appetite. And it can make it hard to do everyday activities like grocery shopping and making meals. Treatment can help. When should you call for help? Watch closely for changes in your health, and be sure to contact your doctor if: 
· You would like help planning meals. Where can you learn more? Go to http://alix-conrad.info/. Enter V127 in the search box to learn more about \"Nutrition for Older Adults: Care Instructions. \" Current as of: July 26, 2016 Content Version: 11.3 © 8612-0826 [employer]. Care instructions adapted under license by [employer] (which disclaims liability or warranty for this information). If you have questions about a medical condition or this instruction, always ask your healthcare professional. [address] any warranty or liability for your use of this information. Introducing \[facility] [place] [facility]\"" & HEALTH SERVICES! Mackenzie Vargas introduces [facility] patient portal. Now you can access parts of your medical record, email your doctor's office, and request medication refills online. 1. In your internet browser, go to [email or web address] 2. Click on the First Time User? Click Here link in the Sign In box. You will see the New Member Sign Up page. 3. Enter your [facility] Access Code exactly as it appears below. You will not need to use this code after youve completed the sign-up process. If you do not sign up before the expiration date, you must request a new code. · [facility] Access Code: [email or web address] Expires: 11/25/2017  2:51 PM 
 
4.  Enter the last four digits of your Social Security Number (xxxx) and Date of Birth (mm/dd/yyyy) as indicated and click Submit. You will be taken to the next sign-up page. 5. Create a DeckDAQ ID. This will be your DeckDAQ login ID and cannot be changed, so think of one that is secure and easy to remember. 6. Create a DeckDAQ password. You can change your password at any time. 7. Enter your Password Reset Question and Answer. This can be used at a later time if you forget your password. 8. Enter your e-mail address. You will receive e-mail notification when new information is available in [address] 9. Click Sign Up. You can now view and download portions of your medical record. 10. Click the Download Summary menu link to download a portable copy of your medical information. If you have questions, please visit the Frequently Asked Questions section of the DeckDAQ website. Remember, DeckDAQ is NOT to be used for urgent needs. For medical emergencies, dial 911. Now available from your iPhone and Android! Please provide this summary of care documentation to your next provider. Your primary care clinician is listed as [address]. If you have any questions after today's visit, please call [phone].

## 2017-08-29 NOTE — PATIENT INSTRUCTIONS
Nutrition for Older Adults: Care Instructions  Your Care Instructions  Good nutrition is important at any age. But it is especially important for older adults. Eating a healthy diet helps keep your body strong. And it can help lower your risk for disease. As you get older, your nutrition needs change. Your body needs more of certain nutrients. These include vitamin B12, calcium, and vitamin D. But it may be harder for you to get these and other important nutrients. This could be for many reasons. You may not feel as hungry as you used to. Or you could have problems with your teeth or mouth that make it hard to chew. Or you may not enjoy planning and preparing meals, especially if you live alone. Now that you need to get all your nutrients from less food, it is important to plan what you eat. The suggestions below can help you get the nutrition you need. If you still need help, talk with your doctor. He or she may recommend that you work with a dietitian. A dietitian can help you plan meals. Follow-up care is a key part of your treatment and safety. Be sure to make and go to all appointments, and call your doctor if you are having problems. It's also a good idea to know your test results and keep a list of the medicines you take. How can you care for yourself at home? To stay healthy  · Eat a variety of foods. The more you vary the foods you eat, the more vitamins, minerals, and other nutrients you get. · Take a multivitamin every day. Choose one with about 100% of the daily value (DV) for vitamins and minerals. Do not take more than 100% of the daily value for any vitamin or mineral unless your doctor tells you to. Talk with your doctor if you are not sure which multivitamin is right for you. · Eat lots of fruits and vegetables. Fresh, frozen, or no-salt canned vegetables and fruits in their own juice or light syrup are good choices. · Include foods that are high in vitamin B12 in your diet.  Good choices are fortified breakfast cereal, nonfat or low-fat milk and other dairy products, meat, poultry, fish, and eggs. · Get enough calcium and vitamin D. Good choices include nonfat or low-fat milk, cheese, and yogurt. Other good options are tofu, orange juice with added calcium, and some leafy green vegetables, such as erma greens and kale. If you don't use milk products, talk to your doctor about calcium and vitamin D supplements. · Eat protein foods every day. Good choices include lean meat, fish, poultry, eggs, and cheese. Other good options are cooked beans, peanut butter, and nuts and seeds. · Choose whole grains for half of the grains you eat. Look for 100% whole wheat bread, whole-grain cereals, brown rice, and other whole grains. If you have constipation  · Eat high-fiber foods every day. These include fruits, vegetables, cooked dried beans, and whole grains. · Drink plenty of fluids, enough so that your urine is light yellow or clear like water. If you have kidney, heart, or liver disease and have to limit fluids, talk with your doctor before you increase the amount of fluids you drink. · Ask your doctor if stool softeners may help keep your bowels regular. If you have mouth problems that make chewing hard  · Pick canned or cooked fruits and vegetables. These are often softer. · Chop or shred meat, poultry, and fish. Add sauce or gravy to the meat to help keep it moist.  · Pick other protein foods that are soft. These include cheese, peanut butter, cooked beans, cottage cheese, and eggs. If you have trouble shopping for yourself  · Ask a local food store to deliver groceries to your home. · Contact a volunteer center and ask for help. · Ask a family member or neighbor to help you. If you have trouble preparing meals  · If you are able, take a cooking class. · Use a microwave oven to cook TV dinners and other frozen or prepared foods. · Take part in group meal programs.  You can find these through senior citizen programs. · Have meals brought to your home. Your community may offer programs that deliver meals, such as Meals on Wheels. If your appetite is poor  · Try eating smaller amounts of food more often. For example, eat 4 or 5 small meals a day instead of 1 or 2 large meals. · Eat with family and friends. Or take part in group meal programs offered through volunteer programs. Eating with others may help your appetite. And it helps you be more social.  · Ask your doctor if your medicines could cause appetite or taste problems. If so, ask about changing medicines. · Add spices and herbs to increase the flavor of food. · If you think you are depressed, ask your doctor for help. Depression can affect your appetite. And it can make it hard to do everyday activities like grocery shopping and making meals. Treatment can help. When should you call for help? Watch closely for changes in your health, and be sure to contact your doctor if:  · You would like help planning meals. Where can you learn more? Go to http://alix-conrad.info/. Enter B454 in the search box to learn more about \"Nutrition for Older Adults: Care Instructions. \"  Current as of: July 26, 2016  Content Version: 11.3  © 1645-4551 [employer]IS-BOX.[employer], [employer]. Care instructions adapted under license by [employer] (which disclaims liability or warranty for this information). If you have questions about a medical condition or this instruction, always ask your healthcare professional. [place] [address] any warranty or liability for your use of this information.

## 2017-09-12 ENCOUNTER — TELEPHONE (OUTPATIENT)
Dept: FAMILY MEDICINE CLINIC | Age: 82
End: 2017-09-12

## 2017-09-12 ENCOUNTER — HOSPITAL ENCOUNTER (OUTPATIENT)
Dept: LAB | Age: 82
Discharge: HOME | End: 2017-09-12
Payer: MEDICARE

## 2017-09-12 ENCOUNTER — OFFICE VISIT (OUTPATIENT)
Dept: FAMILY MEDICINE CLINIC | Age: 82
End: 2017-09-12

## 2017-09-12 VITALS
HEIGHT: 63 IN | SYSTOLIC BLOOD PRESSURE: 129 MMHG | DIASTOLIC BLOOD PRESSURE: 64 MMHG | BODY MASS INDEX: 21.02 KG/M2 | HEART RATE: 86 BPM | TEMPERATURE: 98.2 F | RESPIRATION RATE: 17 BRPM | WEIGHT: 118.6 LBS | OXYGEN SATURATION: 96 %

## 2017-09-12 DIAGNOSIS — M25.511 CHRONIC RIGHT SHOULDER PAIN: ICD-10-CM

## 2017-09-12 DIAGNOSIS — E78.00 PURE HYPERCHOLESTEROLEMIA: ICD-10-CM

## 2017-09-12 DIAGNOSIS — E02 SUBCLINICAL IODINE-DEFICIENCY HYPOTHYROIDISM: ICD-10-CM

## 2017-09-12 DIAGNOSIS — J18.9 CAP (COMMUNITY ACQUIRED PNEUMONIA): Primary | ICD-10-CM

## 2017-09-12 DIAGNOSIS — Z23 ENCOUNTER FOR IMMUNIZATION: ICD-10-CM

## 2017-09-12 DIAGNOSIS — E11.9 TYPE 2 DIABETES MELLITUS WITHOUT COMPLICATION, WITHOUT LONG-TERM CURRENT USE OF INSULIN (HCC): ICD-10-CM

## 2017-09-12 DIAGNOSIS — E55.9 VITAMIN D DEFICIENCY: ICD-10-CM

## 2017-09-12 DIAGNOSIS — G89.29 CHRONIC RIGHT SHOULDER PAIN: ICD-10-CM

## 2017-09-12 PROCEDURE — 82306 VITAMIN D 25 HYDROXY: CPT

## 2017-09-12 PROCEDURE — 86141 C-REACTIVE PROTEIN HS: CPT

## 2017-09-12 PROCEDURE — 80053 COMPREHEN METABOLIC PANEL: CPT

## 2017-09-12 PROCEDURE — 84443 ASSAY THYROID STIM HORMONE: CPT

## 2017-09-12 PROCEDURE — 85025 COMPLETE CBC W/AUTO DIFF WBC: CPT

## 2017-09-12 PROCEDURE — 83036 HEMOGLOBIN GLYCOSYLATED A1C: CPT

## 2017-09-12 PROCEDURE — 36415 COLL VENOUS BLD VENIPUNCTURE: CPT

## 2017-09-12 PROCEDURE — 80061 LIPID PANEL: CPT

## 2017-09-12 RX ORDER — TIZANIDINE 2 MG/1
TABLET ORAL
Qty: 15 TAB | Refills: 0 | Status: SHIPPED | OUTPATIENT
Start: 2017-09-12 | End: 2017-10-10 | Stop reason: SDUPTHER

## 2017-09-12 RX ORDER — NAPROXEN 250 MG/1
250 TABLET ORAL 2 TIMES DAILY WITH MEALS
Qty: 60 TAB | Refills: 1 | Status: SHIPPED | OUTPATIENT
Start: 2017-09-12 | End: 2017-11-14 | Stop reason: SDUPTHER

## 2017-09-12 NOTE — PROGRESS NOTES
Chief Complaint   Patient presents with    Wheezing     pt stated that she has been feeling better she do still cough at times    Cough     1. Have you been to the ER, urgent care clinic since your last visit? Hospitalized since your last visit? No    2. Have you seen or consulted any other health care providers outside of the [address] [place] [address] since your last visit? Include any pap smears or colon screening.  No

## 2017-09-12 NOTE — MR AVS SNAPSHOT
Visit Information Date & Time Provider Department Dept. Phone Encounter #  
 9/12/2017 10:00 AM Richy Lee Khurram [phone] Your Appointments 9/29/2017 10:00 AM  
Medicare Physical with Dev Thrasher MD  
[address]) Appt Note: medicare wellness visit Margaret Oliva [address] [place] [address]67 [facility]  
  
   
 Margaret [address] Upcoming Health Maintenance Date Due  
 EYE EXAM RETINAL OR DILATED Q1 10/3/2014 GLAUCOMA SCREENING Q2Y 10/3/2015 HEMOGLOBIN A1C Q6M 10/7/2017 MEDICARE YEARLY EXAM 9/28/2017 MICROALBUMIN Q1 4/7/2018 LIPID PANEL Q1 4/7/2018 FOOT EXAM Q1 9/12/2018 DTaP/Tdap/Td series (2 - Td) 4/5/2027 Allergies as of 9/12/2017  Review Complete On: 9/12/2017 By: [place] Severity Noted Reaction Type Reactions Advil Pm  [Ibuprofen-diphenhydramine Cit] Medium 09/27/2016 Other reaction(s): Adverse reaction to substance , Hives Codeine  07/14/2009    Other (comments) Causes stomach pains Current Immunizations  Reviewed on 4/5/2017 Name Date H1N1 FLU VACCINE 2/11/2010 Influenza High Dose Vaccine PF  Incomplete, 9/27/2016 11:30 AM  
 Influenza Vaccine 9/30/2014, 9/28/2013 Influenza Vaccine Split 10/2/2012  2:22 PM, 11/19/2009 Influenza Vaccine Whole 12/20/2010 Pneumococcal Conjugate (PCV-13) 4/9/2015 11:15 AM  
 Td, Adsorbed PF 4/9/2015 11:17 AM  
 ZZZ-RETIRED (DO NOT USE) Pneumococcal Vaccine (Unspecified Type) 4/8/2008 Zoster Vaccine, Live 4/13/2015 Not reviewed this visit You Were Diagnosed With   
  
 Codes Comments CAP (community acquired pneumonia)    -  Primary ICD-10-CM: J18.9 ICD-9-CM: 208 Encounter for immunization     ICD-10-CM: R44 ICD-9-CM: V03.89 Chronic right shoulder pain     ICD-10-CM: M25.511, G89.29 ICD-9-CM: 719.41, 338.29   
 Type 2 diabetes mellitus without complication, without long-term current use of insulin (HCC)     ICD-10-CM: E11.9 ICD-9-CM: 250.00 Pure hypercholesterolemia     ICD-10-CM: E78.00 ICD-9-CM: 272.0 Subclinical iodine-deficiency hypothyroidism     ICD-10-CM: E02 ICD-9-CM: 244.8 Vitamin D deficiency     ICD-10-CM: E55.9 ICD-9-CM: 268.9 Vitals BP Pulse Temp Resp Height(growth percentile) Weight(growth percentile) 129/64 (BP 1 Location: Right arm, BP Patient Position: Sitting) 86 98.2 °F (36.8 °C) (Oral) 17 5' 3\" (1.6 m) 118 lb 9.6 oz (53.8 kg) SpO2 BMI OB Status Smoking Status 96% 21.01 kg/m2 Postmenopausal Never Smoker BMI and BSA Data Body Mass Index Body Surface Area 21.01 kg/m 2 1.55 m 2 Preferred Pharmacy Pharmacy Name Phone [id number] [address] [facility], [address] [place] [address]  [phone] Your Updated Medication List  
  
   
This list is accurate as of: 9/12/17 10:51 AM.  Always use your most recent med list.  
  
  
  
  
 alendronate 70 mg tablet Commonly known as:  FOSAMAX Take 1 Tab by mouth every seven (7) days. aspirin delayed-release 81 mg tablet  
take 81 mg by mouth daily. Blood-Glucose Meter monitoring kit ONE TOUCH GLUCOMETER. CALCIUM 500 500 mg calcium (1,250 mg) tablet Generic drug:  calcium carbonate Take 1,250 mg by mouth daily. cetirizine 10 mg tablet Commonly known as:  ZYRTEC Take 10 mg by mouth daily. glucose blood VI test strips strip Commonly known as:  ONETOUCH ULTRA TEST  
TEST ONCE A DAY/Dx Code: E11.9 Lancets Misc TEST ONCE A DAY/Dx Code: E11.9  
  
 levalbuterol 1.25 mg/3 mL Nebu Commonly known as:  XOPENEX  
3 mL by Nebulization route every six (6) hours as needed for up to 30 days. levothyroxine 50 mcg tablet Commonly known as:  SYNTHROID  
TAKE 1 TABLET BY MOUTH EVERY DAY BEFORE BREAKFAST  
  
 loratadine 10 mg tablet Commonly known as:  Amanda Nett Take 10 mg by mouth.  
  
 losartan 50 mg tablet Commonly known as:  COZAAR  
TAKE 1 TABLET BY MOUTH EVERY DAY  
  
 MUCINEX -30 mg per tablet Generic drug:  guaiFENesin-dextromethorphan SR Take 1 Tab by mouth two (2) times a day. omeprazole 20 mg capsule Commonly known as:  PRILOSEC  
TAKE 2 CAPSULES BY MOUTH EVERY DAY  
  
 PROApeniMED [address] [facility] 90 mcg/actuation Aepb Generic drug:  albuterol sulfate Take 1-2 Puffs by inhalation every four (4) hours. PROTEIN NUTRITIONAL SHAKE PO Take 1 Bottle by mouth daily. rosuvastatin 20 mg tablet Commonly known as:  CRESTOR  
TAKE 1 TABLET BY MOUTH EVERY DAY  
  
 VITAMIN D3 1,000 unit tablet Generic drug:  cholecalciferol Take 1,000 Units by mouth daily. We Performed the Following ADMIN INFLUENZA VIRUS VAC [ HCPCS] CBC WITH AUTOMATED DIFF [55514 CPT(R)] CRP, HIGH SENSITIVITY [60903 CPT(R)] HEMOGLOBIN A1C WITH EAG [82941 CPT(R)] INFLUENZA VIRUS VACCINE, HIGH DOSE SEASONAL, PRESERVATIVE FREE [21496 CPT(R)] LIPID PANEL [46264 CPT(R)] METABOLIC PANEL, COMPREHENSIVE [45109 CPT(R)] THYROID CASCADE PROFILE [[id number] Custom] VITAMIN D, 25 HYDROXY [id number] CPT(R)] To-Do List   
 09/26/2017 Imaging:  XR CHEST PA LAT Patient Instructions Shoulder Arthritis: Exercises Your Care Instructions Here are some examples of typical rehabilitation exercises for your condition. Start each exercise slowly. Ease off the exercise if you start to have pain. Your doctor or physical therapist will tell you when you can start these exercises and which ones will work best for you. How to do the exercises Shoulder flexion (lying down) Note: To make a wand for this exercise, use a piece of PVC pipe or a broom handle with the broom removed. Make the wand about a foot wider than your shoulders. 1. Lie on your back, holding a wand with both hands. Your palms should face down as you hold the wand. 2. Keeping your elbows straight, slowly raise your arms over your head. Raise them until you feel a stretch in your shoulders, upper back, and chest. 
3. Hold for 15 to 30 seconds. 4. Repeat 2 to 4 times. Shoulder rotation (lying down) Note: To make a wand for this exercise, use a piece of PVC pipe or a broom handle with the broom removed. Make the wand about a foot wider than your shoulders. 1. Lie on your back. Hold a wand with both hands with your elbows bent and palms up. 2. Keep your elbows close to your body, and move the wand across your body toward the sore arm. 3. Hold for 8 to 12 seconds. 4. Repeat 2 to 4 times. Shoulder internal rotation with towel 1. Hold a towel above and behind your head with the arm that is not sore. 2. With your sore arm, reach behind your back and grasp the towel. 3. With the arm above your head, pull the towel upward. Do this until you feel a stretch on the front and outside of your sore shoulder. 4. Hold 15 to 30 seconds. 5. Repeat 2 to 4 times. Shoulder blade squeeze 1. Stand with your arms at your sides, and squeeze your shoulder blades together. Do not raise your shoulders up as you squeeze. 2. Hold 6 seconds. 3. Repeat 8 to 12 times. Resisted rows Note: For this exercise, you will need elastic exercise material, such as surgical tubing or Thera-Band. 1. Put the band around a solid object at about waist level. (A bedpost will work well.) Each hand should hold an end of the band. 2. With your elbows at your sides and bent to 90 degrees, pull the band back. Your shoulder blades should move toward each other. Return to the starting position. 3. Repeat 8 to 12 times. External rotator strengthening exercise 1. Start by tying a piece of elastic exercise material to a doorknob.  You can use surgical tubing or Thera-Band. (You may also hold one end of the band in each hand.) 2. Stand or sit with your shoulder relaxed and your elbow bent 90 degrees. Your upper arm should rest comfortably against your side. Squeeze a rolled towel between your elbow and your body for comfort. This will help keep your arm at your side. 3. Hold one end of the elastic band with the hand of the painful arm. 4. Start with your forearm across your belly. Slowly rotate the forearm out away from your body. Keep your elbow and upper arm tucked against the towel roll or the side of your body until you begin to feel tightness in your shoulder. Slowly move your arm back to where you started. 5. Repeat 8 to 12 times. Internal rotator strengthening exercise 1. Start by tying a piece of elastic exercise material to a doorknob. You can use surgical tubing or Thera-Band. 2. Stand or sit with your shoulder relaxed and your elbow bent 90 degrees. Your upper arm should rest comfortably against your side. Squeeze a rolled towel between your elbow and your body for comfort. This will help keep your arm at your side. 3. Hold one end of the elastic band in the hand of the painful arm. 4. Slowly rotate your forearm toward your body until it touches your belly. Slowly move it back to where you started. 5. Keep your elbow and upper arm firmly tucked against the towel roll or at your side. 6. Repeat 8 to 12 times. Pendulum swing Note: If you have pain in your back, do not do this exercise. 1. Hold on to a table or the back of a chair with your good arm. Then bend forward a little and let your sore arm hang straight down. This exercise does not use the arm muscles. Rather, use your legs and your hips to create movement that makes your arm swing freely. 2. Use the movement from your hips and legs to guide the slightly swinging arm back and forth like a pendulum (or elephant trunk).  Then guide it in circles that start small (about the size of a dinner plate). Make the circles a bit larger each day, as your pain allows. 3. Do this exercise for 5 minutes, 5 to 7 times each day. 4. As you have less pain, try bending over a little farther to do this exercise. This will increase the amount of movement at your shoulder. Follow-up care is a key part of your treatment and safety. Be sure to make and go to all appointments, and call your doctor if you are having problems. It's also a good idea to know your test results and keep a list of the medicines you take. Where can you learn more? Go to http://alix-conrad.info/. Enter H562 in the search box to learn more about \"Shoulder Arthritis: Exercises. \" Current as of: March 21, 2017 Content Version: 11.3 © 0455-8548 [employer]. Care instructions adapted under license by [employer] (which disclaims liability or warranty for this information). If you have questions about a medical condition or this instruction, always ask your healthcare professional. [place] [address] any warranty or liability for your use of this information. Introducing [address]! Tabitha Mckinley introduces WhenSoon patient portal. Now you can access parts of your medical record, email your doctor's office, and request medication refills online. 1. In your internet browser, go to [email or web address] 2. Click on the First Time User? Click Here link in the Sign In box. You will see the New Member Sign Up page. 3. Enter your WhenSoon Access Code exactly as it appears below. You will not need to use this code after youve completed the sign-up process. If you do not sign up before the expiration date, you must request a new code. · WhenSoon Access Code: [email or web address] Expires: 11/25/2017  2:51 PM 
 
4.  Enter the last four digits of your Social Security Number (xxxx) and Date of Birth (mm/dd/yyyy) as indicated and click Submit. You will be taken to the next sign-up page. 5. Create a [employer] ID. This will be your [employer] login ID and cannot be changed, so think of one that is secure and easy to remember. 6. Create a [employer] password. You can change your password at any time. 7. Enter your Password Reset Question and Answer. This can be used at a later time if you forget your password. 8. Enter your e-mail address. You will receive e-mail notification when new information is available in [address] 9. Click Sign Up. You can now view and download portions of your medical record. 10. Click the Download Summary menu link to download a portable copy of your medical information. If you have questions, please visit the Frequently Asked Questions section of the [employer] website. Remember, [employer] is NOT to be used for urgent needs. For medical emergencies, dial 911. Now available from your iPhone and Android! Please provide this summary of care documentation to your next provider. Your primary care clinician is listed as [address]. If you have any questions after today's visit, please call [phone].

## 2017-09-12 NOTE — PROGRESS NOTES
CC:  Chief Complaint   Patient presents with    Wheezing     pt stated that she has been feeling better she do still cough at times    Cough     HISTORY OF PRESENT ILLNESS  Thea Delgado is a 80 y.o. female. HPI Comments: 80F who presents for follow up of CAP and also needs fasting labs and diabetic foot check today. She reports that she has been feeling better. Cough is lessning and no fevers, chills or sputum production. Still has a decreased appetite. Has lost about 8-lbs since her last visit. She is drinking Ensure inbetween, but not consistently. Wheezing    Associated symptoms include cough. Cough         ROS:  Review of Systems   Respiratory: Positive for cough and wheezing. All other systems reviewed and are negative. OBJECTIVE:  /64 (BP 1 Location: Right arm, BP Patient Position: Sitting)  Pulse 86  Temp 98.2 °F (36.8 °C) (Oral)   Resp 17  Ht 5' 3\" (1.6 m)  Wt 118 lb 9.6 oz (53.8 kg)  SpO2 96%  BMI 21.01 kg/m2  Physical Exam   Constitutional: She is oriented to person, place, and time. HENT:   Head: Normocephalic. Eyes: Pupils are equal, round, and reactive to light. Neck: Normal range of motion. Neck supple. Cardiovascular: Normal rate and regular rhythm. Pulmonary/Chest: Effort normal and breath sounds normal.   Musculoskeletal: Normal range of motion. Neurological: She is alert and oriented to person, place, and time. Skin: Skin is warm. Nursing note and vitals reviewed. ASSESSMENT and PLAN    ICD-10-CM ICD-9-CM    1. CAP (community acquired pneumonia) J18.9 486 XR CHEST PA LAT   2. Encounter for immunization Z23 V03.89 ADMIN INFLUENZA VIRUS VAC      INFLUENZA VIRUS VACCINE, HIGH DOSE SEASONAL, PRESERVATIVE FREE   3. Chronic right shoulder pain M25.511 719.41 naproxen (NAPROSYN) 250 mg tablet    G89.29 338.29 tiZANidine (ZANAFLEX) 2 mg tablet   4.  Type 2 diabetes mellitus without complication, without long-term current use of insulin (HCC) E11.9 250.00 HEMOGLOBIN A1C WITH EAG      METABOLIC PANEL, COMPREHENSIVE      CBC WITH AUTOMATED DIFF   5. Pure hypercholesterolemia E78.00 272.0 LIPID PANEL      CRP, HIGH SENSITIVITY   6. Subclinical iodine-deficiency hypothyroidism E02 244.8 THYROID CASCADE PROFILE   7. Vitamin D deficiency E55.9 268.9 VITAMIN D, 25 HYDROXY     89F who presents for follow up after recent episode of CAP. She is getting better. Needs xray in the next few weeks to ascertain complete resolution. She also needs fasting labs today. In general, doing much better. Working on increasing calorie intake. No worrisome symptoms at this time. I have discussed the diagnosis with the patient and the intended treatment plan as seen in the above orders. The patient has received an after-visit summary and questions were answered concerning future plans. Asked to return should symptoms worsen or not improve with treatment. Any pending labs and studies will be relayed to patient when they become available. Pt verbalizes understanding of plan of care and denies further questions or concerns at this time. Follow-up Disposition:  Return if symptoms worsen or fail to improve.

## 2017-09-12 NOTE — TELEPHONE ENCOUNTER
Pt is due for medicare wellness exam after 09/28/17 and has been advised. She is scheduled in October 2017 and is aware of appt time/date.

## 2017-09-12 NOTE — PATIENT INSTRUCTIONS
Shoulder Arthritis: Exercises  Your Care Instructions  Here are some examples of typical rehabilitation exercises for your condition. Start each exercise slowly. Ease off the exercise if you start to have pain. Your doctor or physical therapist will tell you when you can start these exercises and which ones will work best for you. How to do the exercises  Shoulder flexion (lying down)    Note: To make a wand for this exercise, use a piece of PVC pipe or a broom handle with the broom removed. Make the wand about a foot wider than your shoulders. 1. Lie on your back, holding a wand with both hands. Your palms should face down as you hold the wand. 2. Keeping your elbows straight, slowly raise your arms over your head. Raise them until you feel a stretch in your shoulders, upper back, and chest.  3. Hold for 15 to 30 seconds. 4. Repeat 2 to 4 times. Shoulder rotation (lying down)    Note: To make a wand for this exercise, use a piece of PVC pipe or a broom handle with the broom removed. Make the wand about a foot wider than your shoulders. 1. Lie on your back. Hold a wand with both hands with your elbows bent and palms up. 2. Keep your elbows close to your body, and move the wand across your body toward the sore arm. 3. Hold for 8 to 12 seconds. 4. Repeat 2 to 4 times. Shoulder internal rotation with towel    1. Hold a towel above and behind your head with the arm that is not sore. 2. With your sore arm, reach behind your back and grasp the towel. 3. With the arm above your head, pull the towel upward. Do this until you feel a stretch on the front and outside of your sore shoulder. 4. Hold 15 to 30 seconds. 5. Repeat 2 to 4 times. Shoulder blade squeeze    1. Stand with your arms at your sides, and squeeze your shoulder blades together. Do not raise your shoulders up as you squeeze. 2. Hold 6 seconds. 3. Repeat 8 to 12 times.   Resisted rows    Note: For this exercise, you will need elastic exercise material, such as surgical tubing or Thera-Band. 1. Put the band around a solid object at about waist level. (A bedpost will work well.) Each hand should hold an end of the band. 2. With your elbows at your sides and bent to 90 degrees, pull the band back. Your shoulder blades should move toward each other. Return to the starting position. 3. Repeat 8 to 12 times. External rotator strengthening exercise    1. Start by tying a piece of elastic exercise material to a doorknob. You can use surgical tubing or Thera-Band. (You may also hold one end of the band in each hand.)  2. Stand or sit with your shoulder relaxed and your elbow bent 90 degrees. Your upper arm should rest comfortably against your side. Squeeze a rolled towel between your elbow and your body for comfort. This will help keep your arm at your side. 3. Hold one end of the elastic band with the hand of the painful arm. 4. Start with your forearm across your belly. Slowly rotate the forearm out away from your body. Keep your elbow and upper arm tucked against the towel roll or the side of your body until you begin to feel tightness in your shoulder. Slowly move your arm back to where you started. 5. Repeat 8 to 12 times. Internal rotator strengthening exercise    1. Start by tying a piece of elastic exercise material to a doorknob. You can use surgical tubing or Thera-Band. 2. Stand or sit with your shoulder relaxed and your elbow bent 90 degrees. Your upper arm should rest comfortably against your side. Squeeze a rolled towel between your elbow and your body for comfort. This will help keep your arm at your side. 3. Hold one end of the elastic band in the hand of the painful arm. 4. Slowly rotate your forearm toward your body until it touches your belly. Slowly move it back to where you started. 5. Keep your elbow and upper arm firmly tucked against the towel roll or at your side. 6. Repeat 8 to 12 times.   Pendulum swing    Note: If you have pain in your back, do not do this exercise. 1. Hold on to a table or the back of a chair with your good arm. Then bend forward a little and let your sore arm hang straight down. This exercise does not use the arm muscles. Rather, use your legs and your hips to create movement that makes your arm swing freely. 2. Use the movement from your hips and legs to guide the slightly swinging arm back and forth like a pendulum (or elephant trunk). Then guide it in circles that start small (about the size of a dinner plate). Make the circles a bit larger each day, as your pain allows. 3. Do this exercise for 5 minutes, 5 to 7 times each day. 4. As you have less pain, try bending over a little farther to do this exercise. This will increase the amount of movement at your shoulder. Follow-up care is a key part of your treatment and safety. Be sure to make and go to all appointments, and call your doctor if you are having problems. It's also a good idea to know your test results and keep a list of the medicines you take. Where can you learn more? Go to http://alix-conrad.info/. Enter H562 in the search box to learn more about \"Shoulder Arthritis: Exercises. \"  Current as of: March 21, 2017  Content Version: 11.3  © 6856-5927 [employer]. Care instructions adapted under license by [employer] (which disclaims liability or warranty for this information). If you have questions about a medical condition or this instruction, always ask your healthcare professional. [address] any warranty or liability for your use of this information.

## 2017-09-12 NOTE — TELEPHONE ENCOUNTER
Has pt done her medicare wellness for this year because pt stated to cancel the appt coming it to have it done please call and advise pt

## 2017-09-13 LAB
25(OH)D3+25(OH)D2 SERPL-MCNC: 44.5 NG/ML (ref 30–100)
ALBUMIN SERPL-MCNC: 3.8 G/DL (ref 3.2–4.6)
ALBUMIN/GLOB SERPL: 1.4 {RATIO} (ref 1.2–2.2)
ALP SERPL-CCNC: 71 IU/L (ref 39–117)
ALT SERPL-CCNC: 23 IU/L (ref 0–32)
AST SERPL-CCNC: 34 IU/L (ref 0–40)
BASOPHILS # BLD AUTO: 0.1 X10E3/UL (ref 0–0.2)
BASOPHILS NFR BLD AUTO: 1 %
BILIRUB SERPL-MCNC: 0.3 MG/DL (ref 0–1.2)
BUN SERPL-MCNC: 11 MG/DL (ref 10–36)
BUN/CREAT SERPL: 12 (ref 12–28)
CALCIUM SERPL-MCNC: 9 MG/DL (ref 8.7–10.3)
CHLORIDE SERPL-SCNC: 104 MMOL/L (ref 96–106)
CHOLEST SERPL-MCNC: 95 MG/DL (ref 100–199)
CO2 SERPL-SCNC: 25 MMOL/L (ref 18–29)
CREAT SERPL-MCNC: 0.91 MG/DL (ref 0.57–1)
CRP SERPL HS-MCNC: 1.01 MG/L (ref 0–3)
EOSINOPHIL # BLD AUTO: 0.1 X10E3/UL (ref 0–0.4)
EOSINOPHIL NFR BLD AUTO: 2 %
ERYTHROCYTE [DISTWIDTH] IN BLOOD BY AUTOMATED COUNT: 14.1 % (ref 12.3–15.4)
EST. AVERAGE GLUCOSE BLD GHB EST-MCNC: 114 MG/DL
GLOBULIN SER CALC-MCNC: 2.8 G/DL (ref 1.5–4.5)
GLUCOSE SERPL-MCNC: 94 MG/DL (ref 65–99)
HBA1C MFR BLD: 5.6 % (ref 4.8–5.6)
HCT VFR BLD AUTO: 41.7 % (ref 34–46.6)
HDLC SERPL-MCNC: 44 MG/DL
HGB BLD-MCNC: 13.2 G/DL (ref 11.1–15.9)
IMM GRANULOCYTES # BLD: 0 X10E3/UL (ref 0–0.1)
IMM GRANULOCYTES NFR BLD: 0 %
INTERPRETATION, 910389: NORMAL
INTERPRETATION: NORMAL
LDLC SERPL CALC-MCNC: 23 MG/DL (ref 0–99)
LYMPHOCYTES # BLD AUTO: 1.8 X10E3/UL (ref 0.7–3.1)
LYMPHOCYTES NFR BLD AUTO: 22 %
Lab: NORMAL
MCH RBC QN AUTO: 30.6 PG (ref 26.6–33)
MCHC RBC AUTO-ENTMCNC: 31.7 G/DL (ref 31.5–35.7)
MCV RBC AUTO: 97 FL (ref 79–97)
MONOCYTES # BLD AUTO: 0.6 X10E3/UL (ref 0.1–0.9)
MONOCYTES NFR BLD AUTO: 7 %
NEUTROPHILS # BLD AUTO: 5.6 X10E3/UL (ref 1.4–7)
NEUTROPHILS NFR BLD AUTO: 68 %
PDF IMAGE, 910387: NORMAL
PLATELET # BLD AUTO: 376 X10E3/UL (ref 150–379)
POTASSIUM SERPL-SCNC: 4.7 MMOL/L (ref 3.5–5.2)
PROT SERPL-MCNC: 6.6 G/DL (ref 6–8.5)
RBC # BLD AUTO: 4.31 X10E6/UL (ref 3.77–5.28)
SODIUM SERPL-SCNC: 143 MMOL/L (ref 134–144)
TRIGL SERPL-MCNC: 141 MG/DL (ref 0–149)
TSH SERPL DL<=0.005 MIU/L-ACNC: 0.62 UIU/ML (ref 0.45–4.5)
VLDLC SERPL CALC-MCNC: 28 MG/DL (ref 5–40)
WBC # BLD AUTO: 8.2 X10E3/UL (ref 3.4–10.8)

## 2017-10-05 ENCOUNTER — HOSPITAL ENCOUNTER (OUTPATIENT)
Dept: GENERAL RADIOLOGY | Age: 82
Discharge: HOME | End: 2017-10-05
Attending: INTERNAL MEDICINE
Payer: MEDICARE

## 2017-10-05 DIAGNOSIS — J18.9 CAP (COMMUNITY ACQUIRED PNEUMONIA): ICD-10-CM

## 2017-10-05 PROCEDURE — 71020 XR CHEST PA LAT: CPT

## 2017-10-10 ENCOUNTER — OFFICE VISIT (OUTPATIENT)
Dept: FAMILY MEDICINE CLINIC | Age: 82
End: 2017-10-10

## 2017-10-10 VITALS
WEIGHT: 119 LBS | OXYGEN SATURATION: 97 % | BODY MASS INDEX: 21.09 KG/M2 | HEIGHT: 63 IN | HEART RATE: 67 BPM | RESPIRATION RATE: 16 BRPM | TEMPERATURE: 96.1 F | SYSTOLIC BLOOD PRESSURE: 148 MMHG | DIASTOLIC BLOOD PRESSURE: 67 MMHG

## 2017-10-10 DIAGNOSIS — Z13.39 SCREENING FOR ALCOHOLISM: ICD-10-CM

## 2017-10-10 DIAGNOSIS — G89.29 CHRONIC RIGHT SHOULDER PAIN: Primary | ICD-10-CM

## 2017-10-10 DIAGNOSIS — M25.511 CHRONIC RIGHT SHOULDER PAIN: Primary | ICD-10-CM

## 2017-10-10 DIAGNOSIS — Z23 ENCOUNTER FOR IMMUNIZATION: ICD-10-CM

## 2017-10-10 RX ORDER — TIZANIDINE 2 MG/1
TABLET ORAL
Qty: 15 TAB | Refills: 0 | Status: SHIPPED | OUTPATIENT
Start: 2017-10-10 | End: 2017-10-31 | Stop reason: SDUPTHER

## 2017-10-10 NOTE — PROGRESS NOTES
Identified pt with two pt identifiers(name and ). Chief Complaint   Patient presents with    Annual Wellness Visit     last done 2017 [facility] Other     requested we remove diabetic testing supplies as she has needed them in approx 2 years        Health Maintenance Due   Topic    EYE EXAM RETINAL OR DILATED Q1     GLAUCOMA SCREENING Q2Y     MEDICARE YEARLY EXAM        Wt Readings from Last 3 Encounters:   10/10/17 119 lb (54 kg)   17 118 lb 9.6 oz (53.8 kg)   17 126 lb 12.8 oz (57.5 kg)     Temp Readings from Last 3 Encounters:   10/10/17 96.1 °F (35.6 °C) (Oral)   17 98.2 °F (36.8 °C) (Oral)   17 97.9 °F (36.6 °C) (Oral)     BP Readings from Last 3 Encounters:   10/10/17 148/67   17 129/64   17 94/50     Pulse Readings from Last 3 Encounters:   10/10/17 67   17 86   17 74         Learning Assessment:  :     Learning Assessment 2014   PRIMARY LEARNER Patient   HIGHEST LEVEL OF EDUCATION - PRIMARY LEARNER  DID NOT GRADUATE HIGH SCHOOL   BARRIERS PRIMARY LEARNER NONE   CO-LEARNER CAREGIVER No   PRIMARY LANGUAGE ENGLISH   LEARNER PREFERENCE PRIMARY LISTENING   ANSWERED BY patient    RELATIONSHIP SELF       Depression Screening:  :     PHQ over the last two weeks 2017   Little interest or pleasure in doing things Not at all   Feeling down, depressed or hopeless Not at all   Total Score PHQ 2 0       Fall Risk Assessment:  :     Fall Risk Assessment, last 12 mths 2017   Able to walk? Yes   Fall in past 12 months? No   Fall with injury? -   Number of falls in past 12 months -   Fall Risk Score -       Abuse Screening:  :     Abuse Screening Questionnaire 2017   Do you ever feel afraid of your partner? N N   Are you in a relationship with someone who physically or mentally threatens you? N N   Is it safe for you to go home?  Y Y       Coordination of Care Questionnaire:  :     1) Have you been to an emergency room, urgent care clinic since your last visit? no   Hospitalized since your last visit? no             2) Have you seen or consulted any other health care providers outside of [address] [place] [address] since your last visit? no  (Include any pap smears or colon screenings in this section.)    3) Do you have an Advance Directive on file? no  Are you interested in receiving information about Advance Directives? no    Patient is accompanied by daughters. I have received verbal consent from Britney Sandoval to discuss any/all medical information while they are present in the room. Reviewed record in preparation for visit and have obtained necessary documentation. Medication reconciliation up to date and corrected with patient at this time. Functional Ability:   Does the patient exhibit a steady gait? yes   How long did it take the patient to get up and walk from a sitting position? seconds   Is the patient self reliant?  (ie can do own laundry, meals, household chores)  yes     Does the patient handle his/her own medications? yes     Does the patient handle his/her own money? yes     Is the patients home safe (ie good lighting, handrails on stairs and bath, etc.)? No, she does not have handrails in her bathroom     Did you notice or did patient express any hearing difficulties? Yes, she wears hearing aids     Did you notice or did patient express any vision difficulties? Yes, she wears glasses     Were distance and reading eye charts used? no       Advance Care Planning:   Patient was offered the opportunity to discuss advance care planning:  yes     Does patient have an Advance Directive:  yes   If no, did you provide information on Caring Connections?   no

## 2017-10-10 NOTE — MR AVS SNAPSHOT
Visit Information Date & Time Provider Department Dept. Phone Encounter #  
 10/10/2017 10:30 AM Aida [facility]ba[facility] [phone] [id number] Follow-up Instructions Return in about 5 months (around 3/10/2018), or if symptoms worsen or fail to improve, for Chronic medical care. Follow-up and Disposition History Your Appointments 1/11/2018 11:00 AM  
ROUTINE CARE with Aida Barba MD  
[address] [place] [address]) Appt Note: [facility] [address] [facility] 860 1067 [facility]  
  
   
 [facility] [address] Upcoming Health Maintenance Date Due  
 EYE EXAM RETINAL OR DILATED Q1 10/3/2014 GLAUCOMA SCREENING Q2Y 10/3/2015 HEMOGLOBIN A1C Q6M 3/12/2018 MICROALBUMIN Q1 4/7/2018 FOOT EXAM Q1 9/12/2018 LIPID PANEL Q1 9/12/2018 MEDICARE YEARLY EXAM 10/11/2018 DTaP/Tdap/Td series (2 - Td) 4/5/2027 Allergies as of 10/10/2017  Review Complete On: 10/10/2017 By: Aida Barba MD  
  
 Severity Noted Reaction Type Reactions Advil Pm  [Ibuprofen-diphenhydramine Cit] Medium 09/27/2016 Other reaction(s): Adverse reaction to substance , Hives Codeine  07/14/2009    Other (comments) Causes stomach pains Current Immunizations  Reviewed on 10/10/2017 Name Date H1N1 FLU VACCINE 2/11/2010 Influenza High Dose Vaccine PF 10/10/2017 12:23 PM, 9/27/2016 11:30 AM  
 Influenza Vaccine 9/30/2014, 9/28/2013 Influenza Vaccine Split 10/2/2012  2:22 PM, 11/19/2009 Influenza Vaccine Whole 12/20/2010 Pneumococcal Conjugate (PCV-13) 4/9/2015 11:15 AM  
 Td, Adsorbed PF 4/9/2015 11:17 AM  
 ZZZ-RETIRED (DO NOT USE) Pneumococcal Vaccine (Unspecified Type) 4/8/2008 Zoster Vaccine, Live 4/13/2015 Reviewed by Meg Flores LPN on 48/16/8669 at 12:23 PM  
You Were Diagnosed With   
  
 Codes Comments Chronic right shoulder pain    -  Primary ICD-10-CM: M25.511, G89.29 ICD-9-CM: 719.41, 338.29 Encounter for immunization     ICD-10-CM: P23 ICD-9-CM: V03.89 Screening for alcoholism     ICD-10-CM: Z13.89 ICD-9-CM: V79.1 Vitals BP Pulse Temp Resp Height(growth percentile) Weight(growth percentile) 148/67 (BP 1 Location: Right arm, BP Patient Position: Sitting) 67 96.1 °F (35.6 °C) (Oral) 16 5' 3\" (1.6 m) 119 lb (54 kg) SpO2 BMI OB Status Smoking Status 97% 21.08 kg/m2 Postmenopausal Never Smoker Vitals History BMI and BSA Data Body Mass Index Body Surface Area 21.08 kg/m 2 1.55 m 2 Preferred Pharmacy Pharmacy Name Phone [id number] [address] , [address] [place] [address]  [phone] Your Updated Medication List  
  
   
This list is accurate as of: 10/10/17 11:59 PM.  Always use your most recent med list.  
  
  
  
  
 alendronate 70 mg tablet Commonly known as:  FOSAMAX Take 1 Tab by mouth every seven (7) days. aspirin delayed-release 81 mg tablet  
take 81 mg by mouth daily. CALCIUM 500 500 mg calcium (1,250 mg) tablet Generic drug:  calcium carbonate Take 1,250 mg by mouth daily. levothyroxine 50 mcg tablet Commonly known as:  SYNTHROID  
TAKE 1 TABLET BY MOUTH EVERY DAY BEFORE BREAKFAST  
  
 loratadine 10 mg tablet Commonly known as:  Michaell Organ Take 10 mg by mouth.  
  
 losartan 50 mg tablet Commonly known as:  COZAAR  
TAKE 1 TABLET BY MOUTH EVERY DAY  
  
 naproxen 250 mg tablet Commonly known as:  NAPROSYN Take 1 Tab by mouth two (2) times daily (with meals) for 30 days. omeprazole 20 mg capsule Commonly known as:  PRILOSEC  
TAKE 2 CAPSULES BY MOUTH EVERY DAY  
  
 PROGateRocket [place] 90 mcg/actuation Aepb Generic drug:  albuterol sulfate Take 1-2 Puffs by inhalation every four (4) hours. PROTEIN NUTRITIONAL SHAKE PO Take 1 Bottle by mouth daily. rosuvastatin 20 mg tablet Commonly known as:  CRESTOR  
TAKE 1 TABLET BY MOUTH EVERY DAY  
  
 tiZANidine 2 mg tablet Commonly known as:  Garcia Mins Take 1 tablet at bedtime as needed for muscle spasm. VITAMIN D3 1,000 unit tablet Generic drug:  cholecalciferol Take 1,000 Units by mouth daily. Prescriptions Sent to Pharmacy Refills  
 tiZANidine (ZANAFLEX) 2 mg tablet (Discontinued) 0 Sig: Take 1 tablet at bedtime as needed for muscle spasm. Class: Normal  
 Pharmacy: [id number]- [address], [address] [place] [address] Dr Ph #: [phone] Reason for Discontinue: Reorder We Performed the Following ADMIN INFLUENZA VIRUS VAC [ [facility] [place] INFLUENZA VIRUS VACCINE, HIGH DOSE SEASONAL, PRESERVATIVE FREE [87217 CPT(R)] [place] ANNUAL ALCOHOL SCREEN 15 MIN [id number] [facility] [place]] Follow-up Instructions Return in about 5 months (around 3/10/2018), or if symptoms worsen or fail to improve, for Chronic medical care. Patient Instructions Medicare Wellness Visit, Female The best way to live healthy is to have a healthy lifestyle by eating a well-balanced diet, exercising regularly, limiting alcohol and stopping smoking. Regular physical exams and screening tests are another way to keep healthy. Preventive exams provided by your health care provider can find health problems before they become diseases or illnesses. Preventive services including immunizations, screening tests, monitoring and exams can help you take care of your own health. All people over age 72 should have a pneumovax  and and a prevnar shot to prevent pneumonia. These are once in a lifetime unless you and your provider decide differently. All people over 65 should have a yearly flu shot and a tetanus vaccine every 10 years. A bone mass density to screen for osteoporosis or thinning of the bones should be done every 2 years after 65. Screening for diabetes mellitus with a blood sugar test should be done every year. Glaucoma is a disease of the eye due to increased ocular pressure that can lead to blindness and it should be done every year by an eye professional. 
 
Cardiovascular screening tests that check for elevated lipids (fatty part of blood) which can lead to heart disease and strokes should be done every 5 years. Colorectal screening that evaluates for blood or polyps in your colon should be done yearly as a stool test or every five years as a flexible sigmoidoscope or every 10 years as a colonoscopy up to age 76. Breast cancer screening with a mammogram is recommended biennially  for women age 54-69. Screening for cervical cancer with a pap smear and pelvic exam is recommended for women after age 72 years every 2 years up to age 79 or when the provider and patient decide to stop. If there is a history of cervical abnormalities or other increased risk for cancer then the test is recommended yearly. Hepatitis C screening is also recommended for anyone born between [birth year] through [address]. A shingles vaccine is also recommended once in a lifetime after age 61. Your Medicare Wellness Exam is recommended annually. Here is a list of your current Health Maintenance items with a due date: 
Health Maintenance Due Topic Date Due [facility] Eye Exam  10/03/2014  Glaucoma Screening   10/03/2015 [facility] Annual Well Visit  09/28/2017 Well Visit, Over 72: Care Instructions Your Care Instructions Physical exams can help you stay healthy. Your doctor has checked your overall health and may have suggested ways to take good care of yourself. He or she also may have recommended tests. At home, you can help prevent illness with healthy eating, regular exercise, and other steps. Follow-up care is a key part of your treatment and safety.  Be sure to make and go to all appointments, and call your doctor if you are having problems. It's also a good idea to know your test results and keep a list of the medicines you take. How can you care for yourself at home? · Reach and stay at a healthy weight. This will lower your risk for many problems, such as obesity, diabetes, heart disease, and high blood pressure. · Get at least 30 minutes of exercise on most days of the week. Walking is a good choice. You also may want to do other activities, such as running, swimming, cycling, or playing tennis or team sports. · Do not smoke. Smoking can make health problems worse. If you need help quitting, talk to your doctor about stop-smoking programs and medicines. These can increase your chances of quitting for good. · Protect your skin from too much sun. When you're outdoors from 10 a.m. to 4 p.m., stay in the shade or cover up with clothing and a hat with a wide brim. Wear sunglasses that block UV rays. Even when it's cloudy, put broad-spectrum sunscreen (SPF 30 or higher) on any exposed skin. · See a dentist one or two times a year for checkups and to have your teeth cleaned. · Wear a seat belt in the car. · Limit alcohol to 2 drinks a day for men and 1 drink a day for women. Too much alcohol can cause health problems. Follow your doctor's advice about when to have certain tests. These tests can spot problems early. For men and women · Cholesterol. Your doctor will tell you how often to have this done based on your overall health and other things that can increase your risk for heart attack and stroke. · Blood pressure. Have your blood pressure checked during a routine doctor visit. Your doctor will tell you how often to check your blood pressure based on your age, your blood pressure results, and other factors. · Diabetes. Ask your doctor whether you should have tests for diabetes. · Vision. Experts recommend that you have yearly exams for glaucoma and other age-related eye problems. · Hearing. Tell your doctor if you notice any change in your hearing. You can have tests to find out how well you hear. · Colon cancer tests. Keep having colon cancer tests as your doctor recommends. You can have one of several types of tests. · Heart attack and stroke risk. At least every 4 to 6 years, you should have your risk for heart attack and stroke assessed. Your doctor uses factors such as your age, blood pressure, cholesterol, and whether you smoke or have diabetes to show what your risk for a heart attack or stroke is over the next 10 years. · Osteoporosis. Talk to your doctor about whether you should have a bone density test to find out whether you have thinning bones. Also ask your doctor about whether you should take calcium and vitamin D supplements. For women · Pap test and pelvic exam. You may no longer need a Pap test. Talk with your doctor about whether to stop or continue to have Pap tests. · Breast exam and mammogram. Ask how often you should have a mammogram, which is an X-ray of your breasts. A mammogram can spot breast cancer before it can be felt and when it is easiest to treat. · Thyroid disease. Talk to your doctor about whether to have your thyroid checked as part of a regular physical exam. Women have an increased chance of a thyroid problem. For men · Prostate exam. Talk to your doctor about whether you should have a blood test (called a PSA test) for prostate cancer. Experts disagree on whether men should have this test. Some experts recommend that you discuss the benefits and risks of the test with your doctor. · Abdominal aortic aneurysm. Ask your doctor whether you should have a test to check for an aneurysm. You may need a test if you ever smoked or if your parent, brother, sister, or child has had an aneurysm. When should you call for help?  
Watch closely for changes in your health, and be sure to contact your doctor if you have any problems or symptoms that concern you. Where can you learn more? Go to http://alix-conrad.info/. Enter Y961 in the search box to learn more about \"Well Visit, Over 65: Care Instructions. \" Current as of: July 19, 2016 Content Version: 11.3 © 0171-0662 [employer]. Care instructions adapted under license by [employer] (which disclaims liability or warranty for this information). If you have questions about a medical condition or this instruction, always ask your healthcare professional. [address] any warranty or liability for your use of this information. Patient Instructions History Introducing [facility]! Jacey Thornton introduces SwingPal patient portal. Now you can access parts of your medical record, email your doctor's office, and request medication refills online. 1. In your internet browser, go to [email or web address] 2. Click on the First Time User? Click Here link in the Sign In box. You will see the New Member Sign Up page. 3. Enter your SwingPal Access Code exactly as it appears below. You will not need to use this code after youve completed the sign-up process. If you do not sign up before the expiration date, you must request a new code. · SwingPal Access Code: [email or web address] Expires: 4/10/2018  4:51 PM 
 
4. Enter the last four digits of your Social Security Number (xxxx) and Date of Birth (mm/dd/yyyy) as indicated and click Submit. You will be taken to the next sign-up page. 5. Create a [employer] ID. This will be your SwingPal login ID and cannot be changed, so think of one that is secure and easy to remember. 6. Create a SwingPal password. You can change your password at any time. 7. Enter your Password Reset Question and Answer. This can be used at a later time if you forget your password. 8. Enter your e-mail address. You will receive e-mail notification when new information is available in [address] 9. Click Sign Up. You can now view and download portions of your medical record. 10. Click the Download Summary menu link to download a portable copy of your medical information. If you have questions, please visit the Frequently Asked Questions section of the One[employer]Fan website. Remember, OneTrueFan is NOT to be used for urgent needs. For medical emergencies, dial 911. Now available from your iPhone and Android! Please provide this summary of care documentation to your next provider. Your primary care clinician is listed as [address]. If you have any questions after today's visit, please call [phone].

## 2017-10-10 NOTE — PROGRESS NOTES
This is a Subsequent Medicare Annual Wellness Exam (AWV) (Performed 12 months after IPPE or effective date of Medicare Part B enrollment, Once in a lifetime)    I have reviewed the patient's medical history in detail and updated the computerized patient record. History     Past Medical History:   Diagnosis Date    Allergic rhinitis     Diabetes ([address]     Hypercholesterolemia     Hypertension     Thyroid disease       Past Surgical History:   Procedure Laterality Date    BREAST SURGERY PROCEDURE UNLISTED      HX CHOLECYSTECTOMY       Current Outpatient Prescriptions   Medication Sig Dispense Refill    tiZANidine (ZANAFLEX) 2 mg tablet Take 1 tablet at bedtime as needed for muscle spasm. 15 Tab 0    naproxen (NAPROSYN) 250 mg tablet Take 1 Tab by mouth two (2) times daily (with meals) for 30 days. 60 Tab 1    loratadine (CLARITIN) 10 mg tablet Take 10 mg by mouth.  alendronate (FOSAMAX) 70 mg tablet Take 1 Tab by mouth every seven (7) days. 4 Tab 11    PROAIR RESPICLICK 90 mcg/actuation aepb Take 1-2 Puffs by inhalation every four (4) hours. 1 Inhaler 11    rosuvastatin (CRESTOR) 20 mg tablet TAKE 1 TABLET BY MOUTH EVERY DAY 90 Tab 1    omeprazole (PRILOSEC) 20 mg capsule TAKE 2 CAPSULES BY MOUTH EVERY DAY 90 Cap 1    levothyroxine (SYNTHROID) 50 mcg tablet TAKE 1 TABLET BY MOUTH EVERY DAY BEFORE BREAKFAST 90 Tab 1    losartan (COZAAR) 50 mg tablet TAKE 1 TABLET BY MOUTH EVERY DAY 90 Tab 1    LACTOSE-REDUCED FOOD (PROTEIN NUTRITIONAL SHAKE PO) Take 1 Bottle by mouth daily.  cholecalciferol, vitamin d3, (VITAMIN D) 1,000 unit tablet Take 1,000 Units by mouth daily.  calcium carbonate (CALCIUM 500) 500 mg (1,250 mg) tablet Take 1,250 mg by mouth daily.  aspirin delayed-release 81 mg tablet take 81 mg by mouth daily. Allergies   Allergen Reactions    Advil Pm  [Ibuprofen-Diphenhydramine Cit]      Other reaction(s):  Adverse reaction to substance , Hives    Codeine Other (comments)     Causes stomach pains     Family History   Problem Relation Age of Onset    Heart Disease Mother     Heart Disease Father     Diabetes Paternal Grandfather      Social History   Substance Use Topics    Smoking status: Never Smoker    Smokeless tobacco: Never Used    Alcohol use No     Patient Active Problem List   Diagnosis Code    Type 2 diabetes mellitus without complication, without long-term current use of insulin (HCC) E11.9    Essential hypertension, benign I10    Hyperlipidemia E78.5    Hypothyroidism E03.9    Allergic rhinitis, cause unspecified J30.9    Chronic back pain M54.9, G89.29    Osteoporosis M81.0    Fall at home [address] Crissie Pel, Y92.099    Right shoulder pain M25.511    Facial bruising S00.83XA    CAP (community acquired pneumonia) J18.9    Sepsis ([address] A41.9       Depression Risk Factor Screening:     PHQ over the last two weeks 9/12/2017   Little interest or pleasure in doing things Not at all   Feeling down, depressed or hopeless Not at all   Total Score PHQ 2 0     Alcohol Risk Factor Screening: You do not drink alcohol or very rarely. Functional Ability and Level of Safety:   Hearing Loss  The patient wears hearing aids. Activities of Daily Living  The home contains: no safety equipment. Patient does total self care    Fall RiskFall Risk Assessment, last 12 mths 9/12/2017   Able to walk? Yes   Fall in past 12 months?  No   Fall with injury? -   Number of falls in past 12 months -   Fall Risk Score -       Abuse Screen  Patient is not abused    Cognitive Screening   Evaluation of Cognitive Function:  Has your family/caregiver stated any concerns about your memory: no  Normal    Patient Care Team   Patient Care Team:  Wilbur Gilliland MD as PCP - General  Steve Jones RN as Ambulatory Care Navigator    Assessment/Plan   Education and counseling provided:  Are appropriate based on today's review and evaluation  End-of-Life planning (with patient's consent)  Pneumococcal Vaccine  Influenza Vaccine  Screening Mammography  Bone mass measurement (DEXA)  Screening for glaucoma    Diagnoses and all orders for this visit:    1. Chronic right shoulder pain  -     tiZANidine (ZANAFLEX) 2 mg tablet; Take 1 tablet at bedtime as needed for muscle spasm. 2. Encounter for immunization  -     [address], HIGH DOSE SEASONAL, PRESERVATIVE FREE        Health Maintenance Due   Topic Date Due    EYE EXAM RETINAL OR DILATED Q1  10/03/2014    GLAUCOMA SCREENING Q2Y  10/03/2015    MEDICARE YEARLY EXAM  09/28/2017     I have discussed the diagnosis with the patient and the intended treatment plan as seen in the above orders. The patient has received an after-visit summary and questions were answered concerning future plans. Asked to return should symptoms worsen or not improve with treatment. Any pending labs and studies will be relayed to patient when they become available. Pt verbalizes understanding of plan of care and denies further questions or concerns at this time. Follow-up Disposition:  Return in about 5 months (around 3/10/2018), or if symptoms worsen or fail to improve, for Chronic medical care.

## 2017-10-10 NOTE — PATIENT INSTRUCTIONS
Medicare Wellness Visit, Female    The best way to live healthy is to have a healthy lifestyle by eating a well-balanced diet, exercising regularly, limiting alcohol and stopping smoking. Regular physical exams and screening tests are another way to keep healthy. Preventive exams provided by your health care provider can find health problems before they become diseases or illnesses. Preventive services including immunizations, screening tests, monitoring and exams can help you take care of your own health. All people over age 72 should have a pneumovax  and and a prevnar shot to prevent pneumonia. These are once in a lifetime unless you and your provider decide differently. All people over 65 should have a yearly flu shot and a tetanus vaccine every 10 years. A bone mass density to screen for osteoporosis or thinning of the bones should be done every 2 years after 65. Screening for diabetes mellitus with a blood sugar test should be done every year. Glaucoma is a disease of the eye due to increased ocular pressure that can lead to blindness and it should be done every year by an eye professional.    Cardiovascular screening tests that check for elevated lipids (fatty part of blood) which can lead to heart disease and strokes should be done every 5 years. Colorectal screening that evaluates for blood or polyps in your colon should be done yearly as a stool test or every five years as a flexible sigmoidoscope or every 10 years as a colonoscopy up to age 76. Breast cancer screening with a mammogram is recommended biennially  for women age 54-69. Screening for cervical cancer with a pap smear and pelvic exam is recommended for women after age 72 years every 2 years up to age 79 or when the provider and patient decide to stop. If there is a history of cervical abnormalities or other increased risk for cancer then the test is recommended yearly.     Hepatitis C screening is also recommended for anyone born between [birth year] through [address]. A shingles vaccine is also recommended once in a lifetime after age 61. Your Medicare Wellness Exam is recommended annually. Here is a list of your current Health Maintenance items with a due date:  Health Maintenance Due   Topic Date Due    Eye Exam  10/03/2014    Glaucoma Screening   10/03/2015    Annual Well Visit  09/28/2017          Well Visit, Over 72: Care Instructions  Your Care Instructions  Physical exams can help you stay healthy. Your doctor has checked your overall health and may have suggested ways to take good care of yourself. He or she also may have recommended tests. At home, you can help prevent illness with healthy eating, regular exercise, and other steps. Follow-up care is a key part of your treatment and safety. Be sure to make and go to all appointments, and call your doctor if you are having problems. It's also a good idea to know your test results and keep a list of the medicines you take. How can you care for yourself at home? · Reach and stay at a healthy weight. This will lower your risk for many problems, such as obesity, diabetes, heart disease, and high blood pressure. · Get at least 30 minutes of exercise on most days of the week. Walking is a good choice. You also may want to do other activities, such as running, swimming, cycling, or playing tennis or team sports. · Do not smoke. Smoking can make health problems worse. If you need help quitting, talk to your doctor about stop-smoking programs and medicines. These can increase your chances of quitting for good. · Protect your skin from too much sun. When you're outdoors from 10 a.m. to 4 p.m., stay in the shade or cover up with clothing and a hat with a wide brim. Wear sunglasses that block UV rays. Even when it's cloudy, put broad-spectrum sunscreen (SPF 30 or higher) on any exposed skin.   · See a dentist one or two times a year for checkups and to have your teeth cleaned. · Wear a seat belt in the car. · Limit alcohol to 2 drinks a day for men and 1 drink a day for women. Too much alcohol can cause health problems. Follow your doctor's advice about when to have certain tests. These tests can spot problems early. For men and women  · Cholesterol. Your doctor will tell you how often to have this done based on your overall health and other things that can increase your risk for heart attack and stroke. · Blood pressure. Have your blood pressure checked during a routine doctor visit. Your doctor will tell you how often to check your blood pressure based on your age, your blood pressure results, and other factors. · Diabetes. Ask your doctor whether you should have tests for diabetes. · Vision. Experts recommend that you have yearly exams for glaucoma and other age-related eye problems. · Hearing. Tell your doctor if you notice any change in your hearing. You can have tests to find out how well you hear. · Colon cancer tests. Keep having colon cancer tests as your doctor recommends. You can have one of several types of tests. · Heart attack and stroke risk. At least every 4 to 6 years, you should have your risk for heart attack and stroke assessed. Your doctor uses factors such as your age, blood pressure, cholesterol, and whether you smoke or have diabetes to show what your risk for a heart attack or stroke is over the next 10 years. · Osteoporosis. Talk to your doctor about whether you should have a bone density test to find out whether you have thinning bones. Also ask your doctor about whether you should take calcium and vitamin D supplements. For women  · Pap test and pelvic exam. You may no longer need a Pap test. Talk with your doctor about whether to stop or continue to have Pap tests. · Breast exam and mammogram. Ask how often you should have a mammogram, which is an X-ray of your breasts.  A mammogram can spot breast cancer before it can be felt and when it is easiest to treat. · Thyroid disease. Talk to your doctor about whether to have your thyroid checked as part of a regular physical exam. Women have an increased chance of a thyroid problem. For men  · Prostate exam. Talk to your doctor about whether you should have a blood test (called a PSA test) for prostate cancer. Experts disagree on whether men should have this test. Some experts recommend that you discuss the benefits and risks of the test with your doctor. · Abdominal aortic aneurysm. Ask your doctor whether you should have a test to check for an aneurysm. You may need a test if you ever smoked or if your parent, brother, sister, or child has had an aneurysm. When should you call for help? Watch closely for changes in your health, and be sure to contact your doctor if you have any problems or symptoms that concern you. Where can you learn more? Go to http://alix-conrad.info/. Enter E237 in the search box to learn more about \"Well Visit, Over 65: Care Instructions. \"  Current as of: July 19, 2016  Content Version: 11.3  © 3770-7823 Healthwise, Incorporated. Care instructions adapted under license by [employer] (which disclaims liability or warranty for this information). If you have questions about a medical condition or this instruction, always ask your healthcare professional. [address] any warranty or liability for your use of this information.

## 2017-10-17 RX ORDER — OMEPRAZOLE 20 MG/1
CAPSULE, DELAYED RELEASE ORAL
Qty: 90 CAP | Refills: 1 | Status: SHIPPED | OUTPATIENT
Start: 2017-10-17 | End: 2018-01-13 | Stop reason: SDUPTHER

## 2017-10-31 DIAGNOSIS — G89.29 CHRONIC RIGHT SHOULDER PAIN: ICD-10-CM

## 2017-10-31 DIAGNOSIS — M25.511 CHRONIC RIGHT SHOULDER PAIN: ICD-10-CM

## 2017-11-01 RX ORDER — TIZANIDINE 2 MG/1
TABLET ORAL
Qty: 15 TAB | Refills: 0 | Status: SHIPPED | OUTPATIENT
Start: 2017-11-01 | End: 2017-11-26 | Stop reason: SDUPTHER

## 2017-11-09 RX ORDER — LOSARTAN POTASSIUM 50 MG/1
TABLET ORAL
Qty: 90 TAB | Refills: 1 | Status: SHIPPED | OUTPATIENT
Start: 2017-11-09 | End: 2018-05-05 | Stop reason: SDUPTHER

## 2017-11-14 DIAGNOSIS — G89.29 CHRONIC RIGHT SHOULDER PAIN: ICD-10-CM

## 2017-11-14 DIAGNOSIS — M25.511 CHRONIC RIGHT SHOULDER PAIN: ICD-10-CM

## 2017-11-15 RX ORDER — NAPROXEN 250 MG/1
TABLET ORAL
Qty: 60 TAB | Refills: 1 | Status: SHIPPED | OUTPATIENT
Start: 2017-11-15 | End: 2018-10-17 | Stop reason: SDUPTHER

## 2017-11-26 DIAGNOSIS — M25.511 CHRONIC RIGHT SHOULDER PAIN: ICD-10-CM

## 2017-11-26 DIAGNOSIS — G89.29 CHRONIC RIGHT SHOULDER PAIN: ICD-10-CM

## 2017-11-26 RX ORDER — LEVOTHYROXINE SODIUM 50 UG/1
TABLET ORAL
Qty: 90 TAB | Refills: 1 | Status: SHIPPED | OUTPATIENT
Start: 2017-11-26 | End: 2018-05-10 | Stop reason: SDUPTHER

## 2017-11-27 RX ORDER — TIZANIDINE 2 MG/1
TABLET ORAL
Qty: 15 TAB | Refills: 0 | Status: SHIPPED | OUTPATIENT
Start: 2017-11-27 | End: 2017-12-10 | Stop reason: SDUPTHER

## 2017-12-10 DIAGNOSIS — G89.29 CHRONIC RIGHT SHOULDER PAIN: ICD-10-CM

## 2017-12-10 DIAGNOSIS — M25.511 CHRONIC RIGHT SHOULDER PAIN: ICD-10-CM

## 2017-12-11 RX ORDER — TIZANIDINE 2 MG/1
TABLET ORAL
Qty: 15 TAB | Refills: 0 | Status: SHIPPED | OUTPATIENT
Start: 2017-12-11 | End: 2021-07-20 | Stop reason: ALTCHOICE

## 2018-01-15 RX ORDER — OMEPRAZOLE 20 MG/1
CAPSULE, DELAYED RELEASE ORAL
Qty: 90 CAP | Refills: 1 | Status: SHIPPED | OUTPATIENT
Start: 2018-01-15 | End: 2018-05-07 | Stop reason: SDUPTHER

## 2018-02-07 ENCOUNTER — OFFICE VISIT (OUTPATIENT)
Dept: FAMILY MEDICINE CLINIC | Age: 83
End: 2018-02-07

## 2018-02-07 VITALS
HEIGHT: 63 IN | TEMPERATURE: 97 F | WEIGHT: 130 LBS | OXYGEN SATURATION: 96 % | SYSTOLIC BLOOD PRESSURE: 149 MMHG | RESPIRATION RATE: 16 BRPM | DIASTOLIC BLOOD PRESSURE: 77 MMHG | BODY MASS INDEX: 23.04 KG/M2 | HEART RATE: 74 BPM

## 2018-02-07 DIAGNOSIS — Z01.818 PRE-OP EXAM: ICD-10-CM

## 2018-02-07 DIAGNOSIS — H25.9 SENILE CATARACT OF RIGHT EYE, UNSPECIFIED AGE-RELATED CATARACT TYPE: Primary | ICD-10-CM

## 2018-02-07 PROBLEM — E11.21 TYPE 2 DIABETES MELLITUS WITH NEPHROPATHY (HCC): Status: ACTIVE | Noted: 2018-02-07

## 2018-02-07 RX ORDER — NEPAFENAC 3 MG/ML
SUSPENSION OPHTHALMIC
Refills: 1 | COMMUNITY
Start: 2018-01-12

## 2018-02-07 RX ORDER — DUREZOL 0.5 MG/ML
EMULSION OPHTHALMIC
Refills: 1 | COMMUNITY
Start: 2018-01-15 | End: 2021-07-20 | Stop reason: ALTCHOICE

## 2018-02-07 RX ORDER — BESIFLOXACIN 6 MG/ML
SUSPENSION OPHTHALMIC
Refills: 1 | COMMUNITY
Start: 2018-01-12 | End: 2021-07-20 | Stop reason: ALTCHOICE

## 2018-02-07 NOTE — PROGRESS NOTES
Preoperative Evaluation    Date of Exam: 2018    Roldan Tinajero is a 80 y.o. female (:[id number]) who presents for preoperative evaluation. We have been asked by Dr. Jamey Jones (ophthalmologist) to perform a pre-operative evaluation for Majo Monae who will have catract extraction of the right eye with lens implant using regional anesthesia. She has been healthy and well and no major medical concern. Her PMH is discussed below. Latex Allergy: no    Problem List:     Patient Active Problem List    Diagnosis Date Noted    Type 2 diabetes mellitus with nephropathy ([facility] [address] 2018    CAP (community acquired pneumonia) 2017    Sepsis ([facility] [address] 2017    Fall at home 2015    Right shoulder pain 2015    Facial bruising 2015    Osteoporosis 2012    Chronic back pain 2012    Type 2 diabetes mellitus without complication, without long-term current use of insulin ([facility] [address] 2009    Essential hypertension, benign 2009    Hyperlipidemia 2009    Hypothyroidism 2009    Allergic rhinitis, cause unspecified 2009     Medical History:     Past Medical History:   Diagnosis Date    Allergic rhinitis     Diabetes ([facility] [address]     Hypercholesterolemia     Hypertension     Thyroid disease      Allergies: Allergies   Allergen Reactions    Advil Pm  [Ibuprofen-Diphenhydramine Cit]      Other reaction(s): Adverse reaction to substance , Hives    Codeine Other (comments)     Causes stomach pains      Medications:     Current Outpatient Prescriptions   Medication Sig    tiZANidine (ZANAFLEX) 2 mg tablet TAKE 1 TABLET AT BEDTIME AS NEEDED FOR MUSCLE SPASM.     levothyroxine (SYNTHROID) 50 mcg tablet TAKE 1 TABLET BY MOUTH EVERY DAY BEFORE BREAKFAST    naproxen (NAPROSYN) 250 mg tablet TAKE 1 TABLET BY MOUTH TWICE A DAY WITH MEALS FOR 30 DAYS    losartan (COZAAR) 50 mg tablet TAKE 1 TABLET BY MOUTH EVERY DAY    loratadine (CLARITIN) 10 mg tablet Take 10 mg by mouth.  alendronate (FOSAMAX) 70 mg tablet Take 1 Tab by mouth every seven (7) days.  rosuvastatin (CRESTOR) 20 mg tablet TAKE 1 TABLET BY MOUTH EVERY DAY    LACTOSE-REDUCED FOOD (PROTEIN NUTRITIONAL SHAKE PO) Take 1 Bottle by mouth daily.  cholecalciferol, vitamin d3, (VITAMIN D) 1,000 unit tablet Take 1,000 Units by mouth daily.  calcium carbonate (CALCIUM 500) 500 mg (1,250 mg) tablet Take 1,250 mg by mouth daily.  aspirin delayed-release 81 mg tablet take 81 mg by mouth daily.  DUREZOL 0.05 % ophthalmic emulsion INSTILL 1 DROP IN RIGHT EYE AS DIRECTED    BESIVANCE 0.6 % drps ophthalmic suspension STARTING THE DAY BEFORE SURGERY, INSTILL 1 DROP IN RIGHT EYE TWICE A DAY    ILEVRO 0.3 % drps STARTING DAY BEFORE SURGERY, INSTILL 1 DROP IN RIGHT EYE DAILY    omeprazole (PRILOSEC) 20 mg capsule TAKE 2 CAPSULES BY MOUTH EVERY DAY    PROAIR RESPICLICK 90 mcg/actuation aepb Take 1-2 Puffs by inhalation every four (4) hours. No current facility-administered medications for this visit. Surgical History:     Past Surgical History:   Procedure Laterality Date    BREAST SURGERY PROCEDURE UNLISTED      HX CHOLECYSTECTOMY       Social History:     Social History     Social History    Marital status:      Spouse name: N/A    Number of children: N/A    Years of education: N/A     Social History Main Topics    Smoking status: Never Smoker    Smokeless tobacco: Never Used    Alcohol use No    Drug use: No    Sexual activity: Not Currently     Other Topics Concern    None     Social History Narrative       Anesthesia Complications: None  History of abnormal bleeding : None  History of Blood Transfusions: no  Health Care Directive or Living Will: yes    Objective:     ROS:   Feeling well. No dyspnea or chest pain on exertion. No abdominal pain, change in bowel habits, black or bloody stools. No urinary tract symptoms. No neurological complaints.     OBJECTIVE: The patient appears well, alert, oriented x 3, in no distress. Visit Vitals    /77 (BP 1 Location: Right arm, BP Patient Position: Sitting)    Pulse 74    Temp 97 °F (36.1 °C) (Oral)    Resp 16    Ht 5' 3\" (1.6 m)    Wt 130 lb (59 kg)    SpO2 96%    BMI 23.03 kg/m2     HEENT:ENT normal.  Neck supple. No adenopathy or thyromegaly. SHALA. Chest: Lungs are clear, good air entry, no wheezes, rhonchi or rales. Cardiovascular: S1 and S2 normal, no murmurs, regular rate and rhythm. Abdomen: soft without tenderness, guarding, mass or organomegaly. Extremities: show no edema, normal peripheral pulses. 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 Neurological: is normal, no focal findings. DIAGNOSTICS:   1. EKG: Not indicated  2. CXR: Not indicated  3. Labs: Not indicated    IMPRESSION:   89F with a h/o Type II DM (well controlled), hypothyroid disease (well controlled), HTN (controlled) and Hyperlipidemia who presents for preoperative clearance. She has had a similar operation in 2012 of the L-eye and did very well. Per the [facility] and AHA guidelines, she has a low surgical risk (~0-0.4% risk) of MI or fatal MI during the surgery. She is cleared to proceed with the recommended surgery and anesthesia at this time. Low risk for planned surgery  No contraindications to planned surgery    I have discussed the diagnosis with the patient and the intended treatment plan as seen in the above orders. The patient has received an after-visit summary and questions were answered concerning future plans. Asked to return should symptoms worsen or not improve with treatment. Any pending labs and studies will be relayed to patient when they become available. Pt verbalizes understanding of plan of care and denies further questions or concerns at this time.        Abdirashid Oconnor MD   2/7/2018

## 2018-02-07 NOTE — PATIENT INSTRUCTIONS
Cataract Surgery: Before Your Surgery  What is cataract surgery? Cataracts are cloudy areas in the lens of your eye. Your lens is behind the colored part of your eye (iris). Its job is to focus light onto the back of your eye. In some people, cataracts prevent light from reaching the back of the eye. This can cause vision problems. Cataract surgery helps you see better. It replaces your natural lens, which has become cloudy, with a clear artificial one. There are two types of cataract surgery. Phacoemulsification (say \"nnon-cf-wb-stv-mzi-moz-ALEJANDRO-shun\") is the most common type. The doctor makes a small cut in your eye. This cut is called an incision. The doctor uses a special ultrasound tool to break your cloudy lens apart. Sometimes a laser is used too. Then he or she removes the small pieces of the lens through the incision. In most cases, the doctor then inserts an artificial lens through the incision. Most people do not need stitches, because the incision is so small. If the doctor is not able to put in an artificial lens, you can wear a contact lens or thick glasses in place of your natural lens. Extracapsular extraction is a less common type of cataract surgery. The doctor makes a larger incision to remove the whole lens at once. After the doctor removes the lens, he or she stitches up the incision. Recovery from this type of surgery takes longer. Before either surgery, the doctor puts numbing drops in your eye. Some doctors use a shot instead. You may also get medicine to make you feel relaxed. You probably will not feel much pain. The surgery takes about 20 to 40 minutes. After surgery, you may have a bandage or shield on your eye. You will probably go home from surgery after 1 hour in the recovery room. Most people see better in 1 to 3 days. You may be able to go back to work or your normal routine in a few days.  It could take 3 to 10 weeks for your eye to completely heal. After your eye heals, you may still need to wear glasses, especially for reading. Follow-up care is a key part of your treatment and safety. Be sure to make and go to all appointments, and call your doctor if you are having problems. It's also a good idea to know your test results and keep a list of the medicines you take. What happens before surgery? ?Surgery can be stressful. This information will help you understand what you can expect. And it will help you safely prepare for surgery. ? Preparing for surgery  ? · Understand exactly what surgery is planned, along with the risks, benefits, and other options. · Tell your doctors ALL the medicines, vitamins, supplements, and herbal remedies you take. Some of these can increase the risk of bleeding or interact with anesthesia. ? · If you take blood thinners, such as warfarin (Coumadin), clopidogrel (Plavix), or aspirin, be sure to talk to your doctor. He or she will tell you if you should stop taking these medicines before your surgery. Make sure that you understand exactly what your doctor wants you to do.   ? · Your doctor will tell you which medicines to take or stop before your surgery. You may need to stop taking certain medicines a week or more before surgery. So talk to your doctor as soon as you can.   ? · If you have an advance directive, let your doctor know. It may include a living will and a durable power of  for health care. Bring a copy to the hospital. If you don't have one, you may want to prepare one. It lets your doctor and loved ones know your health care wishes. Doctors advise that everyone prepare these papers before any type of surgery or procedure. What happens on the day of surgery? · Follow the instructions exactly about when to stop eating and drinking. If you don't, your surgery may be canceled. If your doctor told you to take your medicines on the day of surgery, take them with only a sip of water.    ? · Take a bath or shower before you come in for your surgery. Do not apply lotions, perfumes, deodorants, or nail polish. ? · Take off all jewelry and piercings. And take out contact lenses, if you wear them. ? At the hospital or surgery center   · Bring a picture ID. ? · The area for surgery is often marked to make sure there are no errors. ? · You will be kept comfortable and safe by your anesthesia provider. The anesthesia may make you sleep. Or it may just numb the area being worked on. ? · The surgery will take about 20 to 40 minutes. Going home   · Be sure you have someone to drive you home. Anesthesia and pain medicine make it unsafe for you to drive. ? · You will be given more specific instructions about recovering from your surgery. They will cover things like diet, wound care, follow-up care, driving, and getting back to your normal routine. ? · You may have a bandage or patch over your eye. You may also have a clear shield over your eye. This prevents you from rubbing it. When should you call your doctor? · You have questions or concerns. ? · You don't understand how to prepare for your surgery. ? · You become ill before the surgery (such as fever, flu, or a cold). ? · You need to reschedule or have changed your mind about having the surgery. Where can you learn more? Go to http://alix-conrad.info/. Enter K474 in the search box to learn more about \"Cataract Surgery: Before Your Surgery. \"  Current as of: March 3, 2017  Content Version: 11.4  © 2430-9079 Healthwise, Incorporated. Care instructions adapted under license by [employer] (which disclaims liability or warranty for this information). If you have questions about a medical condition or this instruction, always ask your healthcare professional. [address] any warranty or liability for your use of this information.

## 2018-02-07 NOTE — MR AVS SNAPSHOT
[facility] 
 
 
 [facility] [address] [facility] 
[phone] Patient: Sherie Sloan MRN:  [id number] Visit Information Date & Time Provider Department Dept. Phone Encounter #  
 2018 11:00 AM Richy Lemus Khurram [phone] [id number] Follow-up Instructions Return if symptoms worsen or fail to improve. Upcoming Health Maintenance Date Due  
 EYE EXAM RETINAL OR DILATED Q1 10/3/2014 GLAUCOMA SCREENING Q2Y 10/3/2015 HEMOGLOBIN A1C Q6M 3/12/2018 MICROALBUMIN Q1 2018 FOOT EXAM Q1 2018 LIPID PANEL Q1 2018 MEDICARE YEARLY EXAM 10/11/2018 DTaP/Tdap/Td series (2 - Td) 2027 Allergies as of 2018  Review Complete On: 2018 By: Maury Lyn MD  
  
 Severity Noted Reaction Type Reactions Advil Pm  [Ibuprofen-diphenhydramine Cit] Medium 2016 Other reaction(s): Adverse reaction to substance , Hives Codeine  2009    Other (comments) Causes stomach pains Current Immunizations  Reviewed on 10/10/2017 Name Date H1N1 FLU VACCINE 2010 Influenza High Dose Vaccine PF 10/10/2017 12:23 PM, 2016 11:30 AM  
 Influenza Vaccine 2014, 2013 Influenza Vaccine Split 10/2/2012  2:22 PM, 2009 Influenza Vaccine Whole 2010 Pneumococcal Conjugate (PCV-13) 2015 11:15 AM  
 Td, Adsorbed PF 2015 11:17 AM  
 ZZZ-RETIRED (DO NOT USE) Pneumococcal Vaccine (Unspecified Type) 2008 Zoster Vaccine, Live 2015 Not reviewed this visit You Were Diagnosed With   
  
 Codes Comments Senile cataract of right eye, unspecified age-related cataract type    -  Primary ICD-10-CM: H25.9 ICD-9-CM: 366.10 Pre-op exam     ICD-10-CM: A82.890 ICD-9-CM: V72.84 Vitals BP Pulse Temp Resp Height(growth percentile) Weight(growth percentile) 149/77 (BP 1 Location: Right arm, BP Patient Position: Sitting) 74 97 °F (36.1 °C) (Oral) 16 5' 3\" (1.6 m) 130 lb (59 kg) SpO2 BMI OB Status Smoking Status 96% 23.03 kg/m2 Postmenopausal Never Smoker Vitals History BMI and BSA Data Body Mass Index Body Surface Area 23.03 kg/m 2 1.62 m 2 Preferred Pharmacy Pharmacy Name Phone [id number]- [address], [id number] [place] [address]  [phone] Your Updated Medication List  
  
   
This list is accurate as of: 2/7/18 12:22 PM.  Always use your most recent med list.  
  
  
  
  
 alendronate 70 mg tablet Commonly known as:  FOSAMAX Take 1 Tab by mouth every seven (7) days. aspirin delayed-release 81 mg tablet  
take 81 mg by mouth daily. BESIVANCE 0.6 % Drps ophthalmic suspension Generic drug:  besifloxacin STARTING THE DAY BEFORE SURGERY, INSTILL 1 DROP IN RIGHT EYE TWICE A DAY  
  
 CALCIUM 500 500 mg calcium (1,250 mg) tablet Generic drug:  calcium carbonate Take 1,250 mg by mouth daily. DUREZOL 0.05 % ophthalmic emulsion Generic drug:  Difluprednate INSTILL 1 DROP IN RIGHT EYE AS DIRECTED ILEVRO 0.3 % Drps Generic drug:  nepafenac STARTING DAY BEFORE SURGERY, INSTILL 1 DROP IN RIGHT EYE DAILY  
  
 levothyroxine 50 mcg tablet Commonly known as:  SYNTHROID  
TAKE 1 TABLET BY MOUTH EVERY DAY BEFORE BREAKFAST  
  
 loratadine 10 mg tablet Commonly known as:  [place] Take 10 mg by mouth.  
  
 losartan 50 mg tablet Commonly known as:  COZAAR  
TAKE 1 TABLET BY MOUTH EVERY DAY  
  
 naproxen 250 mg tablet Commonly known as:  NAPROSYN  
TAKE 1 TABLET BY MOUTH TWICE A DAY WITH MEALS FOR 30 DAYS  
  
 omeprazole 20 mg capsule Commonly known as:  PRILOSEC  
TAKE 2 CAPSULES BY MOUTH EVERY DAY  
  
 [employer] [address] 90 mcg/actuation Aepb Generic drug:  albuterol sulfate Take 1-2 Puffs by inhalation every four (4) hours.   
  
 PROTEIN NUTRITIONAL SHAKE PO  
 Take 1 Bottle by mouth daily. rosuvastatin 20 mg tablet Commonly known as:  CRESTOR  
TAKE 1 TABLET BY MOUTH EVERY DAY  
  
 tiZANidine 2 mg tablet Commonly known as:  Francisco Javier Crandall TAKE 1 TABLET AT BEDTIME AS NEEDED FOR MUSCLE SPASM. VITAMIN D3 1,000 unit tablet Generic drug:  cholecalciferol Take 1,000 Units by mouth daily. Follow-up Instructions Return if symptoms worsen or fail to improve. Patient Instructions Cataract Surgery: Before Your Surgery What is cataract surgery? Cataracts are cloudy areas in the lens of your eye. Your lens is behind the colored part of your eye (iris). Its job is to focus light onto the back of your eye. In some people, cataracts prevent light from reaching the back of the eye. This can cause vision problems. Cataract surgery helps you see better. It replaces your natural lens, which has become cloudy, with a clear artificial one. There are two types of cataract surgery. Phacoemulsification (say \"qjrc-qo-pf-bmf-awf-knp-ALEJANDRO-shun\") is the most common type. The doctor makes a small cut in your eye. This cut is called an incision. The doctor uses a special ultrasound tool to break your cloudy lens apart. Sometimes a laser is used too. Then he or she removes the small pieces of the lens through the incision. In most cases, the doctor then inserts an artificial lens through the incision. Most people do not need stitches, because the incision is so small. If the doctor is not able to put in an artificial lens, you can wear a contact lens or thick glasses in place of your natural lens. Extracapsular extraction is a less common type of cataract surgery. The doctor makes a larger incision to remove the whole lens at once. After the doctor removes the lens, he or she stitches up the incision. Recovery from this type of surgery takes longer. Before either surgery, the doctor puts numbing drops in your eye.  Some doctors use a shot instead. You may also get medicine to make you feel relaxed. You probably will not feel much pain. The surgery takes about 20 to 40 minutes. After surgery, you may have a bandage or shield on your eye. You will probably go home from surgery after 1 hour in the recovery room. Most people see better in 1 to 3 days. You may be able to go back to work or your normal routine in a few days. It could take 3 to 10 weeks for your eye to completely heal. After your eye heals, you may still need to wear glasses, especially for reading. Follow-up care is a key part of your treatment and safety. Be sure to make and go to all appointments, and call your doctor if you are having problems. It's also a good idea to know your test results and keep a list of the medicines you take. What happens before surgery? ?Surgery can be stressful. This information will help you understand what you can expect. And it will help you safely prepare for surgery. ? Preparing for surgery ? · Understand exactly what surgery is planned, along with the risks, benefits, and other options. · Tell your doctors ALL the medicines, vitamins, supplements, and herbal remedies you take. Some of these can increase the risk of bleeding or interact with anesthesia. ? · If you take blood thinners, such as warfarin (Coumadin), clopidogrel (Plavix), or aspirin, be sure to talk to your doctor. He or she will tell you if you should stop taking these medicines before your surgery. Make sure that you understand exactly what your doctor wants you to do.  
? · Your doctor will tell you which medicines to take or stop before your surgery. You may need to stop taking certain medicines a week or more before surgery. So talk to your doctor as soon as you can.  
? · If you have an advance directive, let your doctor know. It may include a living will and a durable power of  for health care.  Bring a copy to the hospital. If you don't have one, you may want to prepare one. It lets your doctor and loved ones know your health care wishes. Doctors advise that everyone prepare these papers before any type of surgery or procedure. What happens on the day of surgery? · Follow the instructions exactly about when to stop eating and drinking. If you don't, your surgery may be canceled. If your doctor told you to take your medicines on the day of surgery, take them with only a sip of water. ? · Take a bath or shower before you come in for your surgery. Do not apply lotions, perfumes, deodorants, or nail polish. ? · Take off all jewelry and piercings. And take out contact lenses, if you wear them. ? At the hospital or surgery center · Bring a picture ID. ? · The area for surgery is often marked to make sure there are no errors. ? · You will be kept comfortable and safe by your anesthesia provider. The anesthesia may make you sleep. Or it may just numb the area being worked on. ? · The surgery will take about 20 to 40 minutes. Going home · Be sure you have someone to drive you home. Anesthesia and pain medicine make it unsafe for you to drive. ? · You will be given more specific instructions about recovering from your surgery. They will cover things like diet, wound care, follow-up care, driving, and getting back to your normal routine. ? · You may have a bandage or patch over your eye. You may also have a clear shield over your eye. This prevents you from rubbing it. When should you call your doctor? · You have questions or concerns. ? · You don't understand how to prepare for your surgery. ? · You become ill before the surgery (such as fever, flu, or a cold). ? · You need to reschedule or have changed your mind about having the surgery. Where can you learn more? Go to http://alix-conrad.info/. Enter K474 in the search box to learn more about \"Cataract Surgery: Before Your Surgery. \" Current as of: March 3, 2017 Content Version: 11.4 © 8394-2584 Healthwise, [employer]. Care instructions adapted under license by [employer] (which disclaims liability or warranty for this information). If you have questions about a medical condition or this instruction, always ask your healthcare professional. [address] any warranty or liability for your use of this information. Introducing [facility]! Ciaran Krishnan introduces [employer] patient portal. Now you can access parts of your medical record, email your doctor's office, and request medication refills online. 1. In your internet browser, go to [email or web address]. [facility]/[employer] 2. Click on the First Time User? Click Here link in the Sign In box. You will see the New Member Sign Up page. 3. Enter your [employer] Access Code exactly as it appears below. You will not need to use this code after youve completed the sign-up process. If you do not sign up before the expiration date, you must request a new code. · [employer] Access Code: [email or web address] Expires: 4/10/2018  4:51 PM 
 
4. Enter the last four digits of your Social Security Number (xxxx) and Date of Birth (mm/dd/yyyy) as indicated and click Submit. You will be taken to the next sign-up page. 5. Create a [employer] ID. This will be your [employer] login ID and cannot be changed, so think of one that is secure and easy to remember. 6. Create a [employer] password. You can change your password at any time. 7. Enter your Password Reset Question and Answer. This can be used at a later time if you forget your password. 8. Enter your e-mail address. You will receive e-mail notification when new information is available in [address] 9. Click Sign Up. You can now view and download portions of your medical record. 10. Click the Download Summary menu link to download a portable copy of your medical information. If you have questions, please visit the Frequently Asked Questions section of the [employer] website. Remember, [employer] is NOT to be used for urgent needs. For medical emergencies, dial 911. Now available from your iPhone and Android! Please provide this summary of care documentation to your next provider. Your primary care clinician is listed as [address]. If you have any questions after today's visit, please call [phone].

## 2018-02-07 NOTE — PROGRESS NOTES
Identified pt with two pt identifiers(name and ). Chief Complaint   Patient presents with    Pre-op Exam     having cataract surgery on 2018    Medication Evaluation     has cut back to to one prilosec vs two prescribed and s/e she was experiencing of diarrhea have improved        Health Maintenance Due   Topic    EYE EXAM RETINAL OR DILATED Q1     GLAUCOMA SCREENING Q2Y        Wt Readings from Last 3 Encounters:   18 130 lb (59 kg)   10/10/17 119 lb (54 kg)   17 118 lb 9.6 oz (53.8 kg)     Temp Readings from Last 3 Encounters:   18 97 °F (36.1 °C) (Oral)   10/10/17 96.1 °F (35.6 °C) (Oral)   17 98.2 °F (36.8 °C) (Oral)     BP Readings from Last 3 Encounters:   18 149/77   10/10/17 148/67   17 129/64     Pulse Readings from Last 3 Encounters:   18 74   10/10/17 67   17 86         Learning Assessment:  :     Learning Assessment 2014   PRIMARY LEARNER Patient   HIGHEST LEVEL OF EDUCATION - PRIMARY LEARNER  DID NOT GRADUATE HIGH SCHOOL   BARRIERS PRIMARY LEARNER NONE   CO-LEARNER CAREGIVER No   PRIMARY LANGUAGE ENGLISH   LEARNER PREFERENCE PRIMARY LISTENING   ANSWERED BY patient    RELATIONSHIP SELF       Depression Screening:  :     PHQ over the last two weeks 2017   Little interest or pleasure in doing things Not at all   Feeling down, depressed or hopeless Not at all   Total Score PHQ 2 0       Fall Risk Assessment:  :     Fall Risk Assessment, last 12 mths 2017   Able to walk? Yes   Fall in past 12 months? No   Fall with injury? -   Number of falls in past 12 months -   Fall Risk Score -       Abuse Screening:  :     Abuse Screening Questionnaire 2017   Do you ever feel afraid of your partner? N N   Are you in a relationship with someone who physically or mentally threatens you? N N   Is it safe for you to go home?  Y Y       Coordination of Care Questionnaire:  :     1) Have you been to an emergency room, urgent care clinic since your last visit? no   Hospitalized since your last visit? no             2) Have you seen or consulted any other health care providers outside of [address] [place] [address] since your last visit? yes, has seen the eye doctor since last office visit  (Include any pap smears or colon screenings in this section.)    3) Do you have an Advance Directive on file? no  Are you interested in receiving information about Advance Directives? no    Patient is accompanied by daughter. I have received verbal consent from Jordana Gomez to discuss any/all medical information while they are present in the room. Reviewed record in preparation for visit and have obtained necessary documentation. Medication reconciliation up to date and corrected with patient at this time.

## 2018-02-12 DIAGNOSIS — E78.00 PURE HYPERCHOLESTEROLEMIA: ICD-10-CM

## 2018-02-12 RX ORDER — ROSUVASTATIN CALCIUM 20 MG/1
TABLET, COATED ORAL
Qty: 90 TAB | Refills: 1 | Status: SHIPPED | OUTPATIENT
Start: 2018-02-12 | End: 2018-05-10 | Stop reason: SDUPTHER

## 2018-04-19 ENCOUNTER — OFFICE VISIT (OUTPATIENT)
Dept: FAMILY MEDICINE CLINIC | Age: 83
End: 2018-04-19

## 2018-04-19 ENCOUNTER — HOSPITAL ENCOUNTER (OUTPATIENT)
Dept: LAB | Age: 83
Discharge: HOME | End: 2018-04-19
Payer: MEDICARE

## 2018-04-19 VITALS
HEART RATE: 83 BPM | BODY MASS INDEX: 23.04 KG/M2 | OXYGEN SATURATION: 97 % | SYSTOLIC BLOOD PRESSURE: 118 MMHG | WEIGHT: 130 LBS | TEMPERATURE: 98.1 F | HEIGHT: 63 IN | RESPIRATION RATE: 18 BRPM | DIASTOLIC BLOOD PRESSURE: 54 MMHG

## 2018-04-19 DIAGNOSIS — E03.9 HYPOTHYROIDISM, UNSPECIFIED TYPE: ICD-10-CM

## 2018-04-19 DIAGNOSIS — E11.9 TYPE 2 DIABETES MELLITUS WITHOUT COMPLICATION, WITHOUT LONG-TERM CURRENT USE OF INSULIN (HCC): Primary | ICD-10-CM

## 2018-04-19 DIAGNOSIS — E78.00 PURE HYPERCHOLESTEROLEMIA: ICD-10-CM

## 2018-04-19 DIAGNOSIS — I10 ESSENTIAL HYPERTENSION, BENIGN: ICD-10-CM

## 2018-04-19 LAB
ALBUMIN UR QL STRIP: 10 MG/L
CREATININE, URINE POC: 200 MG/DL
MICROALBUMIN/CREAT RATIO POC: <30 MG/G

## 2018-04-19 PROCEDURE — 85027 COMPLETE CBC AUTOMATED: CPT

## 2018-04-19 PROCEDURE — 36415 COLL VENOUS BLD VENIPUNCTURE: CPT

## 2018-04-19 PROCEDURE — 84443 ASSAY THYROID STIM HORMONE: CPT

## 2018-04-19 PROCEDURE — 80053 COMPREHEN METABOLIC PANEL: CPT

## 2018-04-19 PROCEDURE — 80061 LIPID PANEL: CPT

## 2018-04-19 PROCEDURE — 83036 HEMOGLOBIN GLYCOSYLATED A1C: CPT

## 2018-04-19 NOTE — PROGRESS NOTES
Subjective:   Leander Eason is a 80 y.o. female who is seen for follow up of diabetes, hypertension, hyperlipidemia and hypothyroidism. Cardiovascular risk analysis - 80 y.o. female diabetic  hypertension  hyperlipidemia. Compliance with treatment thus far has been excellent. ROS: taking medications as instructed, no medication side effects noted, no TIA's, no chest pain on exertion, no dyspnea on exertion, no swelling of ankles. New concerns:     Pt states that she has no concerns or complaints at this time. She has had no issues with her current medications, and has been taking them as prescribed. She is fasting today, for labs. Patient Active Problem List    Diagnosis Date Noted    Type 2 diabetes mellitus with nephropathy ([facility] 02/07/2018    CAP (community acquired pneumonia) 08/23/2017    Sepsis ([facility] 08/23/2017    Fall at home 02/23/2015    Right shoulder pain 02/23/2015    Facial bruising 02/23/2015    Osteoporosis 09/19/2012    Chronic back pain 04/13/2012    Type 2 diabetes mellitus without complication, without long-term current use of insulin ([facility] 07/14/2009    Essential hypertension, benign 07/14/2009    Hyperlipidemia 07/14/2009    Hypothyroidism 07/14/2009    Allergic rhinitis, cause unspecified 07/14/2009     Current Outpatient Prescriptions   Medication Sig Dispense Refill    rosuvastatin (CRESTOR) 20 mg tablet TAKE 1 TABLET BY MOUTH EVERY DAY 90 Tab 1    DUREZOL 0.05 % ophthalmic emulsion INSTILL 1 DROP IN RIGHT EYE AS DIRECTED  1    BESIVANCE 0.6 % drps ophthalmic suspension STARTING THE DAY BEFORE SURGERY, INSTILL 1 DROP IN RIGHT EYE TWICE A DAY  1    ILEVRO 0.3 % drps STARTING DAY BEFORE SURGERY, INSTILL 1 DROP IN RIGHT EYE DAILY  1    omeprazole (PRILOSEC) 20 mg capsule TAKE 2 CAPSULES BY MOUTH EVERY DAY 90 Cap 1    tiZANidine (ZANAFLEX) 2 mg tablet TAKE 1 TABLET AT BEDTIME AS NEEDED FOR MUSCLE SPASM.  15 Tab 0    levothyroxine (SYNTHROID) 50 mcg tablet TAKE 1 TABLET BY MOUTH EVERY DAY BEFORE BREAKFAST 90 Tab 1    naproxen (NAPROSYN) 250 mg tablet TAKE 1 TABLET BY MOUTH TWICE A DAY WITH MEALS FOR 30 DAYS 60 Tab 1    losartan (COZAAR) 50 mg tablet TAKE 1 TABLET BY MOUTH EVERY DAY 90 Tab 1    loratadine (CLARITIN) 10 mg tablet Take 10 mg by mouth.  LACTOSE-REDUCED FOOD (PROTEIN NUTRITIONAL SHAKE PO) Take 1 Bottle by mouth daily.  cholecalciferol, vitamin d3, (VITAMIN D) 1,000 unit tablet Take 1,000 Units by mouth daily.  calcium carbonate (CALCIUM 500) 500 mg (1,250 mg) tablet Take 1,250 mg by mouth daily.  aspirin delayed-release 81 mg tablet take 81 mg by mouth daily.  alendronate (FOSAMAX) 70 mg tablet Take 1 Tab by mouth every seven (7) days. 4 Tab 11    PROAIR RESPICLICK 90 mcg/actuation aepb Take 1-2 Puffs by inhalation every four (4) hours. 1 Inhaler 11     Allergies   Allergen Reactions    Advil Pm  [Ibuprofen-Diphenhydramine Cit]      Other reaction(s):  Adverse reaction to substance , Hives    Codeine Other (comments)     Causes stomach pains     Past Medical History:   Diagnosis Date    Allergic rhinitis     Diabetes ([address]     Hypercholesterolemia     Hypertension     Thyroid disease      Past Surgical History:   Procedure Laterality Date    BREAST SURGERY PROCEDURE UNLISTED      HX CHOLECYSTECTOMY       Family History   Problem Relation Age of Onset    Heart Disease Mother     Heart Disease Father     Diabetes Paternal Grandfather      Social History   Substance Use Topics    Smoking status: Never Smoker    Smokeless tobacco: Never Used    Alcohol use No      Lab Results  Component Value Date/Time   WBC 8.2 09/12/2017 11:16 AM   HGB 13.2 09/12/2017 11:16 AM   HCT 41.7 09/12/2017 11:16 AM   PLATELET 192 82/13/9216 11:16 AM   MCV 97 09/12/2017 11:16 AM     Lab Results  Component Value Date/Time   Hemoglobin A1c 5.6 09/12/2017 11:16 AM   Hemoglobin A1c 5.9 (H) 04/07/2017 09:57 AM   Hemoglobin A1c 6.0 (H) 10/05/2016 10:46 AM   Hemoglobin A1C 5.9 07/14/2009 11:12 AM   Glucose 94 09/12/2017 11:16 AM   Glucose (POC) 145 (H) 08/27/2017 04:07 PM   Microalbumin/Creat ratio (mg/g creat) 9 08/11/2010 09:05 AM   Microalb/Creat ratio (ug/mg creat.) 6.0 04/07/2017 09:57 AM   Microalbumin,urine random 1.05 08/11/2010 09:05 AM   LDL, calculated 23 09/12/2017 11:16 AM   Creatinine 0.91 09/12/2017 11:16 AM      Lab Results  Component Value Date/Time   Cholesterol, total 95 (L) 09/12/2017 11:16 AM   HDL Cholesterol 44 09/12/2017 11:16 AM   LDL, calculated 23 09/12/2017 11:16 AM   Triglyceride 141 09/12/2017 11:16 AM   CHOL/HDL Ratio 3.8 08/11/2010 09:10 AM     Lab Results  Component Value Date/Time   GFR est non-AA 56 (L) 09/12/2017 11:16 AM   GFR est AA 64 09/12/2017 11:16 AM   Creatinine 0.91 09/12/2017 11:16 AM   BUN 11 09/12/2017 11:16 AM   Sodium 143 09/12/2017 11:16 AM   Potassium 4.7 09/12/2017 11:16 AM   Chloride 104 09/12/2017 11:16 AM   CO2 25 09/12/2017 11:16 AM        Objective:     Visit Vitals    /54    Pulse 83    Temp 98.1 °F (36.7 °C) (Oral)    Resp 18    Ht 5' 3\" (1.6 m)    Wt 130 lb (59 kg)    SpO2 97%    BMI 23.03 kg/m2      Appearance: alert, well appearing, and in no distress. CVS exam BP noted to be well controlled today in office, S1, S2 normal, no gallop, no murmur, chest clear, no JVD, no HSM, no edema. Lab review: orders written for new lab studies as appropriate; see orders. Assessment/Plan:   Hyperlipidemia well controlled. current treatment plan is effective, no change in therapy  reviewed diet, exercise and weight control. ICD-10-CM ICD-9-CM    1. Type 2 diabetes mellitus without complication, without long-term current use of insulin (HCC) E11.9 250.00 CBC W/O DIFF      METABOLIC PANEL, COMPREHENSIVE      HEMOGLOBIN A1C WITH EAG      AMB POC URINE, MICROALBUMIN, SEMIQUANT (3 RESULTS)   2. Essential hypertension, benign I10 401.1    3. Pure hypercholesterolemia E78.00 272.0 LIPID PANEL   4. Hypothyroidism, unspecified type E03.9 244.9 THYROID CASCADE PROFILE   . Informed patient that we will notify her when her labs return, and inform her of any change in plan of care at that time. Educated about continued current medications, and returning to office with any questions or concerns. Pt informed to return to office with worsening of symptoms, or PRN with any questions or concerns. Pt verbalizes understanding of plan of care and denies further questions or concerns at this time.

## 2018-04-19 NOTE — MR AVS SNAPSHOT
[facility] 
 
 
 [facility] [address]  
[phone] Patient: Cruz Vicente MRN:  [id number] Visit Information Date & Time Provider Department Dept. Phone Encounter #  
 2018 10:00 AM Fatmata Holm  [address] [phone] [id number] Follow-up Instructions Return if symptoms worsen or fail to improve. Upcoming Health Maintenance Date Due  
 EYE EXAM RETINAL OR DILATED Q1 10/3/2014 GLAUCOMA SCREENING Q2Y 10/3/2015 HEMOGLOBIN A1C Q6M 3/12/2018 MICROALBUMIN Q1 2018 FOOT EXAM Q1 2018 LIPID PANEL Q1 2018 MEDICARE YEARLY EXAM 10/11/2018 DTaP/Tdap/Td series (2 - Td) 2027 Allergies as of 2018  Review Complete On: 2018 By: Fatmata Holm NP Severity Noted Reaction Type Reactions Advil Pm  [Ibuprofen-diphenhydramine Cit] Medium 2016 Other reaction(s): Adverse reaction to substance , Hives Codeine  2009    Other (comments) Causes stomach pains Current Immunizations  Reviewed on 10/10/2017 Name Date H1N1 FLU VACCINE 2010 Influenza High Dose Vaccine PF 10/10/2017 12:23 PM, 2016 11:30 AM  
 Influenza Vaccine 2014, 2013 Influenza Vaccine Split 10/2/2012  2:22 PM, 2009 Influenza Vaccine Whole 2010 Pneumococcal Conjugate (PCV-13) 2015 11:15 AM  
 Td, Adsorbed PF 2015 11:17 AM  
 ZZZ-RETIRED (DO NOT USE) Pneumococcal Vaccine (Unspecified Type) 2008 Zoster Vaccine, Live 2015 Not reviewed this visit You Were Diagnosed With   
  
 Codes Comments Type 2 diabetes mellitus without complication, without long-term current use of insulin (HCC)    -  Primary ICD-10-CM: E11.9 ICD-9-CM: 250.00 Essential hypertension, benign     ICD-10-CM: I10 
ICD-9-CM: 401.1 Pure hypercholesterolemia     ICD-10-CM: E78.00 ICD-9-CM: 272.0 Hypothyroidism, unspecified type     ICD-10-CM: E03.9 ICD-9-CM: 494. 9 Vitals BP Pulse Temp Resp Height(growth percentile) Weight(growth percentile) 118/54 83 98.1 °F (36.7 °C) (Oral) 18 5' 3\" (1.6 m) 130 lb (59 kg) SpO2 BMI OB Status Smoking Status 97% 23.03 kg/m2 Postmenopausal Never Smoker BMI and BSA Data Body Mass Index Body Surface Area 23.03 kg/m 2 1.62 m 2 Preferred Pharmacy Pharmacy Name Phone [facility][id number]- [address], [address] [facility]  [phone] Your Updated Medication List  
  
   
This list is accurate as of 4/19/18 10:20 AM.  Always use your most recent med list.  
  
  
  
  
 alendronate 70 mg tablet Commonly known as:  FOSAMAX Take 1 Tab by mouth every seven (7) days. aspirin delayed-release 81 mg tablet  
take 81 mg by mouth daily. BESIVANCE 0.6 % Drps ophthalmic suspension Generic drug:  besifloxacin STARTING THE DAY BEFORE SURGERY, INSTILL 1 DROP IN RIGHT EYE TWICE A DAY  
  
 CALCIUM 500 500 mg calcium (1,250 mg) tablet Generic drug:  calcium carbonate Take 1,250 mg by mouth daily. DUREZOL 0.05 % ophthalmic emulsion Generic drug:  Difluprednate INSTILL 1 DROP IN RIGHT EYE AS DIRECTED ILEVRO 0.3 % Drps Generic drug:  nepafenac STARTING DAY BEFORE SURGERY, INSTILL 1 DROP IN RIGHT EYE DAILY  
  
 levothyroxine 50 mcg tablet Commonly known as:  SYNTHROID  
TAKE 1 TABLET BY MOUTH EVERY DAY BEFORE BREAKFAST  
  
 loratadine 10 mg tablet Commonly known as:  Zo Galea Take 10 mg by mouth.  
  
 losartan 50 mg tablet Commonly known as:  COZAAR  
TAKE 1 TABLET BY MOUTH EVERY DAY  
  
 naproxen 250 mg tablet Commonly known as:  NAPROSYN  
TAKE 1 TABLET BY MOUTH TWICE A DAY WITH MEALS FOR 30 DAYS  
  
 omeprazole 20 mg capsule Commonly known as:  PRILOSEC  
TAKE 2 CAPSULES BY MOUTH EVERY DAY  
  
 [employer] 90 mcg/actuation Aepb Generic drug:  albuterol sulfate Take 1-2 Puffs by inhalation every four (4) hours. PROTEIN NUTRITIONAL SHAKE PO Take 1 Bottle by mouth daily. rosuvastatin 20 mg tablet Commonly known as:  CRESTOR  
TAKE 1 TABLET BY MOUTH EVERY DAY  
  
 tiZANidine 2 mg tablet Commonly known as:  Agueda Ramon TAKE 1 TABLET AT BEDTIME AS NEEDED FOR MUSCLE SPASM. VITAMIN D3 1,000 unit tablet Generic drug:  cholecalciferol Take 1,000 Units by mouth daily. We Performed the Following AMB POC URINE, MICROALBUMIN, SEMIQUANT (3 RESULTS) [22722 CPT(R)] CBC W/O DIFF [05994 CPT(R)] HEMOGLOBIN A1C WITH EAG [86107 CPT(R)] LIPID PANEL [78074 CPT(R)] METABOLIC PANEL, COMPREHENSIVE [88212 CPT(R)] THYROID CASCADE PROFILE [[id number] Custom] Follow-up Instructions Return if symptoms worsen or fail to improve. Patient Instructions DASH Diet: Care Instructions Your Care Instructions The DASH diet is an eating plan that can help lower your blood pressure. DASH stands for Dietary Approaches to Stop Hypertension. Hypertension is high blood pressure. The DASH diet focuses on eating foods that are high in calcium, potassium, and magnesium. These nutrients can lower blood pressure. The foods that are highest in these nutrients are fruits, vegetables, low-fat dairy products, nuts, seeds, and legumes. But taking calcium, potassium, and magnesium supplements instead of eating foods that are high in those nutrients does not have the same effect. The DASH diet also includes whole grains, fish, and poultry. The DASH diet is one of several lifestyle changes your doctor may recommend to lower your high blood pressure. Your doctor may also want you to decrease the amount of sodium in your diet. Lowering sodium while following the DASH diet can lower blood pressure even further than just the DASH diet alone. Follow-up care is a key part of your treatment and safety.  Be sure to make and go to all appointments, and call your doctor if you are having problems. It's also a good idea to know your test results and keep a list of the medicines you take. How can you care for yourself at home? Following the DASH diet · Eat 4 to 5 servings of fruit each day. A serving is 1 medium-sized piece of fruit, ½ cup chopped or canned fruit, 1/4 cup dried fruit, or 4 ounces (½ cup) of fruit juice. Choose fruit more often than fruit juice. · Eat 4 to 5 servings of vegetables each day. A serving is 1 cup of lettuce or raw leafy vegetables, ½ cup of chopped or cooked vegetables, or 4 ounces (½ cup) of vegetable juice. Choose vegetables more often than vegetable juice. · Get 2 to 3 servings of low-fat and fat-free dairy each day. A serving is 8 ounces of milk, 1 cup of yogurt, or 1 ½ ounces of cheese. · Eat 6 to 8 servings of grains each day. A serving is 1 slice of bread, 1 ounce of dry cereal, or ½ cup of cooked rice, pasta, or cooked cereal. Try to choose whole-grain products as much as possible. · Limit lean meat, poultry, and fish to 2 servings each day. A serving is 3 ounces, about the size of a deck of cards. · Eat 4 to 5 servings of nuts, seeds, and legumes (cooked dried beans, lentils, and split peas) each week. A serving is 1/3 cup of nuts, 2 tablespoons of seeds, or ½ cup of cooked beans or peas. · Limit fats and oils to 2 to 3 servings each day. A serving is 1 teaspoon of vegetable oil or 2 tablespoons of salad dressing. · Limit sweets and added sugars to 5 servings or less a week. A serving is 1 tablespoon jelly or jam, ½ cup sorbet, or 1 cup of lemonade. · Eat less than 2,300 milligrams (mg) of sodium a day. If you limit your sodium to 1,500 mg a day, you can lower your blood pressure even more. Tips for success · Start small. Do not try to make dramatic changes to your diet all at once.  You might feel that you are missing out on your favorite foods and then be more likely to not follow the plan. Make small changes, and stick with them. Once those changes become habit, add a few more changes. · Try some of the following: ¨ Make it a goal to eat a fruit or vegetable at every meal and at snacks. This will make it easy to get the recommended amount of fruits and vegetables each day. ¨ Try yogurt topped with fruit and nuts for a snack or healthy dessert. ¨ Add lettuce, tomato, cucumber, and onion to sandwiches. ¨ Combine a ready-made pizza crust with low-fat mozzarella cheese and lots of vegetable toppings. Try using tomatoes, squash, spinach, broccoli, carrots, cauliflower, and onions. ¨ Have a variety of cut-up vegetables with a low-fat dip as an appetizer instead of chips and dip. ¨ Sprinkle sunflower seeds or chopped almonds over salads. Or try adding chopped walnuts or almonds to cooked vegetables. ¨ Try some vegetarian meals using beans and peas. Add garbanzo or kidney beans to salads. Make burritos and tacos with mashed cat beans or black beans. Where can you learn more? Go to http://alix-conrad.info/. Enter M496 in the search box to learn more about \"DASH Diet: Care Instructions. \" Current as of: September 21, 2016 Content Version: 11.4 © 4789-1445 [employer] ([employer]). Care instructions adapted under license by [employer] (which disclaims liability or warranty for this information). If you have questions about a medical condition or this instruction, always ask your healthcare professional. [place] [address] any warranty or liability for your use of this information. Introducing [facility] & HEALTH SERVICES! [employer] introduces CoSchedule patient portal. Now you can access parts of your medical record, email your doctor's office, and request medication refills online. 1. In your internet browser, go to [email or web address] 2. Click on the First Time User? Click Here link in the Sign In box. You will see the New Member Sign Up page. 3. Enter your Yecuris Access Code exactly as it appears below. You will not need to use this code after youve completed the sign-up process. If you do not sign up before the expiration date, you must request a new code. · [employer] Access Code: [email or web address] Expires: 7/18/2018 10:20 AM 
 
4. Enter the last four digits of your Social Security Number (xxxx) and Date of Birth (mm/dd/yyyy) as indicated and click Submit. You will be taken to the next sign-up page. 5. Create a Yecuris ID. This will be your Yecuris login ID and cannot be changed, so think of one that is secure and easy to remember. 6. Create a Yec[employer]is password. You can change your password at any time. 7. Enter your Password Reset Question and Answer. This can be used at a later time if you forget your password. 8. Enter your e-mail address. You will receive e-mail notification when new information is available in [address] 9. Click Sign Up. You can now view and download portions of your medical record. 10. Click the Download Summary menu link to download a portable copy of your medical information. If you have questions, please visit the Frequently Asked Questions section of the [employer] website. Remember, [employer]ec[employer]is is NOT to be used for urgent needs. For medical emergencies, dial 911. Now available from your iPhone and Android! Please provide this summary of care documentation to your next provider. Your primary care clinician is listed as [place] [address]. If you have any questions after today's visit, please call [phone].

## 2018-04-19 NOTE — PROGRESS NOTES
Chief Complaint   Patient presents with    Physical     fasting labs today also     \"REVIEWED RECORD IN PREPARATION FOR VISIT AND HAVE OBTAINED THE NECESSARY DOCUMENTATION\"  1. Have you been to the ER, urgent care clinic since your last visit? Hospitalized since your last visit? No    2. Have you seen or consulted any other health care providers outside of the [facility] since your last visit? Include any pap smears or colon screening. No  Patient does not have advanced directives.

## 2018-04-19 NOTE — LETTER
4/20/2018 3:08 PM 
 
[facility] [id number] Dear Leander Eason: Please find your most recent results below. Resulted Orders CBC W/O DIFF Result Value Ref Range WBC 8.6 3.4 - 10.8 x10E3/uL  
 RBC 5.13 3.77 - 5.28 x10E6/uL HGB 15.8 11.1 - 15.9 g/dL HCT 47.7 (H) 34.0 - 46.6 % MCV 93 79 - 97 fL  
 MCH 30.8 26.6 - 33.0 pg  
 MCHC 33.1 31.5 - 35.7 g/dL  
 RDW 14.3 12.3 - 15.4 % PLATELET 743 974 - 406 x10E3/uL Narrative Performed at:  [address], [address] [place] [address]  [id number] : Rupinder Fields MD, Phone:  [phone] METABOLIC PANEL, COMPREHENSIVE Result Value Ref Range Glucose 102 (H) 65 - 99 mg/dL BUN 15 10 - 36 mg/dL Creatinine 1.03 (H) 0.57 - 1.00 mg/dL GFR est non-AA 48 (L) >59 mL/min/1.73 GFR est AA 55 (L) >59 mL/min/1.73  
 BUN/Creatinine ratio 15 12 - 28 Sodium 142 134 - 144 mmol/L Potassium 4.5 3.5 - 5.2 mmol/L Chloride 99 96 - 106 mmol/L  
 CO2 25 18 - 29 mmol/L Calcium 9.3 8.7 - 10.3 mg/dL Protein, total 6.9 6.0 - 8.5 g/dL Albumin 4.3 3.2 - 4.6 g/dL GLOBULIN, TOTAL 2.6 1.5 - 4.5 g/dL A-G Ratio 1.7 1.2 - 2.2 Bilirubin, total 0.4 0.0 - 1.2 mg/dL Alk. phosphatase 58 39 - 117 IU/L  
 AST (SGOT) 34 0 - 40 IU/L  
 ALT (SGPT) 19 0 - 32 IU/L Narrative Performed at:  [address], [address] [place] [address]  [id number] : Rupinder Fields MD, Phone:  [phone] LIPID PANEL Result Value Ref Range Cholesterol, total 127 100 - 199 mg/dL Triglyceride 176 (H) 0 - 149 mg/dL HDL Cholesterol 49 >39 mg/dL VLDL, calculated 35 5 - 40 mg/dL LDL, calculated 43 0 - 99 mg/dL Narrative Performed at:  [place] [id number] : Rupinder Fields MD, Phone:  [phone] HEMOGLOBIN A1C WITH EAG Result Value Ref Range Hemoglobin A1c 5.7 (H) 4.8 - 5.6 % Comment:  
            Pre-diabetes: 5.7 - 6.4 Diabetes: >6.4 Glycemic control for adults with diabetes: <7.0 Estimated average glucose 117 mg/dL Narrative Performed at:  [address]  [id number] : Aguila Mejía MD, Phone:  [phone] THYROID CASCADE PROFILE Result Value Ref Range TSH 1.250 0.450 - 4.500 uIU/mL Narrative Performed at:  [address]  [id number] : Aguila Mejía MD, Phone:  [phone] AMB POC URINE, MICROALBUMIN, SEMIQUANT (3 RESULTS) Result Value Ref Range ALBUMIN, URINE POC 10 Negative mg/L  
 CREATININE, URINE  mg/dL Microalbumin/creat ratio (POC) <30 <30 MG/G Comment:  
   normal  
CVD REPORT Result Value Ref Range INTERPRETATION Note Comment:  
   Supplemental report is available. PDF IMAGE Not applicable Narrative Performed at:  [address] [place] [address]  [id number] : Nacho Orozco PhD, Phone:  [phone] CKD REPORT Result Value Ref Range Interpretation Note Comment:  
   ------------------------------- 
CHRONIC KIDNEY DISEASE: 
EGFR, BLOOD PRESSURE, AND PROTEINURIA ASSESSMENT The overall regression of eGFR with time is not 
statistically significant. Current eGFR is 48 mL/min/1.73mE2 
corresponding to CKD stage 3a. Multiply eGFR by 1.159 if 
patient is . Potassium is within goal and 
has not changed significantly, was 4.7 and now is 4.5 
mmol/L. Hemoglobin A1C is 5.7 %; diabetic status is unknown. Refer to ADA guidelines for clinical practice 
recommendations. EGFR, BLOOD PRESSURE, AND PROTEINURIA TREATMENT SUGGESTIONS 
- Given patient's advanced age, we are unable to provide 
specific blood pressure treatment suggestions. Individualize blood pressure goals based on the patient's comorbidities 
and to avoid orthostatic hypotension and other medication 
side effects. Assessment of albuminuria (urine 
albumin:creatinine ratio or urine protein:creatinine ratio 
preferred) is recommended at least annually in CKD patients 
for staging a 
nd disease prognosis. EGFR, BLOOD PRESSURE, AND PROTEINURIA FOLLOW-UP 
- 
fasting Renal Panel within 12 months; Spot Urine Panel (Albumin preferred) is recommended by KDOQI guidelines, at 
least yearly; 
- 
BONE and MINERAL ASSESSMENT Calcium is within goal and has not changed significantly, 
was 9.0 and now is 9.3 mg/dL. Carbon Dioxide is within goal 
and has not changed significantly, was 25 and now is 25 
mmol/L. BONE and MINERAL TREATMENT SUGGESTIONS 
- Interpretations require simultaneous measurements of serum 
calcium and phosphorus. BONE and MINERAL FOLLOW-UP 
- 
fasting PTH with Renal Panel is recommended by KDOQI 
guidelines, at least yearly; 25-Hydroxy Vitamin D within 5 
months; 
- 
LIPIDS ASSESSMENT 
LDL-C is optimal and has risen, was 23 and now is 43 mg/dL. Triglyceride is borderline high and has risen, was 141 and 
now is 176 mg/dL. Non-HDL Cholesterol is optimal and has 
risen, was 51 and now is 78 mg/dL. HDL-C is normal and has 
risen, was 44 and now is 49 mg/dL Jeannine Jackson LIPIDS TREATMENT SUGGESTIONS 
- Therapeutic lifestyle changes are always valuable to 
maintain optimal blood lipid status (diet, exercise, weight 
management). Continue statin if in use. Consider measurement 
of LDL particle number or Apo B to adjudicate need for 
further LDL lowering therapy. If statin cannot be tolerated 
or increased, alternatives include use of an intestinal 
agent (ezetimibe or bile acid sequestrant), niacin, and/or 
fish oil. LIPIDS FOLLOW-UP 
- 
fasting Lipid Panel within 12 months; 
- 
ANEMIA ASSESSMENT Hemoglobin is normal and has risen, was 13.2 and now is 15.8 g/dL. Hemoglobin target assumes KASH is not in use. ANEMIA TREATMENT SUGGESTIONS 
- No specific change of treatment is indicated at this time. ANEMIA FOLLOW-UP 
- 
CBC within 12 months; 
------------------------------- DISCLAIMER These assessments and treatment suggestions are provided as 
a convenience in support of the physician-patient 
relationship and are not intended to replace the lissett duffyian's 
clinical judgment. They are derived from national guidelines 
in addition to other evidence and expert opinion. The 
clinician should consider this information within the 
context of clinical opinion and the individual patient. SEE GUIDANCE FOR CHRONIC KIDNEY DISEASE PROGRAM: Kidney Disease Improving Global Outcomes (KDIGO) clinical practice 
guidelines are at http://kdigo. org/home/guidelines/. 
[address] [facility] Kidney Disease Outcomes Quality Initiative (KDOQI (TM)), with its limitations and 
disclaimers, are at www.kidney. org/professionals/KDOQI. This 
program is intended for patients who have been diagnosed 
with stages 3, 4, or pre-dialysis 5 CKD. It is not intended 
for children, pregnant patients, or transplant patients. Narrative Performed at:  [address] [place] [address] [address] [place] [address]  [id number] : Sin Webb PhD, Phone:  [phone] DIABETES PATIENT EDUCATION Result Value Ref Range PDF Image Not applicable Narrative Performed at:  [facility] [place] [address]  [id number] : Sin Webb PhD, Phone:  [phone] RECOMMENDATIONS: 
Please call patient and let her know that her labs returned: 1. Malen Martha function is mildly altered, probably due to mild dehydration.  Should focus on staying well hydrated, and should re-check in 6 months Otherwise, all stable! Thanks! Please call me if you have any questions: [phone] Sincerely, Brennan Cutler NP

## 2018-04-19 NOTE — PATIENT INSTRUCTIONS

## 2018-04-20 ENCOUNTER — TELEPHONE (OUTPATIENT)
Dept: FAMILY MEDICINE CLINIC | Age: 83
End: 2018-04-20

## 2018-04-20 LAB
ALBUMIN SERPL-MCNC: 4.3 G/DL (ref 3.2–4.6)
ALBUMIN/GLOB SERPL: 1.7 {RATIO} (ref 1.2–2.2)
ALP SERPL-CCNC: 58 IU/L (ref 39–117)
ALT SERPL-CCNC: 19 IU/L (ref 0–32)
AST SERPL-CCNC: 34 IU/L (ref 0–40)
BILIRUB SERPL-MCNC: 0.4 MG/DL (ref 0–1.2)
BUN SERPL-MCNC: 15 MG/DL (ref 10–36)
BUN/CREAT SERPL: 15 (ref 12–28)
CALCIUM SERPL-MCNC: 9.3 MG/DL (ref 8.7–10.3)
CHLORIDE SERPL-SCNC: 99 MMOL/L (ref 96–106)
CHOLEST SERPL-MCNC: 127 MG/DL (ref 100–199)
CO2 SERPL-SCNC: 25 MMOL/L (ref 18–29)
CREAT SERPL-MCNC: 1.03 MG/DL (ref 0.57–1)
ERYTHROCYTE [DISTWIDTH] IN BLOOD BY AUTOMATED COUNT: 14.3 % (ref 12.3–15.4)
EST. AVERAGE GLUCOSE BLD GHB EST-MCNC: 117 MG/DL
GFR SERPLBLD CREATININE-BSD FMLA CKD-EPI: 48 ML/MIN/1.73
GFR SERPLBLD CREATININE-BSD FMLA CKD-EPI: 55 ML/MIN/1.73
GLOBULIN SER CALC-MCNC: 2.6 G/DL (ref 1.5–4.5)
GLUCOSE SERPL-MCNC: 102 MG/DL (ref 65–99)
HBA1C MFR BLD: 5.7 % (ref 4.8–5.6)
HCT VFR BLD AUTO: 47.7 % (ref 34–46.6)
HDLC SERPL-MCNC: 49 MG/DL
HGB BLD-MCNC: 15.8 G/DL (ref 11.1–15.9)
INTERPRETATION, 910389: NORMAL
INTERPRETATION: NORMAL
LDLC SERPL CALC-MCNC: 43 MG/DL (ref 0–99)
Lab: NORMAL
MCH RBC QN AUTO: 30.8 PG (ref 26.6–33)
MCHC RBC AUTO-ENTMCNC: 33.1 G/DL (ref 31.5–35.7)
MCV RBC AUTO: 93 FL (ref 79–97)
PDF IMAGE, 910387: NORMAL
PLATELET # BLD AUTO: 259 X10E3/UL (ref 150–379)
POTASSIUM SERPL-SCNC: 4.5 MMOL/L (ref 3.5–5.2)
PROT SERPL-MCNC: 6.9 G/DL (ref 6–8.5)
RBC # BLD AUTO: 5.13 X10E6/UL (ref 3.77–5.28)
SODIUM SERPL-SCNC: 142 MMOL/L (ref 134–144)
TRIGL SERPL-MCNC: 176 MG/DL (ref 0–149)
TSH SERPL DL<=0.005 MIU/L-ACNC: 1.25 UIU/ML (ref 0.45–4.5)
VLDLC SERPL CALC-MCNC: 35 MG/DL (ref 5–40)
WBC # BLD AUTO: 8.6 X10E3/UL (ref 3.4–10.8)

## 2018-04-20 NOTE — TELEPHONE ENCOUNTER
Spoke with patient regarding lab results per NP and relayed her message. Patient receptive to recommendations. Labs and recommendations also mailed.

## 2018-04-20 NOTE — PROGRESS NOTES
Please call patient and let her know that her labs returned:  1. Kidney function is mildly altered, probably due to mild dehydration. Should focus on staying well hydrated, and should re-check in 6 months  Otherwise, all stable! Thanks!

## 2018-04-20 NOTE — TELEPHONE ENCOUNTER
----- Message from Alina Prince NP sent at 4/20/2018 11:55 AM EDT -----  Please call patient and let her know that her labs returned:  1. Kidney function is mildly altered, probably due to mild dehydration. Should focus on staying well hydrated, and should re-check in 6 months  Otherwise, all stable! Thanks!

## 2018-05-07 RX ORDER — OMEPRAZOLE 20 MG/1
CAPSULE, DELAYED RELEASE ORAL
Qty: 90 CAP | Refills: 1 | Status: SHIPPED | OUTPATIENT
Start: 2018-05-07 | End: 2018-08-06 | Stop reason: SDUPTHER

## 2018-05-07 RX ORDER — LOSARTAN POTASSIUM 50 MG/1
TABLET ORAL
Qty: 90 TAB | Refills: 1 | Status: SHIPPED | OUTPATIENT
Start: 2018-05-07 | End: 2018-11-01 | Stop reason: SDUPTHER

## 2018-05-10 DIAGNOSIS — E78.00 PURE HYPERCHOLESTEROLEMIA: ICD-10-CM

## 2018-05-10 RX ORDER — LEVOTHYROXINE SODIUM 50 UG/1
TABLET ORAL
Qty: 90 TAB | Refills: 1 | Status: SHIPPED | OUTPATIENT
Start: 2018-05-10 | End: 2018-11-01 | Stop reason: SDUPTHER

## 2018-05-10 RX ORDER — ROSUVASTATIN CALCIUM 20 MG/1
TABLET, COATED ORAL
Qty: 90 TAB | Refills: 1 | Status: SHIPPED | OUTPATIENT
Start: 2018-05-10 | End: 2018-11-01 | Stop reason: SDUPTHER

## 2018-05-10 RX ORDER — OMEPRAZOLE 20 MG/1
CAPSULE, DELAYED RELEASE ORAL
Qty: 90 CAP | Refills: 1 | Status: CANCELLED | OUTPATIENT
Start: 2018-05-10

## 2018-05-10 RX ORDER — LOSARTAN POTASSIUM 50 MG/1
TABLET ORAL
Qty: 90 TAB | Refills: 1 | Status: CANCELLED | OUTPATIENT
Start: 2018-05-10

## 2018-05-11 NOTE — TELEPHONE ENCOUNTER
Pt was last seen on 04/19/2018. Both of these scripts were erx to pt's [facility] pharmacy on 05/07/18 for qty#90 with one refill. I have printed and faxed this information to her [facility] pharmacy with this notation at the bottom of the page alerting them to this.

## 2018-05-14 RX ORDER — OMEPRAZOLE 20 MG/1
CAPSULE, DELAYED RELEASE ORAL
Qty: 90 CAP | Refills: 1 | OUTPATIENT
Start: 2018-05-14

## 2018-05-14 RX ORDER — LOSARTAN POTASSIUM 50 MG/1
TABLET ORAL
Qty: 90 TAB | Refills: 1 | OUTPATIENT
Start: 2018-05-14

## 2018-05-22 RX ORDER — OMEPRAZOLE 20 MG/1
CAPSULE, DELAYED RELEASE ORAL
Qty: 90 CAP | Refills: 1 | OUTPATIENT
Start: 2018-05-22

## 2018-05-22 RX ORDER — LOSARTAN POTASSIUM 50 MG/1
TABLET ORAL
Qty: 90 TAB | Refills: 1 | OUTPATIENT
Start: 2018-05-22

## 2018-08-08 RX ORDER — OMEPRAZOLE 20 MG/1
CAPSULE, DELAYED RELEASE ORAL
Qty: 90 CAP | Refills: 1 | Status: SHIPPED | OUTPATIENT
Start: 2018-08-08 | End: 2019-05-25 | Stop reason: SDUPTHER

## 2018-10-17 ENCOUNTER — OFFICE VISIT (OUTPATIENT)
Dept: FAMILY MEDICINE CLINIC | Age: 83
End: 2018-10-17

## 2018-10-17 VITALS
WEIGHT: 130 LBS | BODY MASS INDEX: 23.04 KG/M2 | SYSTOLIC BLOOD PRESSURE: 136 MMHG | TEMPERATURE: 97.6 F | RESPIRATION RATE: 18 BRPM | OXYGEN SATURATION: 97 % | HEART RATE: 79 BPM | DIASTOLIC BLOOD PRESSURE: 57 MMHG | HEIGHT: 63 IN

## 2018-10-17 DIAGNOSIS — E11.9 TYPE 2 DIABETES MELLITUS WITHOUT COMPLICATION, WITHOUT LONG-TERM CURRENT USE OF INSULIN (HCC): ICD-10-CM

## 2018-10-17 DIAGNOSIS — Z00.00 MEDICARE ANNUAL WELLNESS VISIT, SUBSEQUENT: Primary | ICD-10-CM

## 2018-10-17 DIAGNOSIS — L84 CORN OR CALLUS: ICD-10-CM

## 2018-10-17 DIAGNOSIS — M25.511 CHRONIC RIGHT SHOULDER PAIN: ICD-10-CM

## 2018-10-17 DIAGNOSIS — G89.29 CHRONIC RIGHT SHOULDER PAIN: ICD-10-CM

## 2018-10-17 DIAGNOSIS — E55.9 VITAMIN D DEFICIENCY: ICD-10-CM

## 2018-10-17 DIAGNOSIS — E78.00 PURE HYPERCHOLESTEROLEMIA: ICD-10-CM

## 2018-10-17 RX ORDER — NAPROXEN 250 MG/1
TABLET ORAL
Qty: 60 TAB | Refills: 5 | Status: SHIPPED | OUTPATIENT
Start: 2018-10-17 | End: 2021-07-20 | Stop reason: ALTCHOICE

## 2018-10-17 NOTE — PROGRESS NOTES
Identified pt with two pt identifiers(name and ). Chief Complaint Patient presents with Ola.Schwab Annual Wellness Visit [address]  Foot Pain Due for Foot exam. has knots inbetween toes. Health Maintenance Due Topic  Shingrix Vaccine Age 50> (1 of 2)  FOOT EXAM Q1   
 MEDICARE YEARLY EXAM   
 HEMOGLOBIN A1C Q6M Wt Readings from Last 3 Encounters:  
10/17/18 130 lb (59 kg) 18 130 lb (59 kg) 18 130 lb (59 kg) Temp Readings from Last 3 Encounters:  
10/17/18 97.6 °F (36.4 °C) (Oral) 18 98.1 °F (36.7 °C) (Oral) 18 97 °F (36.1 °C) (Oral) BP Readings from Last 3 Encounters:  
10/17/18 136/57  
18 118/54  
18 149/77 Pulse Readings from Last 3 Encounters:  
10/17/18 79  
18 83  
18 74 Learning Assessment: 
:  
 
Learning Assessment 2014 PRIMARY LEARNER Patient HIGHEST LEVEL OF EDUCATION - PRIMARY LEARNER  DID NOT GRADUATE HIGH SCHOOL  
BARRIERS PRIMARY LEARNER NONE  
CO-LEARNER CAREGIVER No  
PRIMARY LANGUAGE ENGLISH  
LEARNER PREFERENCE PRIMARY LISTENING  
ANSWERED BY patient RELATIONSHIP SELF Depression Screening: 
:  
 
PHQ over the last two weeks 10/17/2018 Little interest or pleasure in doing things Not at all Feeling down, depressed, irritable, or hopeless Not at all Total Score PHQ 2 0 Fall Risk Assessment: 
:  
 
Fall Risk Assessment, last 12 mths 10/17/2018 Able to walk? Yes Fall in past 12 months? No  
Fall with injury? -  
Number of falls in past 12 months - Fall Risk Score -  
 
 
Abuse Screening: 
:  
 
Abuse Screening Questionnaire 10/17/2018 2018 2017 2014 Do you ever feel afraid of your partner? N N N N Are you in a relationship with someone who physically or mentally threatens you? N N N N Is it safe for you to go home? [place] Coordination of Care Questionnaire: 
:  
 
 1) Have you been to an emergency room, urgent care clinic since your last visit? no  
Hospitalized since your last visit? no          
 
2) Have you seen or consulted any other health care providers outside of [address] [place] [address] since your last visit? no  (Include any pap smears or colon screenings in this section.) 3) Do you have an Advance Directive on file? no 
Are you interested in receiving information about Advance Directives? no 
 
Reviewed record in preparation for visit and have obtained necessary documentation. Medication reconciliation up to date and corrected with patient at this time.

## 2018-10-17 NOTE — PATIENT INSTRUCTIONS

## 2018-10-17 NOTE — PROGRESS NOTES
This is the Subsequent Medicare Annual Wellness Exam, performed 12 months or more after the Initial AWV or the last Subsequent AWV I have reviewed the patient's medical history in detail and updated the computerized patient record. History Past Medical History:  
Diagnosis Date  Allergic rhinitis  Diabetes ([address]  Hypercholesterolemia  Hypertension  Thyroid disease Past Surgical History:  
Procedure Laterality Date  BREAST SURGERY PROCEDURE UNLISTED  HX CHOLECYSTECTOMY Current Outpatient Medications Medication Sig Dispense Refill  naproxen (NAPROSYN) 250 mg tablet TAKE 1 TABLET BY MOUTH TWICE A DAY WITH MEALS FOR 30 DAYS 60 Tab 5  
 omeprazole (PRILOSEC) 20 mg capsule TAKE 2 CAPSULES BY MOUTH EVERY DAY (Patient taking differently: TAKE 1 CAPSULES BY MOUTH EVERY DAY) 90 Cap 1  rosuvastatin (CRESTOR) 20 mg tablet TAKE 1 TABLET BY MOUTH EVERY DAY 90 Tab 1  
 levothyroxine (SYNTHROID) 50 mcg tablet TAKE 1 TABLET BY MOUTH EVERY DAY BEFORE BREAKFAST 90 Tab 1  
 losartan (COZAAR) 50 mg tablet TAKE 1 TABLET BY MOUTH EVERY DAY 90 Tab 1  
 loratadine (CLARITIN) 10 mg tablet Take 10 mg by mouth.  LACTOSE-REDUCED FOOD (PROTEIN NUTRITIONAL SHAKE PO) Take 1 Bottle by mouth daily.  cholecalciferol, vitamin d3, (VITAMIN D) 1,000 unit tablet Take 1,000 Units by mouth daily.  calcium carbonate (CALCIUM 500) 500 mg (1,250 mg) tablet Take 1,250 mg by mouth daily.  aspirin delayed-release 81 mg tablet take 81 mg by mouth daily.  DUREZOL 0.05 % ophthalmic emulsion INSTILL 1 DROP IN RIGHT EYE AS DIRECTED  1  
 BESIVANCE 0.6 % drps ophthalmic suspension STARTING THE DAY BEFORE SURGERY, INSTILL 1 DROP IN RIGHT EYE TWICE A DAY  1  
 ILEVRO 0.3 % drps STARTING DAY BEFORE SURGERY, INSTILL 1 DROP IN RIGHT EYE DAILY  1  
 tiZANidine (ZANAFLEX) 2 mg tablet TAKE 1 TABLET AT BEDTIME AS NEEDED FOR MUSCLE SPASM.  15 Tab 0  
  alendronate (FOSAMAX) 70 mg tablet Take 1 Tab by mouth every seven (7) days. 4 Tab 11  
 PROAIR RESPICLICK 90 mcg/actuation aepb Take 1-2 Puffs by inhalation every four (4) hours. 1 Inhaler 11 Allergies Allergen Reactions  Advil Pm  [Ibuprofen-Diphenhydramine Cit] Other reaction(s): Adverse reaction to substance , Hives  Codeine Other (comments) Causes stomach pains Family History Problem Relation Age of Onset  Heart Disease Mother  Heart Disease Father  Diabetes Paternal Grandfather Social History Tobacco Use  Smoking status: Never Smoker  Smokeless tobacco: Never Used Substance Use Topics  Alcohol use: No  
  Alcohol/week: 0.0 oz Patient Active Problem List  
Diagnosis Code  Type 2 diabetes mellitus without complication, without long-term current use of insulin (HCC) E11.9  
 Essential hypertension, benign I10  
 Hyperlipidemia E78.5  Hypothyroidism E03.9  Allergic rhinitis, cause unspecified J30.9  Chronic back pain M54.9, G89.29  
 Osteoporosis M81.0  Fall at home [address] [place], Y92.009  Right shoulder pain M25.511  Facial bruising S00.83XA  CAP (community acquired pneumonia) J18.9  Sepsis ([facility] A41.9  Type 2 diabetes mellitus with nephropathy (HCC) E11.21 Depression Risk Factor Screening: PHQ over the last two weeks 10/17/2018 Little interest or pleasure in doing things Not at all Feeling down, depressed, irritable, or hopeless Not at all Total Score PHQ 2 0 Alcohol Risk Factor Screening: You do not drink alcohol or very rarely. Functional Ability and Level of Safety:  
Hearing Loss Hearing is good. Activities of Daily Living The home contains: no safety equipment. Patient does total self care Fall Risk Fall Risk Assessment, last 12 mths 10/17/2018 Able to walk? Yes Fall in past 12 months? No  
Fall with injury? -  
Number of falls in past 12 months - Fall Risk Score -  
 
 
 Abuse Screen Patient is not abused Cognitive Screening Evaluation of Cognitive Function: 
Has your family/caregiver stated any concerns about your memory: no 
Normal 
 
Patient Care Team  
Patient Care Team: 
Jazmín Gardner MD as PCP - General 
Lawayne Gitelman, RN as Ambulatory Care Navigator Assessment/Plan Education and counseling provided: 
Are appropriate based on today's review and evaluation ICD-10-CM ICD-9-CM 1. Medicare annual wellness visit, subsequent Z00.00 V70.0 Anticipatory guidance discussed. Immunizations reviewed  updated. 2. Chronic right shoulder pain M25.511 719.41 naproxen (NAPROSYN) 250 mg tablet G89.29 338.29   
3. Pure hypercholesterolemia E78.00 272.0 LIPID PANEL  
   CRP, HIGH SENSITIVITY METABOLIC PANEL, COMPREHENSIVE  
   CBC WITH AUTOMATED DIFF 4. Vitamin D deficiency E55.9 268.9 VITAMIN D, 25 HYDROXY 5. Type 2 diabetes mellitus without complication, without long-term current use of insulin (HCC) E11.9 250.00 HEMOGLOBIN A1C WITH EAG  
   METABOLIC PANEL, COMPREHENSIVE  
    DIABETES FOOT EXAM  
6. Corn or callus L84 700 REFERRAL TO PODIATRY I discussed with the patient that persistent use of Anacin and Naprosyn can be hard on her kidneys and also her stomach. She is on omeprazole, but strongly encouraged to take tylenol. However, she reports that Tylenol makes her sick. Even just taking 1-tablet can make her very dizzy and knock her out. She has been seen by orthopedics for the L-shoulder pain. They did 2-injections with minimal improvement. She is not a surgical candidate. She denies any abdominal pain or discomfort. Will observe, but suggested minimizing her use. She will return for fasting labs and will advise accordingly. I have discussed the diagnosis with the patient and the intended treatment plan as seen in the above orders.  The patient has received an after-visit summary and questions were answered concerning future plans. Asked to return should symptoms worsen or not improve with treatment. Any pending labs and studies will be relayed to patient when they become available. Pt verbalizes understanding of plan of care and denies further questions or concerns at this time. Health Maintenance Topic Date Due  Shingrix Vaccine Age 50> (1 of 2) Order given today  HEMOGLOBIN A1C Q6M  Ordered  EYE EXAM RETINAL OR DILATED Q1  03/29/2019  MICROALBUMIN Q1  04/19/2019  LIPID PANEL Q1  04/19/2019  
 FOOT EXAM Q1  10/17/2019  MEDICARE YEARLY EXAM  10/18/2019  GLAUCOMA SCREENING Q2Y  03/29/2020  
 DTaP/Tdap/Td series (2 - Td) 04/05/2027  Bone Densitometry (Dexa) Screening  Completed  Pneumococcal 65+ Low/Medium Risk  Completed  Influenza Age 5 to Adult  Completed Follow-up Disposition: 
Return in about 1 year (around 10/17/2019), or if symptoms worsen or fail to improve, for Every 6-months for chronic medical issues.

## 2018-10-17 NOTE — ACP (ADVANCE CARE PLANNING)
I verified that the patient has an up to date ACP. However, her daughter's name is misspelled. It is Randall Aristeo [employer] phone [phone]  She also would like to have her other daughter on the form and her name is  John Gibbs phone [phone]     They share proxy care with each other.  Priti Layla was present for the discussion and agreed.

## 2018-11-01 ENCOUNTER — HOSPITAL ENCOUNTER (OUTPATIENT)
Dept: LAB | Age: 83
Discharge: HOME | End: 2018-11-01
Payer: MEDICARE

## 2018-11-01 ENCOUNTER — LAB ONLY (OUTPATIENT)
Dept: FAMILY MEDICINE CLINIC | Age: 83
End: 2018-11-01

## 2018-11-01 DIAGNOSIS — E78.00 PURE HYPERCHOLESTEROLEMIA: ICD-10-CM

## 2018-11-01 PROCEDURE — 82306 VITAMIN D 25 HYDROXY: CPT

## 2018-11-01 PROCEDURE — 85025 COMPLETE CBC W/AUTO DIFF WBC: CPT

## 2018-11-01 PROCEDURE — 80053 COMPREHEN METABOLIC PANEL: CPT

## 2018-11-01 PROCEDURE — 80061 LIPID PANEL: CPT

## 2018-11-01 PROCEDURE — 83036 HEMOGLOBIN GLYCOSYLATED A1C: CPT

## 2018-11-01 PROCEDURE — 86141 C-REACTIVE PROTEIN HS: CPT

## 2018-11-01 PROCEDURE — 36415 COLL VENOUS BLD VENIPUNCTURE: CPT

## 2018-11-01 RX ORDER — ROSUVASTATIN CALCIUM 20 MG/1
TABLET, COATED ORAL
Qty: 90 TAB | Refills: 1 | Status: SHIPPED | OUTPATIENT
Start: 2018-11-01 | End: 2019-04-24 | Stop reason: SDUPTHER

## 2018-11-01 RX ORDER — LEVOTHYROXINE SODIUM 50 UG/1
TABLET ORAL
Qty: 90 TAB | Refills: 1 | Status: SHIPPED | OUTPATIENT
Start: 2018-11-01 | End: 2019-04-24 | Stop reason: SDUPTHER

## 2018-11-01 RX ORDER — LOSARTAN POTASSIUM 50 MG/1
TABLET ORAL
Qty: 90 TAB | Refills: 1 | Status: SHIPPED | OUTPATIENT
Start: 2018-11-01 | End: 2019-04-24 | Stop reason: SDUPTHER

## 2018-11-02 LAB
25(OH)D3+25(OH)D2 SERPL-MCNC: 45.6 NG/ML (ref 30–100)
ALBUMIN SERPL-MCNC: 4.1 G/DL (ref 3.2–4.6)
ALBUMIN/GLOB SERPL: 1.8 {RATIO} (ref 1.2–2.2)
ALP SERPL-CCNC: 60 IU/L (ref 39–117)
ALT SERPL-CCNC: 21 IU/L (ref 0–32)
AST SERPL-CCNC: 37 IU/L (ref 0–40)
BASOPHILS # BLD AUTO: 0.1 X10E3/UL (ref 0–0.2)
BASOPHILS NFR BLD AUTO: 1 %
BILIRUB SERPL-MCNC: 0.4 MG/DL (ref 0–1.2)
BUN SERPL-MCNC: 12 MG/DL (ref 10–36)
BUN/CREAT SERPL: 14 (ref 12–28)
CALCIUM SERPL-MCNC: 8.9 MG/DL (ref 8.7–10.3)
CHLORIDE SERPL-SCNC: 102 MMOL/L (ref 96–106)
CHOLEST SERPL-MCNC: 114 MG/DL (ref 100–199)
CO2 SERPL-SCNC: 25 MMOL/L (ref 20–29)
CREAT SERPL-MCNC: 0.87 MG/DL (ref 0.57–1)
CRP SERPL HS-MCNC: 0.53 MG/L (ref 0–3)
EOSINOPHIL # BLD AUTO: 0.1 X10E3/UL (ref 0–0.4)
EOSINOPHIL NFR BLD AUTO: 2 %
ERYTHROCYTE [DISTWIDTH] IN BLOOD BY AUTOMATED COUNT: 14.4 % (ref 12.3–15.4)
EST. AVERAGE GLUCOSE BLD GHB EST-MCNC: 120 MG/DL
GLOBULIN SER CALC-MCNC: 2.3 G/DL (ref 1.5–4.5)
GLUCOSE SERPL-MCNC: 94 MG/DL (ref 65–99)
HBA1C MFR BLD: 5.8 % (ref 4.8–5.6)
HCT VFR BLD AUTO: 45.1 % (ref 34–46.6)
HDLC SERPL-MCNC: 45 MG/DL
HGB BLD-MCNC: 14.7 G/DL (ref 11.1–15.9)
IMM GRANULOCYTES # BLD: 0 X10E3/UL (ref 0–0.1)
IMM GRANULOCYTES NFR BLD: 0 %
INTERPRETATION, 910389: NORMAL
INTERPRETATION: NORMAL
LDLC SERPL CALC-MCNC: 40 MG/DL (ref 0–99)
LYMPHOCYTES # BLD AUTO: 2 X10E3/UL (ref 0.7–3.1)
LYMPHOCYTES NFR BLD AUTO: 30 %
Lab: NORMAL
MCH RBC QN AUTO: 31.3 PG (ref 26.6–33)
MCHC RBC AUTO-ENTMCNC: 32.6 G/DL (ref 31.5–35.7)
MCV RBC AUTO: 96 FL (ref 79–97)
MONOCYTES # BLD AUTO: 0.4 X10E3/UL (ref 0.1–0.9)
MONOCYTES NFR BLD AUTO: 6 %
NEUTROPHILS # BLD AUTO: 4.1 X10E3/UL (ref 1.4–7)
NEUTROPHILS NFR BLD AUTO: 61 %
PDF IMAGE, 910387: NORMAL
PLATELET # BLD AUTO: 214 X10E3/UL (ref 150–379)
POTASSIUM SERPL-SCNC: 4.3 MMOL/L (ref 3.5–5.2)
PROT SERPL-MCNC: 6.4 G/DL (ref 6–8.5)
RBC # BLD AUTO: 4.69 X10E6/UL (ref 3.77–5.28)
SODIUM SERPL-SCNC: 143 MMOL/L (ref 134–144)
TRIGL SERPL-MCNC: 147 MG/DL (ref 0–149)
VLDLC SERPL CALC-MCNC: 29 MG/DL (ref 5–40)
WBC # BLD AUTO: 6.6 X10E3/UL (ref 3.4–10.8)

## 2019-03-12 ENCOUNTER — OFFICE VISIT (OUTPATIENT)
Dept: FAMILY MEDICINE CLINIC | Age: 84
End: 2019-03-12

## 2019-03-12 VITALS
WEIGHT: 129.2 LBS | OXYGEN SATURATION: 93 % | HEART RATE: 88 BPM | RESPIRATION RATE: 18 BRPM | SYSTOLIC BLOOD PRESSURE: 138 MMHG | BODY MASS INDEX: 22.89 KG/M2 | DIASTOLIC BLOOD PRESSURE: 76 MMHG | TEMPERATURE: 98 F | HEIGHT: 63 IN

## 2019-03-12 DIAGNOSIS — L98.9 SKIN LESIONS: Primary | ICD-10-CM

## 2019-03-12 DIAGNOSIS — R05.9 COUGH: ICD-10-CM

## 2019-03-12 RX ORDER — BENZONATATE 100 MG/1
100 CAPSULE ORAL
Qty: 21 CAP | Refills: 0 | Status: SHIPPED | OUTPATIENT
Start: 2019-03-12 | End: 2019-03-19

## 2019-03-12 NOTE — PROGRESS NOTES
Identified pt with two pt identifiers(name and ). Chief Complaint   Patient presents with    Skin Problem     right ear has place on it that looks bad - it has been there up to a year    Cold Symptoms        Health Maintenance Due   Topic    Shingrix Vaccine Age 50> (1 of 2)       Wt Readings from Last 3 Encounters:   19 129 lb 3.2 oz (58.6 kg)   10/17/18 130 lb (59 kg)   18 130 lb (59 kg)     Temp Readings from Last 3 Encounters:   19 98 °F (36.7 °C) (Oral)   10/17/18 97.6 °F (36.4 °C) (Oral)   18 98.1 °F (36.7 °C) (Oral)     BP Readings from Last 3 Encounters:   19 138/76   10/17/18 136/57   18 118/54     Pulse Readings from Last 3 Encounters:   19 88   10/17/18 79   18 83         Learning Assessment:  :     Learning Assessment 2014   PRIMARY LEARNER Patient   HIGHEST LEVEL OF EDUCATION - PRIMARY LEARNER  DID NOT GRADUATE HIGH SCHOOL   BARRIERS PRIMARY LEARNER NONE   CO-LEARNER CAREGIVER No   PRIMARY LANGUAGE ENGLISH   LEARNER PREFERENCE PRIMARY LISTENING   ANSWERED BY patient    RELATIONSHIP SELF       Depression Screening:  :     3 most recent PHQ Screens 3/12/2019   Little interest or pleasure in doing things Not at all   Feeling down, depressed, irritable, or hopeless Several days   Total Score PHQ 2 1       Fall Risk Assessment:  :     Fall Risk Assessment, last 12 mths 3/12/2019   Able to walk? Yes   Fall in past 12 months? No   Fall with injury? -   Number of falls in past 12 months -   Fall Risk Score -       Abuse Screening:  :     Abuse Screening Questionnaire 3/12/2019 10/17/2018 2018 2017 2014   Do you ever feel afraid of your partner? N N N N N   Are you in a relationship with someone who physically or mentally threatens you? N N N N N   Is it safe for you to go home?  Y Y Y Y Y       Coordination of Care Questionnaire:  :     1) Have you been to an emergency room, urgent care clinic since your last visit? no   Hospitalized since your last visit? no             2) Have you seen or consulted any other health care providers outside of [address] [place] [address] since your last visit? no  (Include any pap smears or colon screenings in this section.)    3) Do you have an Advance Directive on file? yes  Are you interested in receiving information about Advance Directives? no    Patient is accompanied by daughter I have received verbal consent from Rosemary Higgins to discuss any/all medical information while they are present in the room. Reviewed record in preparation for visit and have obtained necessary documentation. Medication reconciliation up to date and corrected with patient at this time.

## 2019-03-13 NOTE — PROGRESS NOTES
Chief Complaint:  Chief Complaint   Patient presents with    Skin Problem     right ear has place on it that looks bad - it has been there up to a year    Cold Symptoms       History of Present Illness:  She is a 80 y.o. female who presents with her daughter and also c/o a lesion on the tip of the R-ear that has been enlarging over the past year. She has picked at it and it bleeds, but never fully goes away. She has not been seen routinely by dermatologist. She otherwise has continued multiple chronic joint pain from OA. She also has a mild URI with persistent cough today. Non productive. No fever. Reviewed PmHx, RxHx, FmHx, SocHx, AllgHx and updated and dated in the chart. Patient Active Problem List    Diagnosis    Type 2 diabetes mellitus with nephropathy ([address]    CAP (community acquired pneumonia)    [facility]    Open angle with borderline findings and low glaucoma risk in both eyes    Controlled type 2 diabetes mellitus without complication ([address]    Fall at home    Right shoulder pain    Facial bruising    Osteoporosis    Chronic back pain    Type 2 diabetes mellitus without complication, without long-term current use of insulin ([facility])    Essential hypertension, benign    Hyperlipidemia    Hypothyroidism    Allergic rhinitis, cause unspecified       Review of Systems - negative except as listed above in the HPI    Objective:     Vitals:    03/12/19 1555   BP: 138/76   Pulse: 88   Resp: 18   Temp: 98 °F (36.7 °C)   TempSrc: Oral   SpO2: 93%   Weight: 129 lb 3.2 oz (58.6 kg)   Height: 5' 3\" (1.6 m)     Physical Examination:   Physical Exam   HENT:   Head:       Ears:    Cardiovascular: Normal rate and regular rhythm. Nursing note and vitals reviewed. Assessment/ Plan:   Diagnoses and all orders for this visit:    1. Skin lesions  -     REFERRAL TO DERMATOLOGY    2. Cough  -     benzonatate (TESSALON PERLES) 100 mg capsule;  Take 1 Cap by mouth three (3) times daily as needed for Cough for up to 7 days. I have discussed the diagnosis with the patient and the intended treatment plan as seen in the above orders. The patient has received an after-visit summary and questions were answered concerning future plans. Asked to return should symptoms worsen or not improve with treatment. Any pending labs and studies will be relayed to patient when they become available. Pt verbalizes understanding of plan of care and denies further questions or concerns at this time. Follow-up Disposition:  Return if symptoms worsen or fail to improve.     Sawyer Hayden MD

## 2019-04-24 DIAGNOSIS — E78.00 PURE HYPERCHOLESTEROLEMIA: ICD-10-CM

## 2019-04-25 RX ORDER — ROSUVASTATIN CALCIUM 20 MG/1
TABLET, COATED ORAL
Qty: 90 TAB | Refills: 1 | Status: SHIPPED | OUTPATIENT
Start: 2019-04-25 | End: 2019-11-08 | Stop reason: SDUPTHER

## 2019-04-25 RX ORDER — LEVOTHYROXINE SODIUM 50 UG/1
TABLET ORAL
Qty: 90 TAB | Refills: 1 | Status: SHIPPED | OUTPATIENT
Start: 2019-04-25 | End: 2019-11-08 | Stop reason: SDUPTHER

## 2019-04-25 RX ORDER — LOSARTAN POTASSIUM 50 MG/1
TABLET ORAL
Qty: 90 TAB | Refills: 1 | Status: SHIPPED | OUTPATIENT
Start: 2019-04-25 | End: 2019-11-08 | Stop reason: SDUPTHER

## 2019-05-28 RX ORDER — OMEPRAZOLE 20 MG/1
40 CAPSULE, DELAYED RELEASE ORAL DAILY
Qty: 60 CAP | Refills: 5 | Status: SHIPPED | OUTPATIENT
Start: 2019-05-28 | End: 2020-04-28

## 2019-11-08 DIAGNOSIS — E78.00 PURE HYPERCHOLESTEROLEMIA: ICD-10-CM

## 2019-11-08 RX ORDER — LEVOTHYROXINE SODIUM 50 UG/1
TABLET ORAL
Qty: 90 TAB | Refills: 1 | Status: SHIPPED | OUTPATIENT
Start: 2019-11-08 | End: 2020-04-27

## 2019-11-08 RX ORDER — ROSUVASTATIN CALCIUM 20 MG/1
TABLET, COATED ORAL
Qty: 90 TAB | Refills: 1 | Status: SHIPPED | OUTPATIENT
Start: 2019-11-08 | End: 2020-04-27

## 2019-11-08 RX ORDER — LOSARTAN POTASSIUM 50 MG/1
TABLET ORAL
Qty: 90 TAB | Refills: 1 | Status: SHIPPED | OUTPATIENT
Start: 2019-11-08 | End: 2020-04-27

## 2020-02-11 ENCOUNTER — HOSPITAL ENCOUNTER (OUTPATIENT)
Dept: LAB | Age: 85
Discharge: HOME | End: 2020-02-11

## 2020-02-11 ENCOUNTER — OFFICE VISIT (OUTPATIENT)
Dept: FAMILY MEDICINE CLINIC | Age: 85
End: 2020-02-11

## 2020-02-11 VITALS
WEIGHT: 128 LBS | RESPIRATION RATE: 20 BRPM | HEART RATE: 102 BPM | DIASTOLIC BLOOD PRESSURE: 70 MMHG | OXYGEN SATURATION: 99 % | HEIGHT: 63 IN | SYSTOLIC BLOOD PRESSURE: 124 MMHG | BODY MASS INDEX: 22.68 KG/M2 | TEMPERATURE: 97.5 F

## 2020-02-11 DIAGNOSIS — E11.21 TYPE 2 DIABETES MELLITUS WITH NEPHROPATHY (HCC): ICD-10-CM

## 2020-02-11 DIAGNOSIS — Z00.00 MEDICARE ANNUAL WELLNESS VISIT, SUBSEQUENT: Primary | ICD-10-CM

## 2020-02-11 DIAGNOSIS — E55.9 VITAMIN D DEFICIENCY: ICD-10-CM

## 2020-02-11 DIAGNOSIS — Z00.00 ROUTINE CHECK-UP: ICD-10-CM

## 2020-02-11 DIAGNOSIS — E11.9 TYPE 2 DIABETES MELLITUS WITHOUT COMPLICATION, WITHOUT LONG-TERM CURRENT USE OF INSULIN (HCC): ICD-10-CM

## 2020-02-11 DIAGNOSIS — E11.9 ENCOUNTER FOR COMPREHENSIVE DIABETIC FOOT EXAMINATION, TYPE 2 DIABETES MELLITUS (HCC): ICD-10-CM

## 2020-02-11 DIAGNOSIS — E03.9 HYPOTHYROIDISM, UNSPECIFIED TYPE: ICD-10-CM

## 2020-02-11 DIAGNOSIS — E78.00 PURE HYPERCHOLESTEROLEMIA: ICD-10-CM

## 2020-02-11 PROBLEM — A41.9 SEPSIS (HCC): Status: RESOLVED | Noted: 2017-08-23 | Resolved: 2020-02-11

## 2020-02-11 LAB
ALBUMIN UR QL STRIP: 10 MG/L
BILIRUB UR QL STRIP: NEGATIVE
CREATININE, URINE POC: 200 MG/DL
GLUCOSE UR-MCNC: NEGATIVE MG/DL
KETONES P FAST UR STRIP-MCNC: NEGATIVE MG/DL
MICROALBUMIN/CREAT RATIO POC: <30 MG/G
PH UR STRIP: 5.5 [PH] (ref 4.6–8)
PROT UR QL STRIP: NEGATIVE
SP GR UR STRIP: 1.01 (ref 1–1.03)
UA UROBILINOGEN AMB POC: NORMAL (ref 0.2–1)
URINALYSIS CLARITY POC: CLEAR
URINALYSIS COLOR POC: YELLOW
URINE BLOOD POC: NORMAL
URINE LEUKOCYTES POC: NEGATIVE
URINE NITRITES POC: NEGATIVE

## 2020-02-11 NOTE — PATIENT INSTRUCTIONS
Well Visit, Over 72: Care Instructions Your Care Instructions Physical exams can help you stay healthy. Your doctor has checked your overall health and may have suggested ways to take good care of yourself. He or she also may have recommended tests. At home, you can help prevent illness with healthy eating, regular exercise, and other steps. Follow-up care is a key part of your treatment and safety. Be sure to make and go to all appointments, and call your doctor if you are having problems. It's also a good idea to know your test results and keep a list of the medicines you take. How can you care for yourself at home? · Reach and stay at a healthy weight. This will lower your risk for many problems, such as obesity, diabetes, heart disease, and high blood pressure. · Get at least 30 minutes of exercise on most days of the week. Walking is a good choice. You also may want to do other activities, such as running, swimming, cycling, or playing tennis or team sports. · Do not smoke. Smoking can make health problems worse. If you need help quitting, talk to your doctor about stop-smoking programs and medicines. These can increase your chances of quitting for good. · Protect your skin from too much sun. When you're outdoors from 10 a.m. to 4 p.m., stay in the shade or cover up with clothing and a hat with a wide brim. Wear sunglasses that block UV rays. Even when it's cloudy, put broad-spectrum sunscreen (SPF 30 or higher) on any exposed skin. · See a dentist one or two times a year for checkups and to have your teeth cleaned. · Wear a seat belt in the car. Follow your doctor's advice about when to have certain tests. These tests can spot problems early. For men and women · Cholesterol. Your doctor will tell you how often to have this done based on your overall health and other things that can increase your risk for heart attack and stroke. · Blood pressure. Have your blood pressure checked during a routine doctor visit. Your doctor will tell you how often to check your blood pressure based on your age, your blood pressure results, and other factors. · Diabetes. Ask your doctor whether you should have tests for diabetes. · Vision. Experts recommend that you have yearly exams for glaucoma and other age-related eye problems. · Hearing. Tell your doctor if you notice any change in your hearing. You can have tests to find out how well you hear. · Colon cancer tests. Keep having colon cancer tests as your doctor recommends. You can have one of several types of tests. · Heart attack and stroke risk. At least every 4 to 6 years, you should have your risk for heart attack and stroke assessed. Your doctor uses factors such as your age, blood pressure, cholesterol, and whether you smoke or have diabetes to show what your risk for a heart attack or stroke is over the next 10 years. · Osteoporosis. Talk to your doctor about whether you should have a bone density test to find out whether you have thinning bones. Also ask your doctor about whether you should take calcium and vitamin D supplements. For women · Pap test and pelvic exam. You may no longer need a Pap test. Talk with your doctor about whether to stop or continue to have Pap tests. · Breast exam and mammogram. Ask how often you should have a mammogram, which is an X-ray of your breasts. A mammogram can spot breast cancer before it can be felt and when it is easiest to treat. · Thyroid disease. Talk to your doctor about whether to have your thyroid checked as part of a regular physical exam. Women have an increased chance of a thyroid problem. For men · Prostate exam. Talk to your doctor about whether you should have a blood test (called a PSA test) for prostate cancer.  Experts recommend that you discuss the benefits and risks of the test with your doctor before you decide whether to have this test. Some experts say that men ages 79 and older no longer need testing. · Abdominal aortic aneurysm. Ask your doctor whether you should have a test to check for an aneurysm. You may need a test if you ever smoked or if your parent, brother, sister, or child has had an aneurysm. When should you call for help? Watch closely for changes in your health, and be sure to contact your doctor if you have any problems or symptoms that concern you. Where can you learn more? Go to http://alix-conrad.info/. Enter K943 in the search box to learn more about \"Well Visit, Over 65: Care Instructions. \" Current as of: December 13, 2018 Content Version: 12.2 © 5349-5559 [employer]. Care instructions adapted under license by [employer] (which disclaims liability or warranty for this information). If you have questions about a medical condition or this instruction, always ask your healthcare professional. [place] [address] any warranty or liability for your use of this information.

## 2020-02-11 NOTE — PROGRESS NOTES
Chief Complaint:  Chief Complaint   Patient presents with    Annual Wellness Visit    Memory Loss     she had a event where she had a laspe     Medication Evaluation     she stopped taking prilosec twice a day     This is the Subsequent Medicare Annual Wellness Exam, performed 12 months or more after the Initial AWV or the last Subsequent AWV    I have reviewed the patient's medical history in detail and updated the computerized patient record. History   89F who presents for follow up and also AWV. She has been doing well since her last visit about 1-year ago. No ER visit or hospitalizations. Generally has been feeling well. Needs fasting labs today. Patient Active Problem List   Diagnosis Code    Type 2 diabetes mellitus without complication, without long-term current use of insulin ([facility] [address] E11.9    Essential hypertension, benign I10    Hyperlipidemia E78.5    Hypothyroidism E03.9    Allergic rhinitis, cause unspecified J30.9    Chronic back pain M54.9, G89.29    Osteoporosis M81.0    Fall at home [address] 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    Controlled type 2 diabetes mellitus without complication (HCC) K87.4     Past Medical History:   Diagnosis Date    Allergic rhinitis     Diabetes ([address]     Hypercholesterolemia     Hypertension     Thyroid disease       Past Surgical History:   Procedure Laterality Date    BREAST SURGERY PROCEDURE UNLISTED      HX CHOLECYSTECTOMY       Current Outpatient Medications   Medication Sig Dispense Refill    losartan (COZAAR) 50 mg tablet TAKE 1 TABLET BY MOUTH EVERY DAY 90 Tab 1    rosuvastatin (CRESTOR) 20 mg tablet TAKE 1 TABLET BY MOUTH EVERY DAY 90 Tab 1    levothyroxine (SYNTHROID) 50 mcg tablet TAKE 1 TABLET BY MOUTH EVERY DAY BEFORE BREAKFAST 90 Tab 1    loratadine (CLARITIN) 10 mg tablet Take 10 mg by mouth.  LACTOSE-REDUCED FOOD (PROTEIN NUTRITIONAL SHAKE PO) Take 1 Bottle by mouth daily.  cholecalciferol, vitamin d3, (VITAMIN D) 1,000 unit tablet Take 1,000 Units by mouth daily.  calcium carbonate (CALCIUM 500) 500 mg (1,250 mg) tablet Take 1,250 mg by mouth daily.  aspirin delayed-release 81 mg tablet take 81 mg by mouth daily.  omeprazole (PRILOSEC) 20 mg capsule Take 2 Caps by mouth daily for 30 days. [facility] 90 mcg/actuation aepb Take 1-2 Puffs by inhalation every four (4) hours. 1 Inhaler 11    naproxen (NAPROSYN) 250 mg tablet TAKE 1 TABLET BY MOUTH TWICE A DAY WITH MEALS FOR 30 DAYS 60 Tab 5    DUREZOL 0.05 % ophthalmic emulsion INSTILL 1 DROP IN RIGHT EYE AS DIRECTED  1    BESIVANCE 0.6 % drps ophthalmic suspension STARTING THE DAY BEFORE SURGERY, INSTILL 1 DROP IN RIGHT EYE TWICE A DAY  1    ILEVRO 0.3 % drps STARTING DAY BEFORE SURGERY, INSTILL 1 DROP IN RIGHT EYE DAILY  1    tiZANidine (ZANAFLEX) 2 mg tablet TAKE 1 TABLET AT BEDTIME AS NEEDED FOR MUSCLE SPASM. 15 Tab 0    alendronate (FOSAMAX) 70 mg tablet Take 1 Tab by mouth every seven (7) days. 4 Tab 11     Allergies   Allergen Reactions    Advil Pm  [Ibuprofen-Diphenhydramine Cit]      Other reaction(s):  Adverse reaction to substance , Hives    Codeine Other (comments)     Causes stomach pains       Family History   Problem Relation Age of Onset    Heart Disease Mother     Heart Disease Father     Diabetes Paternal Grandfather      Social History     Tobacco Use    Smoking status: Never Smoker    Smokeless tobacco: Never Used   Substance Use Topics    Alcohol use: No     Alcohol/week: 0.0 standard drinks       Depression Risk Factor Screening:     3 most recent PHQ Screens 2/11/2020   Little interest or pleasure in doing things Not at all   Feeling down, depressed, irritable, or hopeless Not at all   Total Score PHQ 2 0       Alcohol Risk Factor Screening:   Do you average 1 drink per night or more than 7 drinks a week:  No    On any one occasion in the past three months have you have had more than 3 drinks containing alcohol:  No      Functional Ability and Level of Safety:   Hearing: The patient wears hearing aids. Activities of Daily Living: The home contains: handrails and grab bars  Patient does total self care    Ambulation: with no difficulty    Fall Risk:  Fall Risk Assessment, last 12 mths 2/11/2020   Able to walk? Yes   Fall in past 12 months? No   Fall with injury? -   Number of falls in past 12 months -   Fall Risk Score -       Abuse Screen:  Patient is not abused    Cognitive Screening   Has your family/caregiver stated any concerns about your memory: yes - mild short term memory difficulties at times. Cognitive Screening: Normal - Clock Drawing Test    Patient Care Team   Patient Care Team:  Amanda Salcedo MD as PCP - General  Amanda Salcedo MD as PCP [facility]  Junior Juan Manuel RN as Ambulatory Care Manager    Assessment/Plan   Education and counseling provided:  Are appropriate based on today's review and evaluation  End-of-Life planning (with patient's consent)  Pneumococcal Vaccine  Influenza Vaccine  Bone mass measurement (DEXA)  Screening for glaucoma  Diabetes screening test    Diagnoses and all orders for this visit:    1. Medicare annual wellness visit, subsequent   Anticipatory guidance discussed. Immunizations reviewed   HM updated. 2. Routine check-up  -     AMB POC URINALYSIS DIP STICK AUTO W/O MICRO    3. Type 2 diabetes mellitus without complication, without long-term current use of insulin (HCC)  -     AMB POC URINE, MICROALBUMIN, SEMIQUANT (3 RESULTS)    4. Pure hypercholesterolemia    5. Vitamin D deficiency    6.  Type 2 diabetes mellitus with nephropathy [facility])  Assessment & Plan:  Stable, based on history, physical exam and review of pertinent labs, studies and medications; meds reconciled; continue current treatment plan. Key Antihyperglycemic Medications     Patient is on no antihyperglycemic meds. Other Key Diabetic Medications             losartan (COZAAR) 50 mg tablet (Taking) TAKE 1 TABLET BY MOUTH EVERY DAY    rosuvastatin (CRESTOR) 20 mg tablet (Taking) TAKE 1 TABLET BY MOUTH EVERY DAY        Lab Results   Component Value Date/Time    Hemoglobin A1c 5.8 11/01/2018 09:43 AM    Glucose 94 11/01/2018 09:43 AM    Creatinine 0.87 11/01/2018 09:43 AM    Cholesterol, total 114 11/01/2018 09:43 AM    HDL Cholesterol 45 11/01/2018 09:43 AM    LDL, calculated 40 11/01/2018 09:43 AM    Triglyceride 147 11/01/2018 09:43 AM     Diabetic Foot and Eye Exam HM Status   Topic Date Due    Diabetic Foot Care  11/16/2019    Eye Exam  10/04/2020     I have discussed the diagnosis with the patient and the intended treatment plan as seen in the above orders. The patient has received an after-visit summary and questions were answered concerning future plans. Asked to return should symptoms worsen or not improve with treatment. Any pending labs and studies will be relayed to patient when they become available. Pt verbalizes understanding of plan of care and denies further questions or concerns at this time. Follow-up and Dispositions    · Return in about 1 year (around 2/11/2021), or if symptoms worsen or fail to improve, for Follow up every 6-months. Marino Salcedo MD    Patient Instructions        Well Visit, Over 72: Care Instructions  Your Care Instructions    Physical exams can help you stay healthy. Your doctor has checked your overall health and may have suggested ways to take good care of yourself. He or she also may have recommended tests. At home, you can help prevent illness with healthy eating, regular exercise, and other steps. Follow-up care is a key part of your treatment and safety.  Be sure to make and go to all appointments, and call your doctor if you are having problems. It's also a good idea to know your test results and keep a list of the medicines you take. How can you care for yourself at home? · Reach and stay at a healthy weight. This will lower your risk for many problems, such as obesity, diabetes, heart disease, and high blood pressure. · Get at least 30 minutes of exercise on most days of the week. Walking is a good choice. You also may want to do other activities, such as running, swimming, cycling, or playing tennis or team sports. · Do not smoke. Smoking can make health problems worse. If you need help quitting, talk to your doctor about stop-smoking programs and medicines. These can increase your chances of quitting for good. · Protect your skin from too much sun. When you're outdoors from 10 a.m. to 4 p.m., stay in the shade or cover up with clothing and a hat with a wide brim. Wear sunglasses that block UV rays. Even when it's cloudy, put broad-spectrum sunscreen (SPF 30 or higher) on any exposed skin. · See a dentist one or two times a year for checkups and to have your teeth cleaned. · Wear a seat belt in the car. Follow your doctor's advice about when to have certain tests. These tests can spot problems early. For men and women  · Cholesterol. Your doctor will tell you how often to have this done based on your overall health and other things that can increase your risk for heart attack and stroke. · Blood pressure. Have your blood pressure checked during a routine doctor visit. Your doctor will tell you how often to check your blood pressure based on your age, your blood pressure results, and other factors. · Diabetes. Ask your doctor whether you should have tests for diabetes. · Vision. Experts recommend that you have yearly exams for glaucoma and other age-related eye problems. · Hearing. Tell your doctor if you notice any change in your hearing. You can have tests to find out how well you hear.   · Colon cancer tests. Keep having colon cancer tests as your doctor recommends. You can have one of several types of tests. · Heart attack and stroke risk. At least every 4 to 6 years, you should have your risk for heart attack and stroke assessed. Your doctor uses factors such as your age, blood pressure, cholesterol, and whether you smoke or have diabetes to show what your risk for a heart attack or stroke is over the next 10 years. · Osteoporosis. Talk to your doctor about whether you should have a bone density test to find out whether you have thinning bones. Also ask your doctor about whether you should take calcium and vitamin D supplements. For women  · Pap test and pelvic exam. You may no longer need a Pap test. Talk with your doctor about whether to stop or continue to have Pap tests. · Breast exam and mammogram. Ask how often you should have a mammogram, which is an X-ray of your breasts. A mammogram can spot breast cancer before it can be felt and when it is easiest to treat. · Thyroid disease. Talk to your doctor about whether to have your thyroid checked as part of a regular physical exam. Women have an increased chance of a thyroid problem. For men  · Prostate exam. Talk to your doctor about whether you should have a blood test (called a PSA test) for prostate cancer. Experts recommend that you discuss the benefits and risks of the test with your doctor before you decide whether to have this test. Some experts say that men ages 79 and older no longer need testing. · Abdominal aortic aneurysm. Ask your doctor whether you should have a test to check for an aneurysm. You may need a test if you ever smoked or if your parent, brother, sister, or child has had an aneurysm. When should you call for help? Watch closely for changes in your health, and be sure to contact your doctor if you have any problems or symptoms that concern you. Where can you learn more?   Go to http://alix-conrad.info/. Enter E101 in the search box to learn more about \"Well Visit, Over 65: Care Instructions. \"  Current as of: December 13, 2018  Content Version: 12.2  © 6909-5554 [employer]. Care instructions adapted under license by [employer] (which disclaims liability or warranty for this information). If you have questions about a medical condition or this instruction, always ask your healthcare professional. [address] any warranty or liability for your use of this information.

## 2020-02-11 NOTE — ASSESSMENT & PLAN NOTE
Stable, based on history, physical exam and review of pertinent labs, studies and medications; meds reconciled; continue current treatment plan. Key Antihyperglycemic Medications     Patient is on no antihyperglycemic meds.         Other Key Diabetic Medications             losartan (COZAAR) 50 mg tablet (Taking) TAKE 1 TABLET BY MOUTH EVERY DAY    rosuvastatin (CRESTOR) 20 mg tablet (Taking) TAKE 1 TABLET BY MOUTH EVERY DAY        Lab Results   Component Value Date/Time    Hemoglobin A1c 5.8 11/01/2018 09:43 AM    Glucose 94 11/01/2018 09:43 AM    Creatinine 0.87 11/01/2018 09:43 AM    Cholesterol, total 114 11/01/2018 09:43 AM    HDL Cholesterol 45 11/01/2018 09:43 AM    LDL, calculated 40 11/01/2018 09:43 AM    Triglyceride 147 11/01/2018 09:43 AM     Diabetic Foot and Eye Exam HM Status   Topic Date Due    Diabetic Foot Care  11/16/2019    Eye Exam  10/04/2020

## 2020-02-11 NOTE — PROGRESS NOTES
Identified pt with two pt identifiers(name and ). Chief Complaint   Patient presents with    Annual Wellness Visit    Memory Loss     she had a event where she had a laspe     Medication Evaluation     she stopped taking prilosec twice a day        Health Maintenance Due   Topic    Shingrix Vaccine Age 49> (1 of 2)    Pneumococcal 65+ years (2 of 2 - PPSV23)    MICROALBUMIN Q1     Medicare Yearly Exam     Lipid Screen     Foot Exam Q1        Wt Readings from Last 3 Encounters:   20 128 lb (58.1 kg)   19 129 lb 3.2 oz (58.6 kg)   10/17/18 130 lb (59 kg)     Temp Readings from Last 3 Encounters:   20 97.5 °F (36.4 °C) (Oral)   19 98 °F (36.7 °C) (Oral)   10/17/18 97.6 °F (36.4 °C) (Oral)     BP Readings from Last 3 Encounters:   20 124/70   19 138/76   10/17/18 136/57     Pulse Readings from Last 3 Encounters:   20 (!) 102   19 88   10/17/18 79         Learning Assessment:  :     Learning Assessment 2014   PRIMARY LEARNER Patient   HIGHEST LEVEL OF EDUCATION - PRIMARY LEARNER  DID NOT GRADUATE HIGH SCHOOL   BARRIERS PRIMARY LEARNER NONE   CO-LEARNER CAREGIVER No   PRIMARY LANGUAGE ENGLISH   LEARNER PREFERENCE PRIMARY LISTENING   ANSWERED BY patient    RELATIONSHIP SELF       Depression Screening:  :     3 most recent PHQ Screens 2020   Little interest or pleasure in doing things Not at all   Feeling down, depressed, irritable, or hopeless Not at all   Total Score PHQ 2 0       Fall Risk Assessment:  :     Fall Risk Assessment, last 12 mths 2020   Able to walk? Yes   Fall in past 12 months? No   Fall with injury? -   Number of falls in past 12 months -   Fall Risk Score -       Abuse Screening:  :     Abuse Screening Questionnaire 2020 3/12/2019 10/17/2018 2018 2017 2014   Do you ever feel afraid of your partner? N N N N N N   Are you in a relationship with someone who physically or mentally threatens you?  N N N N N N   Is it safe for you to go home? Y Y Y Y Y Y       Coordination of Care Questionnaire:  :     1) Have you been to an emergency room, urgent care clinic since your last visit? no   Hospitalized since your last visit? no             2) Have you seen or consulted any other health care providers outside of [address] [place] [address] since your last visit? yes  Dr Devante Hurst, Dr Peg Akhtar (Include any pap smears or colon screenings in this section.)    3) Do you have an Advance Directive on file? yes  Are you interested in receiving information about Advance Directives? no    Patient is accompanied by daughter I have received verbal consent from Russ Thomson to discuss any/all medical information while they are present in the room. Reviewed record in preparation for visit and have obtained necessary documentation. Medication reconciliation up to date and corrected with patient at this time.      Results for orders placed or performed in visit on 02/11/20   AMB POC URINALYSIS DIP STICK AUTO W/O MICRO   Result Value Ref Range    Color (UA POC) Yellow     Clarity (UA POC) Clear     Glucose (UA POC) Negative Negative    Bilirubin (UA POC) Negative Negative    Ketones (UA POC) Negative Negative    Specific gravity (UA POC) 1.015 1.001 - 1.035    Blood (UA POC) Trace Negative    pH (UA POC) 5.5 4.6 - 8.0    Protein (UA POC) Negative Negative    Urobilinogen (UA POC) 0.2 mg/dL 0.2 - 1    Nitrites (UA POC) Negative Negative    Leukocyte esterase (UA POC) Negative Negative   AMB POC URINE, MICROALBUMIN, SEMIQUANT (3 RESULTS)   Result Value Ref Range    ALBUMIN, URINE POC 10 Negative mg/L    CREATININE, URINE  mg/dL    Microalbumin/creat ratio (POC) <30 <30 MG/G

## 2020-02-13 LAB — TSH SERPL-ACNC: 0.61 UIU/ML (ref 0.45–4.5)

## 2020-02-15 NOTE — PROGRESS NOTES
Please let patient know that her thyroid function was normal.   No need for any changes in her medications. She should return as needed for annual wellness visit and every 6 months for follow up.

## 2020-04-26 DIAGNOSIS — E78.00 PURE HYPERCHOLESTEROLEMIA: ICD-10-CM

## 2020-04-27 RX ORDER — ROSUVASTATIN CALCIUM 20 MG/1
TABLET, COATED ORAL
Qty: 90 TAB | Refills: 1 | Status: SHIPPED | OUTPATIENT
Start: 2020-04-27 | End: 2020-10-23

## 2020-04-27 RX ORDER — LOSARTAN POTASSIUM 50 MG/1
TABLET ORAL
Qty: 90 TAB | Refills: 1 | Status: SHIPPED | OUTPATIENT
Start: 2020-04-27 | End: 2020-10-23

## 2020-04-27 RX ORDER — LEVOTHYROXINE SODIUM 50 UG/1
TABLET ORAL
Qty: 90 TAB | Refills: 1 | Status: SHIPPED | OUTPATIENT
Start: 2020-04-27 | End: 2020-10-23

## 2020-04-28 RX ORDER — OMEPRAZOLE 20 MG/1
CAPSULE, DELAYED RELEASE ORAL
Qty: 180 CAP | Refills: 1 | Status: SHIPPED | OUTPATIENT
Start: 2020-04-28 | End: 2021-03-17

## 2020-10-23 DIAGNOSIS — E78.00 PURE HYPERCHOLESTEROLEMIA: ICD-10-CM

## 2020-10-23 RX ORDER — LEVOTHYROXINE SODIUM 50 UG/1
TABLET ORAL
Qty: 90 TAB | Refills: 1 | Status: SHIPPED | OUTPATIENT
Start: 2020-10-23 | End: 2021-04-23

## 2020-10-23 RX ORDER — ROSUVASTATIN CALCIUM 20 MG/1
TABLET, COATED ORAL
Qty: 90 TAB | Refills: 1 | Status: SHIPPED | OUTPATIENT
Start: 2020-10-23 | End: 2021-04-23

## 2020-10-23 RX ORDER — LOSARTAN POTASSIUM 50 MG/1
TABLET ORAL
Qty: 90 TAB | Refills: 1 | Status: SHIPPED | OUTPATIENT
Start: 2020-10-23 | End: 2021-04-23

## 2021-02-01 ENCOUNTER — HOSPITAL ENCOUNTER (OUTPATIENT)
Dept: GENERAL RADIOLOGY | Age: 86
Discharge: HOME | End: 2021-02-01
Attending: INTERNAL MEDICINE
Payer: MEDICARE

## 2021-02-01 ENCOUNTER — OFFICE VISIT (OUTPATIENT)
Dept: FAMILY MEDICINE CLINIC | Age: 86
End: 2021-02-01
Payer: MEDICARE

## 2021-02-01 VITALS
HEART RATE: 72 BPM | DIASTOLIC BLOOD PRESSURE: 64 MMHG | TEMPERATURE: 98.5 F | BODY MASS INDEX: 24.27 KG/M2 | WEIGHT: 137 LBS | RESPIRATION RATE: 22 BRPM | HEIGHT: 63 IN | SYSTOLIC BLOOD PRESSURE: 122 MMHG | OXYGEN SATURATION: 97 %

## 2021-02-01 DIAGNOSIS — M54.42 ACUTE BILATERAL LOW BACK PAIN WITH BILATERAL SCIATICA: ICD-10-CM

## 2021-02-01 DIAGNOSIS — E78.00 PURE HYPERCHOLESTEROLEMIA: ICD-10-CM

## 2021-02-01 DIAGNOSIS — M54.41 ACUTE BILATERAL LOW BACK PAIN WITH BILATERAL SCIATICA: Primary | ICD-10-CM

## 2021-02-01 DIAGNOSIS — E11.9 TYPE 2 DIABETES MELLITUS WITHOUT COMPLICATION, WITHOUT LONG-TERM CURRENT USE OF INSULIN (HCC): ICD-10-CM

## 2021-02-01 DIAGNOSIS — E55.9 VITAMIN D DEFICIENCY: ICD-10-CM

## 2021-02-01 DIAGNOSIS — Z13.820 SCREENING FOR OSTEOPOROSIS: ICD-10-CM

## 2021-02-01 DIAGNOSIS — Z78.0 POST-MENOPAUSE: ICD-10-CM

## 2021-02-01 DIAGNOSIS — M54.42 ACUTE BILATERAL LOW BACK PAIN WITH BILATERAL SCIATICA: Primary | ICD-10-CM

## 2021-02-01 DIAGNOSIS — M54.41 ACUTE BILATERAL LOW BACK PAIN WITH BILATERAL SCIATICA: ICD-10-CM

## 2021-02-01 PROBLEM — E11.21 TYPE 2 DIABETES MELLITUS WITH NEPHROPATHY (HCC): Status: RESOLVED | Noted: 2018-02-07 | Resolved: 2021-02-01

## 2021-02-01 PROCEDURE — G8427 DOCREV CUR MEDS BY ELIG CLIN: HCPCS | Performed by: INTERNAL MEDICINE

## 2021-02-01 PROCEDURE — G8536 NO DOC ELDER MAL SCRN: HCPCS | Performed by: INTERNAL MEDICINE

## 2021-02-01 PROCEDURE — G0463 HOSPITAL OUTPT CLINIC VISIT: HCPCS | Performed by: INTERNAL MEDICINE

## 2021-02-01 PROCEDURE — G8420 CALC BMI NORM PARAMETERS: HCPCS | Performed by: INTERNAL MEDICINE

## 2021-02-01 PROCEDURE — 72100 X-RAY EXAM L-S SPINE 2/3 VWS: CPT

## 2021-02-01 PROCEDURE — 1090F PRES/ABSN URINE INCON ASSESS: CPT | Performed by: INTERNAL MEDICINE

## 2021-02-01 PROCEDURE — 1101F PT FALLS ASSESS-DOCD LE1/YR: CPT | Performed by: INTERNAL MEDICINE

## 2021-02-01 PROCEDURE — 99213 OFFICE O/P EST LOW 20 MIN: CPT | Performed by: INTERNAL MEDICINE

## 2021-02-01 PROCEDURE — G8510 SCR DEP NEG, NO PLAN REQD: HCPCS | Performed by: INTERNAL MEDICINE

## 2021-02-01 RX ORDER — TRAMADOL HYDROCHLORIDE 50 MG/1
50 TABLET ORAL
Qty: 7 TAB | Refills: 0 | Status: SHIPPED | OUTPATIENT
Start: 2021-02-01 | End: 2021-02-08

## 2021-02-01 NOTE — PROGRESS NOTES
Chief Complaint   Patient presents with    Back Pain     couldn't stand streight- started saturday 11/30/21- lower back and goes down rt leg        Subjective:   April Dakota is a 80 y.o. female who presents today with her daughter for routine follow up. She is due for her AWV in about 2-weeks. However, she is here today for fasting labs and also, she is concerned about worsening lower back pain. She had an episode in November where the pain was so intense, she could not straighten up. She tried Arthritis strength Tylenol, Anacin, ASA and nothing worked. Her daughter gave her a 1/2 tablet of hydrocodone which really helped and she was able to relax and sleep. Since the, the Tylenol has been working [employer]. She is requesting to have something on hand for severe back pain in the future. She does not use medications daily. We discussed also seeing orthopedist if necessary. She is probably not a good surgical candidate due to her age. Her Diabetes has been stable. Will need fasting labs today and recheck of HBA1C. Diabetic report card is noted below. Diabetic Report Card  Diabetic Goals:  HgA1C <7,  LDL cholesterol <100, Blood pressure <140/80.       Lab Results   Component Value Date/Time    Hemoglobin A1c 5.8 (H) 11/01/2018 09:43 AM    Hemoglobin A1c 5.7 (H) 04/19/2018 10:31 AM    Hemoglobin A1c 5.6 09/12/2017 11:16 AM    Hemoglobin A1C 5.9 07/14/2009 11:12 AM     Lab Results   Component Value Date/Time    Cholesterol, total 114 11/01/2018 09:43 AM    HDL Cholesterol 45 11/01/2018 09:43 AM    LDL, calculated 40 11/01/2018 09:43 AM    VLDL, calculated 29 11/01/2018 09:43 AM    Triglyceride 147 11/01/2018 09:43 AM    CHOL/HDL Ratio 3.8 08/11/2010 09:10 AM     Lab Results   Component Value Date/Time    Microalbumin/Creat ratio (mg/g creat) 9 08/11/2010 09:05 AM    Microalb/Creat ratio (ug/mg creat.) 6.0 04/07/2017 09:57 AM    Microalbumin,urine random 1.05 08/11/2010 09:05 AM       I also recommended yearly eye exams and daily foot care. Patient Active Problem List    Diagnosis Date Noted    Type 2 diabetes mellitus with nephropathy ([facility] 02/07/2018    CAP (community acquired pneumonia) 08/23/2017    Open angle with borderline findings and low glaucoma risk in both eyes 06/06/2016    Controlled type 2 diabetes mellitus without complication ([facility] 28/21/7922    Fall at home 02/23/2015    Right shoulder pain 02/23/2015    Facial bruising 02/23/2015    Osteoporosis 09/19/2012    Chronic back pain 04/13/2012    Type 2 diabetes mellitus without complication, without long-term current use of insulin ([facility] 07/14/2009    Essential hypertension, benign 07/14/2009    Hyperlipidemia 07/14/2009    Hypothyroidism 07/14/2009    Allergic rhinitis, cause unspecified 07/14/2009         Current Outpatient Medications   Medication Sig Dispense Refill    levothyroxine (SYNTHROID) 50 mcg tablet TAKE 1 TABLET BY MOUTH EVERY DAY BEFORE BREAKFAST 90 Tab 1    losartan (COZAAR) 50 mg tablet TAKE 1 TABLET BY MOUTH EVERY DAY 90 Tab 1    rosuvastatin (CRESTOR) 20 mg tablet TAKE 1 TABLET BY MOUTH EVERY DAY 90 Tab 1    omeprazole (PRILOSEC) 20 mg capsule TAKE 2 CAPSULES BY MOUTH EVERY  Cap 1    PROAIR RESPICLICK 90 mcg/actuation aepb Take 1-2 Puffs by inhalation every four (4) hours. 1 Inhaler 11    loratadine (CLARITIN) 10 mg tablet Take 10 mg by mouth.  LACTOSE-REDUCED FOOD (PROTEIN NUTRITIONAL SHAKE PO) Take 1 Bottle by mouth daily.  cholecalciferol, vitamin d3, (VITAMIN D) 1,000 unit tablet Take 1,000 Units by mouth daily.  calcium carbonate (CALCIUM 500) 500 mg (1,250 mg) tablet Take 1,250 mg by mouth daily.  aspirin delayed-release 81 mg tablet take 81 mg by mouth daily.       naproxen (NAPROSYN) 250 mg tablet TAKE 1 TABLET BY MOUTH TWICE A DAY WITH MEALS FOR 30 DAYS 60 Tab 5    DUREZOL 0.05 % ophthalmic emulsion INSTILL 1 DROP IN RIGHT EYE AS DIRECTED  1    BESIVANCE 0.6 % drps ophthalmic suspension STARTING THE DAY BEFORE SURGERY, INSTILL 1 DROP IN RIGHT EYE TWICE A DAY  1    ILEVRO 0.3 % drps STARTING DAY BEFORE SURGERY, INSTILL 1 DROP IN RIGHT EYE DAILY  1    tiZANidine (ZANAFLEX) 2 mg tablet TAKE 1 TABLET AT BEDTIME AS NEEDED FOR MUSCLE SPASM. 15 Tab 0    alendronate (FOSAMAX) 70 mg tablet Take 1 Tab by mouth every seven (7) days. 4 Tab 11         Allergies   Allergen Reactions    Advil Pm  [Ibuprofen-Diphenhydramine Cit]      Other reaction(s): Adverse reaction to substance , Hives    Codeine Other (comments)     Causes stomach pains         Past Medical History:   Diagnosis Date    Allergic rhinitis     Diabetes ([address]     Hypercholesterolemia     Hypertension     Thyroid disease          Past Surgical History:   Procedure Laterality Date    HX CHOLECYSTECTOMY      OR BREAST SURGERY PROCEDURE UNLISTED           Family History   Problem Relation Age of Onset    Heart Disease Mother     Heart Disease Father     Diabetes Paternal Grandfather          Social History     Tobacco Use    Smoking status: Never Smoker    Smokeless tobacco: Never Used   Substance Use Topics    Alcohol use: No     Alcohol/week: 0.0 standard drinks          Review of Systems    Pertinent items are noted in HPI. Objective:     Vitals:    02/01/21 1343   BP: 122/64   Pulse: 72   Resp: 22   Temp: 98.5 °F (36.9 °C)   TempSrc: Oral   SpO2: 97%   Weight: 137 lb (62.1 kg)   Height: 5' 3\" (1.6 m)       General: alert, cooperative, no distress   Mental  status: normal mood, behavior, speech, dress, motor activity, and thought processes, able to follow commands   HENT: NCAT   Neck: no visualized mass   Resp: no respiratory distress   Neuro: no gross deficits   Skin: no discoloration or lesions of concern on visible areas   Psychiatric: normal affect, consistent with stated mood, no evidence of hallucinations           Assessment/ Plan:   Diagnoses and all orders for this visit:    1.  Acute bilateral low back pain with bilateral sciatica  Comments:  acute on chronic lower back pain. Orders:  -     XR SPINE LUMB 2 OR 3 V; Future  -     traMADoL (ULTRAM) 50 mg tablet; Take 1 Tab by mouth every six (6) hours as needed for Pain for up to 3 days. Max Daily Amount: 200 mg.    2. Type 2 diabetes mellitus without complication, without long-term current use of insulin (HCC)  -     METABOLIC PANEL, COMPREHENSIVE; Future  -     CBC WITH AUTOMATED DIFF; Future  -     HEMOGLOBIN A1C WITH EAG; Future  -     MICROALBUMIN, UR, RAND W/ MICROALB/CREAT RATIO; Future    3. Pure hypercholesterolemia  -     METABOLIC PANEL, COMPREHENSIVE; Future  -     CBC WITH AUTOMATED DIFF; Future  -     LIPID PANEL; Future    4. Vitamin D deficiency  -     VITAMIN D, 25 HYDROXY; Future    5. Screening for osteoporosis  -     DEXA BONE DENSITY STUDY AXIAL; Future    6. Post-menopause  -     DEXA BONE DENSITY STUDY AXIAL; Future       I have discussed the diagnosis with the patient and the intended treatment plan as seen in the above orders. The patient has received an after-visit summary and questions were answered concerning future plans. Asked to return should symptoms worsen or not improve with treatment. Any pending labs and studies will be relayed to patient when they become available. Pt verbalizes understanding of plan of care and denies further questions or concerns at this time. Follow-up and Dispositions    · Return if symptoms worsen or fail to improve, for Follow up 6 months for chronic issues. Jessie Corbin MD    Patient Instructions          Diabetes Foot Health: Care Instructions  Your Care Instructions     When you have diabetes, your feet need extra care and attention. Diabetes can damage the nerve endings and blood vessels in your feet, making you less likely to notice when your feet are injured. Diabetes also limits your body's ability to fight infection and get blood to areas that need it.  If you get a minor foot injury, it could become an ulcer or a serious infection. With good foot care, you can prevent most of these problems. Caring for your feet can be quick and easy. Most of the care can be done when you are bathing or getting ready for bed. Follow-up care is a key part of your treatment and safety. Be sure to make and go to all appointments, and call your doctor if you are having problems. It's also a good idea to know your test results and keep a list of the medicines you take. How can you care for yourself at home? · Keep your blood sugar close to normal by watching what and how much you eat, monitoring blood sugar, taking medicines if prescribed, and getting regular exercise. · Do not smoke. Smoking affects blood flow and can make foot problems worse. If you need help quitting, talk to your doctor about stop-smoking programs and medicines. These can increase your chances of quitting for good. · Eat a diet that is low in fats. High fat intake can cause fat to build up in your blood vessels and decrease blood flow. · Inspect your feet daily for blisters, cuts, cracks, or sores. If you cannot see well, use a mirror or have someone help you. · Take care of your feet:  ? Wash your feet every day. Use warm (not hot) water. Check the water temperature with your wrists or other part of your body, not your feet. ? Dry your feet well. Pat them dry. Do not rub the skin on your feet too hard. Dry well between your toes. If the skin on your feet stays moist, bacteria or a fungus can grow, which can lead to infection. ? Keep your skin soft. Use moisturizing skin cream to keep the skin on your feet soft and prevent calluses and cracks. But do not put the cream between your toes, and stop using any cream that causes a rash. ? Clean underneath your toenails carefully. Do not use a sharp object to clean underneath your toenails. Use the blunt end of a nail file or other rounded tool.   ? Trim and file your toenails straight across to prevent ingrown toenails. Use a nail clipper, not scissors. Use an emery board to smooth the edges. · Change socks daily. Socks without seams are best, because seams often rub the feet. You can find socks for people with diabetes from specialty catalogs. · Look inside your shoes every day for things like gravel or torn linings, which could cause blisters or sores. · Buy shoes that fit well:  ? Look for shoes that have plenty of space around the toes. This helps prevent bunions and blisters. ? Try on shoes while wearing the kind of socks you will usually wear with the shoes. ? Avoid plastic shoes. They may rub your feet and cause blisters. Good shoes should be made of materials that are flexible and breathable, such as leather or cloth. ? Break in new shoes slowly by wearing them for no more than an hour a day for several days. Take extra time to check your feet for red areas, blisters, or other problems after you wear new shoes. · Do not go barefoot. Do not wear sandals, and do not wear shoes with very thin soles. Thin soles are easy to puncture. They also do not protect your feet from hot pavement or cold weather. · Have your doctor check your feet during each visit. If you have a foot problem, see your doctor. Do not try to treat an early foot problem at home. Home remedies or treatments that you can buy without a prescription (such as corn removers) can be harmful. · Always get early treatment for foot problems. A minor irritation can lead to a major problem if not properly cared for early. When should you call for help?    Call your doctor now or seek immediate medical care if:    · You have a foot sore, an ulcer or break in the skin that is not healing after 4 days, bleeding corns or calluses, or an ingrown toenail.     · You have blue or black areas, which can mean bruising or blood flow problems.     · You have peeling skin or tiny blisters between your toes or cracking or oozing of the skin.     · You have a fever for more than 24 hours and a foot sore.     · You have new numbness or tingling in your feet that does not go away after you move your feet or change positions.     · You have unexplained or unusual swelling of the foot or ankle. Watch closely for changes in your health, and be sure to contact your doctor if:    · You cannot do proper foot care. Where can you learn more? Go to http://www.gray.com/  Enter A739 in the search box to learn more about \"Diabetes Foot Health: Care Instructions. \"  Current as of: December 20, 2019               Content Version: 12.6  © 4150-7228 [email or web address]. Care instructions adapted under license by [employer] (which disclaims liability or warranty for this information). If you have questions about a medical condition or this instruction, always ask your healthcare professional. [place] [address] any warranty or liability for your use of this information. Back Pain: Care Instructions  Your Care Instructions     Back pain has many possible causes. It is often related to problems with muscles and ligaments of the back. It may also be related to problems with the nerves, discs, or bones of the back. Moving, lifting, standing, sitting, or sleeping in an awkward way can strain the back. Sometimes you don't notice the injury until later. Arthritis is another common cause of back pain. Although it may hurt a lot, back pain usually improves on its own within several weeks. Most people recover in 12 weeks or less. Using good home treatment and being careful not to stress your back can help you feel better sooner. Follow-up care is a key part of your treatment and safety. Be sure to make and go to all appointments, and call your doctor if you are having problems. It's also a good idea to know your test results and keep a list of the medicines you take.   How can you care for yourself at home? · Sit or lie in positions that are most comfortable and reduce your pain. Try one of these positions when you lie down:  ? Lie on your back with your knees bent and supported by large pillows. ? Lie on the floor with your legs on the seat of a sofa or chair. ? Lie on your side with your knees and hips bent and a pillow between your legs. ? Lie on your stomach if it does not make pain worse. · Do not sit up in bed, and avoid soft couches and twisted positions. Bed rest can help relieve pain at first, but it delays healing. Avoid bed rest after the first day of back pain. · Change positions every 30 minutes. If you must sit for long periods of time, take breaks from sitting. Get up and walk around, or lie in a comfortable position. · Try using a heating pad on a low or medium setting for 15 to 20 minutes every 2 or 3 hours. Try a warm shower in place of one session with the heating pad. · You can also try an ice pack for 10 to 15 minutes every 2 to 3 hours. Put a thin cloth between the ice pack and your skin. · Take pain medicines exactly as directed. ? If the doctor gave you a prescription medicine for pain, take it as prescribed. ? If you are not taking a prescription pain medicine, ask your doctor if you can take an over-the-counter medicine. · Take short walks several times a day. You can start with 5 to 10 minutes, 3 or 4 times a day, and work up to longer walks. Walk on level surfaces and avoid hills and stairs until your back is better. · Return to work and other activities as soon as you can. Continued rest without activity is usually not good for your back. · To prevent future back pain, do exercises to stretch and strengthen your back and stomach. Learn how to use good posture, safe lifting techniques, and proper body mechanics. When should you call for help?    Call your doctor now or seek immediate medical care if:    · You have new or worsening numbness in your legs.     · You have new or worsening weakness in your legs. (This could make it hard to stand up.)     · You lose control of your bladder or bowels. Watch closely for changes in your health, and be sure to contact your doctor if:    · You have a fever, lose weight, or don't feel well.     · You do not get better as expected. Where can you learn more? Go to http://www.farias.com/  Enter I594 in the search box to learn more about \"Back Pain: Care Instructions. \"  Current as of: March 2, 2020               Content Version: 12.6  © 7040-4934 [employer]. Care instructions adapted under license by [employer] (which disclaims liability or warranty for this information). If you have questions about a medical condition or this instruction, always ask your healthcare professional. [address] any warranty or liability for your use of this information.

## 2021-02-01 NOTE — PROGRESS NOTES
Identified pt with two pt identifiers(name and ). Chief Complaint   Patient presents with    Back Pain     couldn't stand streight- started 21- lower back and goes down rt leg         Health Maintenance Due   Topic    COVID-19 Vaccine (1 of 2)    Shingrix Vaccine Age 50> (1 of 2)    Pneumococcal 65+ years (2 of 2 - PPSV23)    Lipid Screen     GLAUCOMA SCREENING Q2Y     Eye Exam Retinal or Dilated     Medicare Yearly Exam     Foot Exam Q1     MICROALBUMIN Q1        Wt Readings from Last 3 Encounters:   21 137 lb (62.1 kg)   20 128 lb (58.1 kg)   19 129 lb 3.2 oz (58.6 kg)     Temp Readings from Last 3 Encounters:   21 98.5 °F (36.9 °C) (Oral)   20 97.5 °F (36.4 °C) (Oral)   19 98 °F (36.7 °C) (Oral)     BP Readings from Last 3 Encounters:   21 122/64   20 124/70   19 138/76     Pulse Readings from Last 3 Encounters:   21 72   20 (!) 102   19 88         Learning Assessment:  :     Learning Assessment 2014   PRIMARY LEARNER Patient   HIGHEST LEVEL OF EDUCATION - PRIMARY LEARNER  DID NOT GRADUATE HIGH SCHOOL   BARRIERS PRIMARY LEARNER NONE   CO-LEARNER CAREGIVER No   PRIMARY LANGUAGE ENGLISH   LEARNER PREFERENCE PRIMARY LISTENING   ANSWERED BY patient    RELATIONSHIP SELF       Depression Screening:  :     3 most recent PHQ Screens 2021   Little interest or pleasure in doing things Not at all   Feeling down, depressed, irritable, or hopeless Not at all   Total Score PHQ 2 0       Fall Risk Assessment:  :     Fall Risk Assessment, last 12 mths 2021   Able to walk? Yes   Fall in past 12 months? 0   Do you feel unsteady? 1   Are you worried about falling 0   Is the gait abnormal? 0   Number of falls in past 12 months -   Fall with injury? -       Abuse Screening:  :     Abuse Screening Questionnaire 2021 2020 3/12/2019 10/17/2018 2018 2017 2014   Do you ever feel afraid of your partner? N N N N N N N   Are you in a relationship with someone who physically or mentally threatens you? N N N N N N N   Is it safe for you to go home? Y Y Y Y Y Y Y       Coordination of Care Questionnaire:  :     1) Have you been to an emergency room, urgent care clinic since your last visit? no   Hospitalized since your last visit? no             2) Have you seen or consulted any other health care providers outside of [address] [place] [address] since your last visit? yes  [address] [facility] 9/2020 &     3) Do you have an Advance Directive on file? no  Are you interested in receiving information about Advance Directives? no    Patient is accompanied by daughter I have received verbal consent from Nicole Winters to discuss any/all medical information while they are present in the room. Reviewed record in preparation for visit and have obtained necessary documentation.

## 2021-02-01 NOTE — PATIENT INSTRUCTIONS
Diabetes Foot Health: Care Instructions Your Care Instructions When you have diabetes, your feet need extra care and attention. Diabetes can damage the nerve endings and blood vessels in your feet, making you less likely to notice when your feet are injured. Diabetes also limits your body's ability to fight infection and get blood to areas that need it. If you get a minor foot injury, it could become an ulcer or a serious infection. With good foot care, you can prevent most of these problems. Caring for your feet can be quick and easy. Most of the care can be done when you are bathing or getting ready for bed. Follow-up care is a key part of your treatment and safety. Be sure to make and go to all appointments, and call your doctor if you are having problems. It's also a good idea to know your test results and keep a list of the medicines you take. How can you care for yourself at home? · Keep your blood sugar close to normal by watching what and how much you eat, monitoring blood sugar, taking medicines if prescribed, and getting regular exercise. · Do not smoke. Smoking affects blood flow and can make foot problems worse. If you need help quitting, talk to your doctor about stop-smoking programs and medicines. These can increase your chances of quitting for good. · Eat a diet that is low in fats. High fat intake can cause fat to build up in your blood vessels and decrease blood flow. · Inspect your feet daily for blisters, cuts, cracks, or sores. If you cannot see well, use a mirror or have someone help you. · Take care of your feet: 
? Wash your feet every day. Use warm (not hot) water. Check the water temperature with your wrists or other part of your body, not your feet. ? Dry your feet well. Pat them dry. Do not rub the skin on your feet too hard. Dry well between your toes. If the skin on your feet stays moist, bacteria or a fungus can grow, which can lead to infection. ? Keep your skin soft. Use moisturizing skin cream to keep the skin on your feet soft and prevent calluses and cracks. But do not put the cream between your toes, and stop using any cream that causes a rash. 
? Clean underneath your toenails carefully. Do not use a sharp object to clean underneath your toenails. Use the blunt end of a nail file or other rounded tool. 
? Trim and file your toenails straight across to prevent ingrown toenails. Use a nail clipper, not scissors. Use an emery board to smooth the edges. 
· Change socks daily. Socks without seams are best, because seams often rub the feet. You can find socks for people with diabetes from specialty catalogs. 
· Look inside your shoes every day for things like gravel or torn linings, which could cause blisters or sores. 
· Buy shoes that fit well: 
? Look for shoes that have plenty of space around the toes. This helps prevent bunions and blisters. 
? Try on shoes while wearing the kind of socks you will usually wear with the shoes. 
? Avoid plastic shoes. They may rub your feet and cause blisters. Good shoes should be made of materials that are flexible and breathable, such as leather or cloth. 
? Break in new shoes slowly by wearing them for no more than an hour a day for several days. Take extra time to check your feet for red areas, blisters, or other problems after you wear new shoes. 
· Do not go barefoot. Do not wear sandals, and do not wear shoes with very thin soles. Thin soles are easy to puncture. They also do not protect your feet from hot pavement or cold weather. 
· Have your doctor check your feet during each visit. If you have a foot problem, see your doctor. Do not try to treat an early foot problem at home. Home remedies or treatments that you can buy without a prescription (such as corn removers) can be harmful. 
· Always get early treatment for foot problems. A minor irritation can lead to a major problem if not properly cared for early. 
 When should you call for help? Call your doctor now or seek immediate medical care if: 
  · You have a foot sore, an ulcer or break in the skin that is not healing after 4 days, bleeding corns or calluses, or an ingrown toenail.  
  · You have blue or black areas, which can mean bruising or blood flow problems.  
  · You have peeling skin or tiny blisters between your toes or cracking or oozing of the skin.  
  · You have a fever for more than 24 hours and a foot sore.  
  · You have new numbness or tingling in your feet that does not go away after you move your feet or change positions.  
  · You have unexplained or unusual swelling of the foot or ankle. Watch closely for changes in your health, and be sure to contact your doctor if: 
  · You cannot do proper foot care. Where can you learn more? Go to http://alix-conrad.info/ Enter A739 in the search box to learn more about \"Diabetes Foot Health: Care Instructions. \" Current as of: December 20, 2019               Content Version: 12.6 © 4999-3698 [employer]. Care instructions adapted under license by [employer] (which disclaims liability or warranty for this information). If you have questions about a medical condition or this instruction, always ask your healthcare professional. [address] any warranty or liability for your use of this information. Back Pain: Care Instructions Your Care Instructions Back pain has many possible causes. It is often related to problems with muscles and ligaments of the back. It may also be related to problems with the nerves, discs, or bones of the back. Moving, lifting, standing, sitting, or sleeping in an awkward way can strain the back. Sometimes you don't notice the injury until later. Arthritis is another common cause of back pain. Although it may hurt a lot, back pain usually improves on its own within several weeks. Most people recover in 12 weeks or less. Using good home treatment and being careful not to stress your back can help you feel better sooner. Follow-up care is a key part of your treatment and safety. Be sure to make and go to all appointments, and call your doctor if you are having problems. It's also a good idea to know your test results and keep a list of the medicines you take. How can you care for yourself at home? · Sit or lie in positions that are most comfortable and reduce your pain. Try one of these positions when you lie down: ? Lie on your back with your knees bent and supported by large pillows. ? Lie on the floor with your legs on the seat of a sofa or chair. ? Lie on your side with your knees and hips bent and a pillow between your legs. ? Lie on your stomach if it does not make pain worse. · Do not sit up in bed, and avoid soft couches and twisted positions. Bed rest can help relieve pain at first, but it delays healing. Avoid bed rest after the first day of back pain. · Change positions every 30 minutes. If you must sit for long periods of time, take breaks from sitting. Get up and walk around, or lie in a comfortable position. · Try using a heating pad on a low or medium setting for 15 to 20 minutes every 2 or 3 hours. Try a warm shower in place of one session with the heating pad. · You can also try an ice pack for 10 to 15 minutes every 2 to 3 hours. Put a thin cloth between the ice pack and your skin. · Take pain medicines exactly as directed. ? If the doctor gave you a prescription medicine for pain, take it as prescribed. ? If you are not taking a prescription pain medicine, ask your doctor if you can take an over-the-counter medicine. · Take short walks several times a day. You can start with 5 to 10 minutes, 3 or 4 times a day, and work up to longer walks. Walk on level surfaces and avoid hills and stairs until your back is better. · Return to work and other activities as soon as you can. Continued rest without activity is usually not good for your back. · To prevent future back pain, do exercises to stretch and strengthen your back and stomach. Learn how to use good posture, safe lifting techniques, and proper body mechanics. When should you call for help? Call your doctor now or seek immediate medical care if: 
  · You have new or worsening numbness in your legs.  
  · You have new or worsening weakness in your legs. (This could make it hard to stand up.)  
  · You lose control of your bladder or bowels. Watch closely for changes in your health, and be sure to contact your doctor if: 
  · You have a fever, lose weight, or don't feel well.  
  · You do not get better as expected. Where can you learn more? Go to http://www.gray.com/ Enter T796 in the search box to learn more about \"Back Pain: Care Instructions. \" Current as of: March 2, 2020               Content Version: 12.6 © 9426-8801 [employer]. Care instructions adapted under license by [employer] (which disclaims liability or warranty for this information). If you have questions about a medical condition or this instruction, always ask your healthcare professional. [place] [address] any warranty or liability for your use of this information.

## 2021-02-04 ENCOUNTER — TELEPHONE (OUTPATIENT)
Dept: FAMILY MEDICINE CLINIC | Age: 86
End: 2021-02-04

## 2021-02-04 NOTE — TELEPHONE ENCOUNTER
Daughter Harley Shrestha called wanting the x-ray results call her at [phone]. She wants to know what the next steps are for her mother and if she needs more or other medication. I told the daughter that Dr. Denny Agarwal will not be in the office until next week, and the daughter stated that she knows that Dr. Denny Agarwal is not In this week. Jesse Mcclure

## 2021-02-05 NOTE — TELEPHONE ENCOUNTER
Spoke to pt's daughter and relayed xray results with recommendations. Pt is not willing to do PT but daughters will do their best to get her to follow back directions in the after visit evan. Pt did run out of the Tramadol this am and would like some more if possible.

## 2021-02-05 NOTE — TELEPHONE ENCOUNTER
Called pt's daughter Doretha Hawthorne on 2/5/21 at 4:46pm and lm to call and schedule virtual when she would be with pt

## 2021-02-06 ENCOUNTER — TRANSCRIBE ORDER (OUTPATIENT)
Dept: FAMILY MEDICINE CLINIC | Age: 86
End: 2021-02-06

## 2021-02-06 DIAGNOSIS — M54.42 ACUTE BILATERAL LOW BACK PAIN WITH BILATERAL SCIATICA: ICD-10-CM

## 2021-02-06 DIAGNOSIS — M54.41 ACUTE BILATERAL LOW BACK PAIN WITH BILATERAL SCIATICA: ICD-10-CM

## 2021-02-08 RX ORDER — TRAMADOL HYDROCHLORIDE 50 MG/1
50 TABLET ORAL
Qty: 7 TAB | Refills: 0 | Status: SHIPPED | OUTPATIENT
Start: 2021-02-08 | End: 2021-02-08 | Stop reason: SDUPTHER

## 2021-02-08 RX ORDER — TRAMADOL HYDROCHLORIDE 50 MG/1
50 TABLET ORAL
Qty: 7 TAB | Refills: 0 | Status: SHIPPED | OUTPATIENT
Start: 2021-02-08 | End: 2021-05-19 | Stop reason: SDUPTHER

## 2021-02-08 NOTE — TELEPHONE ENCOUNTER
----- Message from Usama Kelly sent at 2/6/2021 10:19 AM EST -----  Regarding: Dr. Kelvin Bran Medication Refill    Caller (if not patient): Shadi Alexander       Relationship of caller (if not patient): Asha       Best contact number(s): [phone]      Name of medication and dosage if known: Tramadol       Is patient out of this medication (yes/no): Yes       Pharmacy name: [facility] listed in chart? (yes/no): Yes     Pharmacy phone number: [phone]      Details to clarify the request:      Usama Kelly

## 2021-03-17 RX ORDER — OMEPRAZOLE 20 MG/1
CAPSULE, DELAYED RELEASE ORAL
Qty: 180 CAP | Refills: 1 | Status: SHIPPED | OUTPATIENT
Start: 2021-03-17 | End: 2021-09-10

## 2021-04-15 ENCOUNTER — TELEPHONE (OUTPATIENT)
Dept: FAMILY MEDICINE CLINIC | Age: 86
End: 2021-04-15

## 2021-04-15 NOTE — TELEPHONE ENCOUNTER
----- Message from [facility] sent at 4/15/2021  9:59 AM EDT -----  Regarding: Dr. Kervin Soto  General Message/Vendor Calls    Caller's first and last name: Daughter      Reason for call: Needs to know who the foot doctor was Dr. Brito Seen referred her to in the past.       Callback required yes/no and why: yes      Best contact number(s): [phone] leave message if no answer      Details to clarify the request: n/a      [facility]

## 2021-04-23 DIAGNOSIS — E78.00 PURE HYPERCHOLESTEROLEMIA: ICD-10-CM

## 2021-04-23 RX ORDER — ROSUVASTATIN CALCIUM 20 MG/1
TABLET, COATED ORAL
Qty: 90 TAB | Refills: 1 | Status: SHIPPED | OUTPATIENT
Start: 2021-04-23 | End: 2021-11-01

## 2021-04-23 RX ORDER — LEVOTHYROXINE SODIUM 50 UG/1
TABLET ORAL
Qty: 90 TAB | Refills: 1 | Status: SHIPPED | OUTPATIENT
Start: 2021-04-23 | End: 2021-11-01

## 2021-04-23 RX ORDER — LOSARTAN POTASSIUM 50 MG/1
TABLET ORAL
Qty: 90 TAB | Refills: 1 | Status: SHIPPED | OUTPATIENT
Start: 2021-04-23 | End: 2021-11-01

## 2021-04-26 ENCOUNTER — HOSPITAL ENCOUNTER (OUTPATIENT)
Dept: MAMMOGRAPHY | Age: 86
Discharge: HOME | End: 2021-04-26
Attending: INTERNAL MEDICINE
Payer: MEDICARE

## 2021-04-26 DIAGNOSIS — Z78.0 POST-MENOPAUSE: ICD-10-CM

## 2021-04-26 DIAGNOSIS — Z13.820 SCREENING FOR OSTEOPOROSIS: ICD-10-CM

## 2021-04-26 PROCEDURE — 77080 DXA BONE DENSITY AXIAL: CPT

## 2021-05-05 NOTE — PROGRESS NOTES
Bone density shows continued osteoporosis. It is not changed since done 2-years ago. Would like to discuss treatment options and reinforce calcium and vitamin D. She is on Fosamax.    Thanks,   Dr. Yadira Miguel

## 2021-05-06 ENCOUNTER — TELEPHONE (OUTPATIENT)
Dept: FAMILY MEDICINE CLINIC | Age: 86
End: 2021-05-06

## 2021-05-06 NOTE — TELEPHONE ENCOUNTER
Spoke to pt and relayed message. She understood. She will have one of her daughters call to make an appointment for them to discuss treatment options.

## 2021-05-06 NOTE — TELEPHONE ENCOUNTER
----- Message from Edith Wright MD sent at 5/5/2021 12:37 PM EDT -----  Bone density shows continued osteoporosis. It is not changed since done 2-years ago. Would like to discuss treatment options and reinforce calcium and vitamin D. She is on Fosamax.    Thanks,   Dr. Diana Duffy

## 2021-05-12 ENCOUNTER — OFFICE VISIT (OUTPATIENT)
Dept: FAMILY MEDICINE CLINIC | Age: 86
End: 2021-05-12
Payer: MEDICARE

## 2021-05-12 VITALS
HEIGHT: 63 IN | HEART RATE: 63 BPM | OXYGEN SATURATION: 97 % | DIASTOLIC BLOOD PRESSURE: 66 MMHG | RESPIRATION RATE: 20 BRPM | BODY MASS INDEX: 23.39 KG/M2 | WEIGHT: 132 LBS | TEMPERATURE: 98.1 F | SYSTOLIC BLOOD PRESSURE: 122 MMHG

## 2021-05-12 DIAGNOSIS — M81.0 AGE-RELATED OSTEOPOROSIS WITHOUT CURRENT PATHOLOGICAL FRACTURE: ICD-10-CM

## 2021-05-12 DIAGNOSIS — Z00.00 MEDICARE ANNUAL WELLNESS VISIT, SUBSEQUENT: Primary | ICD-10-CM

## 2021-05-12 PROCEDURE — G8420 CALC BMI NORM PARAMETERS: HCPCS | Performed by: INTERNAL MEDICINE

## 2021-05-12 PROCEDURE — 1101F PT FALLS ASSESS-DOCD LE1/YR: CPT | Performed by: INTERNAL MEDICINE

## 2021-05-12 PROCEDURE — 1090F PRES/ABSN URINE INCON ASSESS: CPT | Performed by: INTERNAL MEDICINE

## 2021-05-12 PROCEDURE — G0439 PPPS, SUBSEQ VISIT: HCPCS | Performed by: INTERNAL MEDICINE

## 2021-05-12 PROCEDURE — G0463 HOSPITAL OUTPT CLINIC VISIT: HCPCS | Performed by: INTERNAL MEDICINE

## 2021-05-12 PROCEDURE — G8432 DEP SCR NOT DOC, RNG: HCPCS | Performed by: INTERNAL MEDICINE

## 2021-05-12 PROCEDURE — G8427 DOCREV CUR MEDS BY ELIG CLIN: HCPCS | Performed by: INTERNAL MEDICINE

## 2021-05-12 PROCEDURE — G8536 NO DOC ELDER MAL SCRN: HCPCS | Performed by: INTERNAL MEDICINE

## 2021-05-12 PROCEDURE — 99213 OFFICE O/P EST LOW 20 MIN: CPT | Performed by: INTERNAL MEDICINE

## 2021-05-12 NOTE — PATIENT INSTRUCTIONS
Deciding About Bisphosphonate Medicine for Osteoporosis How can you decide about taking bisphosphonate medicine for osteoporosis? This information is right for you if you are a woman who has been through menopause. If that does not describe you, or if you're not sure, talk with your doctor about this decision. What is osteoporosis? Osteoporosis is a disease that affects your bones. It means you have bones that are thin and brittle and have lots of holes inside them like a sponge. This makes them easy to break. It can lead to broken bones (fractures), especially in the hip, spine, and wrist. These fractures may make it hard for you to live on your own. Bisphosphonates are the most common medicines used to prevent bone loss. They may be taken as a pill or an injection into a vein. Bisphosphonates slow the way bone dissolves and is absorbed by your body. They can increase bone thickness and strength. What are key points about this decision? · If you are at a higher risk of having a fracture, taking bisphosphonates is more likely to help you prevent a fracture. If your risk of a fracture is lower, it's less likely that these medicines will help you. · Bisphosphonates can cause problems with the jaw or thigh bone. But most women do not have these side effects. · Whether you take medicine or not, healthy habits can help protect your bones. Get enough calcium and vitamin D. Get regular weight-bearing exercise. Cut back on alcohol. And if you smoke, quit. Who is helped the most by bisphosphonates? For women who have been through menopause: · If you have osteoporosis (your T-score is -2.5 or less) or you have had a fracture, taking bisphosphonates lowers your risk of having a fracture. · If you haven't had a fracture and you have low bone density (your T-score is between -1.0 and -2.5, sometimes called osteopenia), taking bisphosphonates might lower your risk of having a fracture.  This evidence is not as strong. What are the side effects of bisphosphonates? These medicines can have side effects, such as heartburn and belly pain. Certain bone problems have also been reported in women taking bisphosphonates. Out of 1,000 people, about 1 person has a bone side effect during a year of taking bisphosphonates. That means 999 out of 1,000 people do not have a bone side effect. These bone side effects include problems with the jaw bone (called osteonecrosis). They also might include a certain kind of fracture of the thigh bone (called an atypical fracture), but more research is needed to find out if taking bisphosphonates is a cause of these fractures. Your decision Thinking about the facts and your feelings can help you make a decision that is right for you. Be sure you understand the benefits and risks of your options, and think about what else you need to do before you make the decision. Where can you learn more? Go to http://www.gray.com/ Enter K664 in the search box to learn more about \"Deciding About Bisphosphonate Medicine for Osteoporosis. \" Current as of: December 7, 2020               Content Version: 12.8 © 2006-2021 [employer]. Care instructions adapted under license by [employer] (which disclaims liability or warranty for this information). If you have questions about a medical condition or this instruction, always ask your healthcare professional. [address] any warranty or liability for your use of this information. Osteoporosis: Care Instructions Overview Osteoporosis causes bones to become thin and weak. It is much more common in women than in men. Your chances of getting this disease depend on several things. These factors include the thickness of your bones (bone density), as well as health, diet, and physical activity. This disease may be very advanced before you know you have it.  Sometimes the first sign is a broken bone in the hip, spine, or wrist. Or you may have sudden pain in your middle or lower back. Follow-up care is a key part of your treatment and safety. Be sure to make and go to all appointments, and call your doctor if you are having problems. It's also a good idea to know your test results and keep a list of the medicines you take. How can you care for yourself at home? · Your doctor may prescribe a bisphosphonate, such as risedronate (Actonel) or alendronate (Fosamax), for osteoporosis. If you are taking one of these medicines by mouth: 
? Take your medicine with a full glass of water when you first get up in the morning. ? Do not lie down, eat, drink a beverage, or take any other medicine for at least 30 minutes after taking the drug. This helps prevent stomach problems. ? Do not take your medicine late in the day if you forgot to take it in the morning. Skip it, and take the usual dose the next morning. ? If you have side effects, tell your doctor. Your doctor may prescribe another medicine. · Get enough calcium and vitamin D. The recommended daily allowances (RDAs) for adults younger than age 46 are 1,000 mg of calcium and 600 IU of vitamin D each day. Women ages 46 to 79 need 1,200 mg of calcium and 600 IU of vitamin D each day. Men ages 46 to 79 need 1,000 mg of calcium and 600 IU of vitamin D each day. Adults 71 and older need 1,200 mg of calcium and 800 IU of vitamin D each day. It's not clear if people who already have osteoporosis need more calcium and vitamin D than this. Talk to your doctor about what's right for you. 
? Eat foods rich in calcium, like yogurt, cheese, milk, and dark green vegetables. This is a good way to get the calcium you need. You can get vitamin D from eggs, fatty fish, cereal, and milk. ? Ask your doctor if you need to take a calcium plus vitamin D supplement. You may be able to get enough calcium and vitamin D through your diet. Be careful with supplements. Adults ages 23 to 48 should not get more than 2,500 mg of calcium and 4,000 IU of vitamin D each day, whether it is from supplements and/or food. Adults ages 46 and older should not get more than 2,000 mg of calcium and 4,000 IU of vitamin D each day from supplements and/or food. · Limit alcohol to 2 drinks a day for men and 1 drink a day for women. Too much alcohol can cause health problems. · Do not smoke. Smoking puts you at a much higher risk for osteoporosis. If you need help quitting, talk to your doctor about stop-smoking programs and medicines. These can increase your chances of quitting for good. · Get regular bone-building exercise. Weight-bearing and resistance exercises keep bones healthy by working the muscles and bones against gravity. Start out at an exercise level that feels right for you. Add a little at a time until you can do the following: ? Do 30 minutes of weight-bearing exercise on most days of the week. Walking, jogging, stair climbing, and dancing are good choices. ? Do resistance exercises with weights or elastic bands 2 to 3 days a week. · Reduce your risk of falls: 
? Wear supportive shoes with low heels and nonslip soles. ? Use a cane or walker, if you need it. Use shower chairs and bath benches. Put in handrails on stairways, around your shower or tub area, and near the toilet. ? Keep stairs, porches, and walkways well lit. Use night-lights. ? Remove throw rugs and other objects that are in the way. ? Avoid icy, wet, or slippery surfaces. ? Keep a cordless phone and a flashlight with new batteries by your bed. When should you call for help? Watch closely for changes in your health, and be sure to contact your doctor if you have any problems. Where can you learn more? Go to http://www.gray.com/ Enter K100 in the search box to learn more about \"Osteoporosis: Care Instructions. \" Current as of: December 7, 2020               Content Version: 12.8 © 4634-0546 [employer]. Care instructions adapted under license by [employer] (which disclaims liability or warranty for this information). If you have questions about a medical condition or this instruction, always ask your healthcare professional. Nahun Medrano any warranty or liability for your use of this information. Calcium Content of Selected Foods Dairy and Soy Amount Calcium (mg) Milk (skim, low fat, whole) 1 cup 300 Buttermilk 1 cup 300 [address] . 5 cup 65 Ice Cream or Ice Milk . 5 cup 100 Sour Cream, cultured 1 cup 250 Soy Milk, calcium fortified 1 cup 200 to 400 Yogurt 1 cup 450 Yogurt drink 12 oz 300 [place] Instant Breakfast 1 packet 250 Hot Cocoa, calcium fortified 1 packet 320 Nonfat dry milk powder 5 Tbsp 300 Brie Cheese 1 oz 50 Hard Cheese (cheddar, lyle) 1 oz 200 Mozzarella 1 oz 200 Parmesan Cheese 1 Tbsp 70 Swiss or Gruyere 1 oz 270 Vegetables [place] squash, cooked 1 cup 90 Arugula, raw 1 cup 125 Bok [place], raw 1 cup 40 Broccoli, cooked 1 cup 180 Chard or Okra, cooked 1 cup 100 Chicory (curly endive), raw 1 cup 40 Sam greens 1 cup 50 Corn, brine packed 1 cup 10 Dandelion greens, raw 1 cup 80 Kale, raw 1 cup 55 Kelp or Kombe 1 cup 60 Mustard greens 1 cup 40 Spinach, cooked 1 cup 240 Turnip greens, raw 1 cup 80 Fruits Figs, dried, uncooked 1 cup 300 Kiwi, raw 1 cup 50 Orange juice, calcium fortified 8 oz 300 Orange juice, from concentrate 1 cup 20 Legumes Garbanzo Beans, cooked 1 cup 80 Legumes, general, cooked . 5 cup 15 to 50 Moreno Beans, cooked 1 cup 75 Soybeans, boiled . 5 cup 100 Temphe . 5 cup 75 Tofu, firm, calcium set 4 oz 250 to 750 Tofu, soft regular 4 oz 120 to 390 White Beans, cooked . 5 cup 70 Grains Cereals (calcium fortified) . 5 to 1 cup 250 to 1000 Amaranth, cooked . 5 cup 135 Bread, calcium fortified 1 slice 528 to 200 Brown rice, long grain, raw 1 cup 50 Oatmeal, instant 1 package 100 to 150 Tortillas, corn 2 85 Nuts and Seeds Almonds, toasted unblanched 1 oz 80 Sesame seeds, whole roasted 1 oz 280 Sesame tahini 1 oz (2 Tbsp) 130 Sunflower seeds, dried 1 oz 50 Fish  
Mackerel, canned 3 oz 250 [place], canned, with bones 3 oz 170 to 210 Sardines 3 oz 370 Other Molasses, blackstrap 1 Tbsp 135 * When range is given, calcium content varies by product. * The calcium content of plant foods is varied. Most vegetables, legumes, nuts, seeds, and dried fruit contain some calcium. Listed are selected significant sources of well-absorbed calcium. References: · [employer], Handbook 8 palm program  
· [place] [address] How Much Do You Need? Age Calcium (mg) 3- 1year old 500 mg  
3- 6year old 800 mg  
5- 25year old 1300 mg  
23- 48year old 1000 mg  
46- 79year old 1200 mg  
> 79year old 1200 mg Vitamin D 2000 international units daily.

## 2021-05-12 NOTE — PROGRESS NOTES
CC:  Chief Complaint   Patient presents with    Results     bone density results        This is the Subsequent Medicare Annual Wellness Exam, performed 12 months or more after the Initial AWV or the last Subsequent AWV    I have reviewed the patient's medical history in detail and updated the computerized patient record. 89F who presents for AWV and to discuss recent bone density which shows continued and worsening osteoporosis. This had been discussed before, but lost to follow up due to COVID-19. In addition, she still has labs that were ordered in February that need to be done. She will return for these in the fasting state. Assessment/Plan   Education and counseling provided:  Are appropriate based on today's review and evaluation  End-of-Life planning (with patient's consent)  Pneumococcal Vaccine  Cardiovascular screening blood test  Bone mass measurement (DEXA)  Screening for glaucoma  Diabetes screening test    Diagnoses and all orders for this visit:    1. Medicare annual wellness visit, subsequent   Anticipatory guidance discussed. Immunizations reviewed   HM updated. 2. Age-related osteoporosis without current pathological fracture  -     REFERRAL TO ENDOCRINOLOGY    I have discussed the diagnosis with the patient and the intended treatment plan as seen in the above orders. The patient has received an after-visit summary and questions were answered concerning future plans. Asked to return should symptoms worsen or not improve with treatment. Any pending labs and studies will be relayed to patient when they become available. Pt verbalizes understanding of plan of care and denies further questions or concerns at this time. Follow-up and Dispositions    · Return in 1 year (on 5/12/2022), or if symptoms worsen or fail to improve, for Follow up 6 months for chronic issues.        Depression Risk Factor Screening     3 most recent PHQ Screens 2/1/2021   Little interest or pleasure in doing things Not at all   Feeling down, depressed, irritable, or hopeless Not at all   Total Score PHQ 2 0       Alcohol Risk Screen    Do you average more than 1 drink per night or more than 7 drinks a week:  No  On any one occasion in the past three months have you have had more than 3 drinks containing alcohol:  No        Functional Ability and Level of Safety    Hearing: Hearing is good. Activities of Daily Living: The home contains: handrails and grab bars  Patient does total self care      Ambulation: with mild difficulty     Fall Risk:  Fall Risk Assessment, last 12 mths 2/1/2021   Able to walk? Yes   Fall in past 12 months? 0   Do you feel unsteady? 1   Are you worried about falling 0   Is the gait abnormal? 0   Number of falls in past 12 months -   Fall with injury? -      Abuse Screen:  Patient is not abused       Cognitive Screening    Has your family/caregiver stated any concerns about your memory: yes - \"I forget\"  Cognitive Screening: Normal - Clock Drawing Test, Mini Cog Test     OBJECTIVE:  Visit Vitals  /66 (BP 1 Location: Right arm, BP Patient Position: Sitting, BP Cuff Size: Adult)   Pulse 63   Temp 98.1 °F (36.7 °C) (Temporal)   Resp 20   Ht 5' 3\" (1.6 m)   Wt 132 lb (59.9 kg)   SpO2 97%   BMI 23.38 kg/m²     General appearance: alert, well appearing, and in no distress. Chest: clear to auscultation, no wheezes, rales or rhonchi, symmetric air entry. CVS exam: normal rate, regular rhythm, normal S1, S2, no murmurs, rubs, clicks or gallops. Oropharyngeal exam - mucous membranes moist, pharynx normal without lesions. Ears - bilateral TM's and external ear canals normal.  Exam of extremities: peripheral pulses normal, no pedal edema, no clubbing or cyanosis  Skin exam - normal coloration and turgor, no rashes, no suspicious skin lesions noted.       Health Maintenance Due     Health Maintenance Due   Topic Date Due    COVID-19 Vaccine (1) Never done    Shingrix Vaccine Age 50> (1 of 2) Never done    Pneumococcal 65+ years (2 of 2 - PPSV23) 04/09/2016    Lipid Screen  11/01/2019    Eye Exam Retinal or Dilated  10/04/2020    MICROALBUMIN Q1  02/11/2021       Patient Care Team   Patient Care Team:  Kadie Gerber MD as PCP - General  Kadie Gerber MD as PCP - [facility]  Ki Stack, RN as Ambulatory Care Manager    History     Patient Active Problem List   Diagnosis Code    Type 2 diabetes mellitus without complication, without long-term current use of insulin ([facility] [address] E11.9    Essential hypertension, benign I10    Hyperlipidemia E78.5    Hypothyroidism E03.9    Allergic rhinitis, cause unspecified J30.9    Chronic back pain M54.9, G89.29    Osteoporosis M81.0    Fall at home [address] 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    Controlled type 2 diabetes mellitus without complication (HCC) H55.0       Past Medical History:   Diagnosis Date    Allergic rhinitis     Chronic back pain     Diabetes ([facility] [address]     Hypercholesterolemia     Hypertension     Thyroid disease         Past Surgical History:   Procedure Laterality Date    HX CHOLECYSTECTOMY      WA BREAST SURGERY PROCEDURE UNLISTED         Current Outpatient Medications   Medication Sig Dispense Refill    levothyroxine (SYNTHROID) 50 mcg tablet TAKE 1 TABLET BY MOUTH EVERY DAY BEFORE BREAKFAST 90 Tab 1    rosuvastatin (CRESTOR) 20 mg tablet TAKE 1 TABLET BY MOUTH EVERY DAY 90 Tab 1    losartan (COZAAR) 50 mg tablet TAKE 1 TABLET BY MOUTH EVERY DAY 90 Tab 1    omeprazole (PRILOSEC) 20 mg capsule TAKE 2 CAPSULES BY MOUTH EVERY  Cap 1    LACTOSE-REDUCED FOOD (PROTEIN NUTRITIONAL SHAKE PO) Take 1 Bottle by mouth daily.  cholecalciferol, vitamin d3, (VITAMIN D) 1,000 unit tablet Take 1,000 Units by mouth daily.       calcium carbonate (CALCIUM 500) 500 mg (1,250 mg) tablet Take 1,250 mg by mouth daily.  aspirin delayed-release 81 mg tablet take 81 mg by mouth daily.  PROAIR RESPICLICK 90 mcg/actuation aepb Take 1-2 Puffs by inhalation every four (4) hours. 1 Inhaler 11    naproxen (NAPROSYN) 250 mg tablet TAKE 1 TABLET BY MOUTH TWICE A DAY WITH MEALS FOR 30 DAYS 60 Tab 5    DUREZOL 0.05 % ophthalmic emulsion INSTILL 1 DROP IN RIGHT EYE AS DIRECTED  1    BESIVANCE 0.6 % drps ophthalmic suspension STARTING THE DAY BEFORE SURGERY, INSTILL 1 DROP IN RIGHT EYE TWICE A DAY  1    ILEVRO 0.3 % drps STARTING DAY BEFORE SURGERY, INSTILL 1 DROP IN RIGHT EYE DAILY  1    tiZANidine (ZANAFLEX) 2 mg tablet TAKE 1 TABLET AT BEDTIME AS NEEDED FOR MUSCLE SPASM. 15 Tab 0    loratadine (CLARITIN) 10 mg tablet Take 10 mg by mouth.  alendronate (FOSAMAX) 70 mg tablet Take 1 Tab by mouth every seven (7) days. 4 Tab 11       Allergies   Allergen Reactions    Advil Pm  [Ibuprofen-Diphenhydramine Cit]      Other reaction(s): Adverse reaction to substance , Hives    Codeine Other (comments)     Causes stomach pains       Family History   Problem Relation Age of Onset    Heart Disease Mother     Heart Disease Father     Diabetes Paternal Grandfather        Social History     Tobacco Use    Smoking status: Never Smoker    Smokeless tobacco: Never Used   Substance Use Topics    Alcohol use: No     Alcohol/week: 0.0 standard drinks       Merry Fields MD     Patient Instructions          Deciding About Bisphosphonate Medicine for Osteoporosis  How can you decide about taking bisphosphonate medicine for osteoporosis? This information is right for you if you are a woman who has been through menopause. If that does not describe you, or if you're not sure, talk with your doctor about this decision. What is osteoporosis? Osteoporosis is a disease that affects your bones.  It means you have bones that are thin and brittle and have lots of holes inside them like a sponge. This makes them easy to break. It can lead to broken bones (fractures), especially in the hip, spine, and wrist. These fractures may make it hard for you to live on your own. Bisphosphonates are the most common medicines used to prevent bone loss. They may be taken as a pill or an injection into a vein. Bisphosphonates slow the way bone dissolves and is absorbed by your body. They can increase bone thickness and strength. What are key points about this decision? · If you are at a higher risk of having a fracture, taking bisphosphonates is more likely to help you prevent a fracture. If your risk of a fracture is lower, it's less likely that these medicines will help you. · Bisphosphonates can cause problems with the jaw or thigh bone. But most women do not have these side effects. · Whether you take medicine or not, healthy habits can help protect your bones. Get enough calcium and vitamin D. Get regular weight-bearing exercise. Cut back on alcohol. And if you smoke, quit. Who is helped the most by bisphosphonates? For women who have been through menopause:  · If you have osteoporosis (your T-score is -2.5 or less) or you have had a fracture, taking bisphosphonates lowers your risk of having a fracture. · If you haven't had a fracture and you have low bone density (your T-score is between -1.0 and -2.5, sometimes called osteopenia), taking bisphosphonates might lower your risk of having a fracture. This evidence is not as strong. What are the side effects of bisphosphonates? These medicines can have side effects, such as heartburn and belly pain. Certain bone problems have also been reported in women taking bisphosphonates. Out of 1,000 people, about 1 person has a bone side effect during a year of taking bisphosphonates. That means 999 out of 1,000 people do not have a bone side effect. These bone side effects include problems with the jaw bone (called osteonecrosis).  They also might include a certain kind of fracture of the thigh bone (called an atypical fracture), but more research is needed to find out if taking bisphosphonates is a cause of these fractures. Your decision  Thinking about the facts and your feelings can help you make a decision that is right for you. Be sure you understand the benefits and risks of your options, and think about what else you need to do before you make the decision. Where can you learn more? Go to http://www.gray.com/  Enter [id number] in the search box to learn more about \"Deciding About Bisphosphonate Medicine for Osteoporosis. \"  Current as of: December 7, 2020               Content Version: 12.8  © 8972-0092 [employer]. Care instructions adapted under license by [employer] (which disclaims liability or warranty for this information). If you have questions about a medical condition or this instruction, always ask your healthcare professional. [place] [address] any warranty or liability for your use of this information. Osteoporosis: Care Instructions  Overview     Osteoporosis causes bones to become thin and weak. It is much more common in women than in men. Your chances of getting this disease depend on several things. These factors include the thickness of your bones (bone density), as well as health, diet, and physical activity. This disease may be very advanced before you know you have it. Sometimes the first sign is a broken bone in the hip, spine, or wrist. Or you may have sudden pain in your middle or lower back. Follow-up care is a key part of your treatment and safety. Be sure to make and go to all appointments, and call your doctor if you are having problems. It's also a good idea to know your test results and keep a list of the medicines you take. How can you care for yourself at home?   · Your doctor may prescribe a bisphosphonate, such as risedronate (Actonel) or alendronate (Fosamax), for osteoporosis. If you are taking one of these medicines by mouth:  ? Take your medicine with a full glass of water when you first get up in the morning. ? Do not lie down, eat, drink a beverage, or take any other medicine for at least 30 minutes after taking the drug. This helps prevent stomach problems. ? Do not take your medicine late in the day if you forgot to take it in the morning. Skip it, and take the usual dose the next morning. ? If you have side effects, tell your doctor. Your doctor may prescribe another medicine. · Get enough calcium and vitamin D. The recommended daily allowances (RDAs) for adults younger than age 46 are 1,000 mg of calcium and 600 IU of vitamin D each day. Women ages 46 to 79 need 1,200 mg of calcium and 600 IU of vitamin D each day. Men ages 46 to 79 need 1,000 mg of calcium and 600 IU of vitamin D each day. Adults 71 and older need 1,200 mg of calcium and 800 IU of vitamin D each day. It's not clear if people who already have osteoporosis need more calcium and vitamin D than this. Talk to your doctor about what's right for you.  ? Eat foods rich in calcium, like yogurt, cheese, milk, and dark green vegetables. This is a good way to get the calcium you need. You can get vitamin D from eggs, fatty fish, cereal, and milk. ? Ask your doctor if you need to take a calcium plus vitamin D supplement. You may be able to get enough calcium and vitamin D through your diet. Be careful with supplements. Adults ages 23 to 48 should not get more than 2,500 mg of calcium and 4,000 IU of vitamin D each day, whether it is from supplements and/or food. Adults ages 46 and older should not get more than 2,000 mg of calcium and 4,000 IU of vitamin D each day from supplements and/or food. · Limit alcohol to 2 drinks a day for men and 1 drink a day for women. Too much alcohol can cause health problems. · Do not smoke. Smoking puts you at a much higher risk for osteoporosis. If you need help quitting, talk to your doctor about stop-smoking programs and medicines. These can increase your chances of quitting for good. · Get regular bone-building exercise. Weight-bearing and resistance exercises keep bones healthy by working the muscles and bones against gravity. Start out at an exercise level that feels right for you. Add a little at a time until you can do the following:  ? Do 30 minutes of weight-bearing exercise on most days of the week. Walking, jogging, stair climbing, and dancing are good choices. ? Do resistance exercises with weights or elastic bands 2 to 3 days a week. · Reduce your risk of falls:  ? Wear supportive shoes with low heels and nonslip soles. ? Use a cane or walker, if you need it. Use shower chairs and bath benches. Put in handrails on stairways, around your shower or tub area, and near the toilet. ? Keep stairs, porches, and walkways well lit. Use night-lights. ? Remove throw rugs and other objects that are in the way. ? Avoid icy, wet, or slippery surfaces. ? Keep a cordless phone and a flashlight with new batteries by your bed. When should you call for help? Watch closely for changes in your health, and be sure to contact your doctor if you have any problems. Where can you learn more? Go to http://www.gray.com/  Enter K100 in the search box to learn more about \"Osteoporosis: Care Instructions. \"  Current as of: December 7, 2020               Content Version: 12.8  © 2006-2021 [employer]. Care instructions adapted under license by [employer] (which disclaims liability or warranty for this information). If you have questions about a medical condition or this instruction, always ask your healthcare professional. [place] [address] any warranty or liability for your use of this information.       Calcium Content of Selected Foods     Dairy and Soy Amount Calcium (mg)   Milk (skim, low fat, whole) 1 cup 300   Buttermilk 1 cup 300   Cottage Cheese . 5 cup 65   Ice Cream or Ice Milk . 5 cup 100   Sour Cream, cultured 1 cup 250   Soy Milk, calcium fortified 1 cup 200 to 400   Yogurt 1 cup 450   Yogurt drink 12 oz 300   [place] Instant Breakfast 1 packet 250   Hot [place], calcium fortified 1 packet 320   Nonfat dry milk powder 5 Tbsp 300   Brie Cheese 1 oz 50   Hard Cheese (cheddar, lyle) 1 oz 200   Mozzarella 1 oz 200   Parmesan Cheese 1 Tbsp 70   Swiss or Gruyere 1 oz 270      Vegetables   [place] squash, cooked 1 cup 90   Arugula, raw 1 cup 125   Bok [place], raw 1 cup 40   Broccoli, cooked 1 cup 180   Chard or [place], cooked 1 cup 100   Chicory (curly endive), raw 1 cup 40   Sam greens 1 cup 50   Corn, brine packed 1 cup 10   Dandelion greens, raw 1 cup 80   Kale, raw 1 cup 55   Kelp or [place] 1 cup 60   Mustard greens 1 cup 40   Spinach, cooked 1 cup 240   Turnip greens, raw 1 cup 80      Fruits   Figs, dried, uncooked 1 cup 300   Kiwi, raw 1 cup 50   Orange juice, calcium fortified 8 oz 300   Orange juice, from concentrate 1 cup 20      Legumes   Garbanzo Beans, cooked 1 cup 80   Legumes, general, cooked . 5 cup 15 to 50   [place] Beans, cooked 1 cup 75   Soybeans, boiled . 5 cup 100   Temphe . 5 cup 75   Tofu, firm, calcium set 4 oz 250 to 750   Tofu, soft regular 4 oz 120 to 390   White Beans, cooked . 5 cup 70      Grains   Cereals (calcium fortified) . 5 to 1 cup 250 to 1000   Amaranth, cooked . 5 cup 135   Bread, calcium fortified 1 slice 182 to 565   Brown rice, long grain, raw 1 cup 50   Oatmeal, instant 1 package 100 to 150   Tortillas, corn 2 85      Nuts and Seeds   Almonds, toasted unblanched 1 oz 80   Sesame seeds, whole roasted 1 oz 280   Sesame tahini 1 oz (2 Tbsp) 130   Sunflower seeds, dried 1 oz 50      Fish   Mackerel, canned 3 oz 250   [place], canned, with bones 3 oz 170 to 210   Sardines 3 oz 370      Other   Molasses, blackstrap 1 Tbsp 135   * When range is given, calcium content varies by product. * The calcium content of plant foods is varied. Most vegetables, legumes, nuts, seeds, and dried fruit contain some calcium. Listed are selected significant sources of well-absorbed calcium. References:   · USDA database, Handbook 8 palm program   · Mina and Christianity      How Much Do You Need? Age Calcium (mg)   3- 1year old 500 mg   3- 6year old 800 mg   5- 25year old 1300 mg   23- 48year old 1000 mg   46- 79year old 1200 mg   > 79year old 1200 mg     Vitamin D 2000 international units daily.

## 2021-05-12 NOTE — PROGRESS NOTES
Identified pt with two pt identifiers(name and ). Chief Complaint   Patient presents with    Results     bone density results         Health Maintenance Due   Topic    COVID-19 Vaccine (1)    Shingrix Vaccine Age 49> (1 of 2)    Pneumococcal 65+ years (2 of 2 - PPSV23)    Lipid Screen     Eye Exam Retinal or Dilated     Medicare Yearly Exam     MICROALBUMIN Q1        Wt Readings from Last 3 Encounters:   21 132 lb (59.9 kg)   21 137 lb (62.1 kg)   20 128 lb (58.1 kg)     Temp Readings from Last 3 Encounters:   21 98.1 °F (36.7 °C) (Temporal)   21 98.5 °F (36.9 °C) (Oral)   20 97.5 °F (36.4 °C) (Oral)     BP Readings from Last 3 Encounters:   21 122/66   21 122/64   20 124/70     Pulse Readings from Last 3 Encounters:   21 63   21 72   20 (!) 102         Learning Assessment:  :     Learning Assessment 2014   PRIMARY LEARNER Patient   HIGHEST LEVEL OF EDUCATION - PRIMARY LEARNER  DID NOT GRADUATE HIGH SCHOOL   BARRIERS PRIMARY LEARNER NONE   CO-LEARNER CAREGIVER No   PRIMARY LANGUAGE ENGLISH   LEARNER PREFERENCE PRIMARY LISTENING   ANSWERED BY patient    RELATIONSHIP SELF       Depression Screening:  :     3 most recent PHQ Screens 2021   Little interest or pleasure in doing things Not at all   Feeling down, depressed, irritable, or hopeless Not at all   Total Score PHQ 2 0       Fall Risk Assessment:  :     Fall Risk Assessment, last 12 mths 2021   Able to walk? Yes   Fall in past 12 months? 0   Do you feel unsteady? 1   Are you worried about falling 0   Is the gait abnormal? 0   Number of falls in past 12 months -   Fall with injury? -       Abuse Screening:  :     Abuse Screening Questionnaire 2021 2020 3/12/2019 10/17/2018 2018 2017 2014   Do you ever feel afraid of your partner? N N N N N N N   Are you in a relationship with someone who physically or mentally threatens you?  N N N N N N N   Is it safe for you to go home? Y Y Y Y Y Y Y       Coordination of Care Questionnaire:  :     1) Have you been to an emergency room, urgent care clinic since your last visit? no   Hospitalized since your last visit? no             2) Have you seen or consulted any other health care providers outside of [address] [place] [address] since your last visit? no  (Include any pap smears or colon screenings in this section.)    3) Do you have an Advance Directive on file? no  Are you interested in receiving information about Advance Directives? no    Patient is accompanied by daughter I have received verbal consent from Teresita Rojas to discuss any/all medical information while they are present in the room. Reviewed record in preparation for visit and have obtained necessary documentation.

## 2021-05-19 ENCOUNTER — TELEPHONE (OUTPATIENT)
Dept: FAMILY MEDICINE CLINIC | Age: 86
End: 2021-05-19

## 2021-05-19 DIAGNOSIS — M54.42 ACUTE BILATERAL LOW BACK PAIN WITH BILATERAL SCIATICA: ICD-10-CM

## 2021-05-19 DIAGNOSIS — M54.41 ACUTE BILATERAL LOW BACK PAIN WITH BILATERAL SCIATICA: ICD-10-CM

## 2021-05-19 RX ORDER — TRAMADOL HYDROCHLORIDE 50 MG/1
50 TABLET ORAL
Qty: 14 TABLET | Refills: 0 | Status: SHIPPED | OUTPATIENT
Start: 2021-05-19 | End: 2021-06-02

## 2021-05-19 NOTE — TELEPHONE ENCOUNTER
Daughter is calling and stated that her mother is suffering really bad with back pain. They are trying to get a sooner appt with specialist but not able to til the end of June. She is asking if Dr. Britta Driscoll will give her mother tramadol until she can go to her appt. If this can be done please send it to [address]. Please call daughter at [phone] to advise.

## 2021-06-16 ENCOUNTER — TELEPHONE (OUTPATIENT)
Dept: FAMILY MEDICINE CLINIC | Age: 86
End: 2021-06-16

## 2021-06-16 DIAGNOSIS — M54.41 ACUTE BILATERAL LOW BACK PAIN WITH BILATERAL SCIATICA: ICD-10-CM

## 2021-06-16 DIAGNOSIS — M54.42 ACUTE BILATERAL LOW BACK PAIN WITH BILATERAL SCIATICA: ICD-10-CM

## 2021-06-16 RX ORDER — TRAMADOL HYDROCHLORIDE 50 MG/1
50 TABLET ORAL
Qty: 30 TABLET | Refills: 0 | Status: SHIPPED | OUTPATIENT
Start: 2021-06-16 | End: 2022-04-13 | Stop reason: SDUPTHER

## 2021-06-16 NOTE — TELEPHONE ENCOUNTER
Pt's family is calling in regards to pt's back pain and does not have an appt until 7/20/21 with pain management but still in a lot of pain and not sure if there is something besides tramadol that will help    Call Getachew Jeronimo daughter # [phone]

## 2021-06-16 NOTE — TELEPHONE ENCOUNTER
L/M with Dr Chema Pardo message and to call with any questions. Refill for Tramadol has been sent for #30 pills. Use Tylenol 650 mgs on days when pain is not as intense up to every 8 hours with the Tylenol. Tramadol is 1 x per day IF needed.

## 2021-07-20 ENCOUNTER — OFFICE VISIT (OUTPATIENT)
Dept: ENDOCRINOLOGY | Age: 86
End: 2021-07-20
Payer: MEDICARE

## 2021-07-20 VITALS
BODY MASS INDEX: 22.09 KG/M2 | DIASTOLIC BLOOD PRESSURE: 68 MMHG | RESPIRATION RATE: 16 BRPM | HEART RATE: 60 BPM | WEIGHT: 124.7 LBS | HEIGHT: 63 IN | OXYGEN SATURATION: 95 % | SYSTOLIC BLOOD PRESSURE: 121 MMHG

## 2021-07-20 DIAGNOSIS — M81.0 AGE RELATED OSTEOPOROSIS, UNSPECIFIED PATHOLOGICAL FRACTURE PRESENCE: Primary | ICD-10-CM

## 2021-07-20 PROCEDURE — 1101F PT FALLS ASSESS-DOCD LE1/YR: CPT | Performed by: INTERNAL MEDICINE

## 2021-07-20 PROCEDURE — G8536 NO DOC ELDER MAL SCRN: HCPCS | Performed by: INTERNAL MEDICINE

## 2021-07-20 PROCEDURE — G8432 DEP SCR NOT DOC, RNG: HCPCS | Performed by: INTERNAL MEDICINE

## 2021-07-20 PROCEDURE — G8420 CALC BMI NORM PARAMETERS: HCPCS | Performed by: INTERNAL MEDICINE

## 2021-07-20 PROCEDURE — 1090F PRES/ABSN URINE INCON ASSESS: CPT | Performed by: INTERNAL MEDICINE

## 2021-07-20 PROCEDURE — G8427 DOCREV CUR MEDS BY ELIG CLIN: HCPCS | Performed by: INTERNAL MEDICINE

## 2021-07-20 PROCEDURE — 99204 OFFICE O/P NEW MOD 45 MIN: CPT | Performed by: INTERNAL MEDICINE

## 2021-07-20 NOTE — PROGRESS NOTES
Chief Complaint   Patient presents with    New Patient     History of Present Illness: Hanh Skaggs is a 80 y.o. female with a past medical hx significant for IFG seen in referral from Yaneth Mckeon MD for discussion related to osteoporosis management. Had rotator cuff injury following a fall, had 2 black eyes. Menopause was at least 62 years ago, 6 kids. Never smoker, took fosamax for 5 years. Has dentures, full set. Eats 2 meals per day- cereal both meals with 2% milk. Past Medical History:   Diagnosis Date    Allergic rhinitis     Chronic back pain     Diabetes ([address]     Hypercholesterolemia     Hypertension     Thyroid disease      Past Surgical History:   Procedure Laterality Date    HX CHOLECYSTECTOMY      NE BREAST SURGERY PROCEDURE UNLISTED       Current Outpatient Medications   Medication Sig    levothyroxine (SYNTHROID) 50 mcg tablet TAKE 1 TABLET BY MOUTH EVERY DAY BEFORE BREAKFAST    rosuvastatin (CRESTOR) 20 mg tablet TAKE 1 TABLET BY MOUTH EVERY DAY    losartan (COZAAR) 50 mg tablet TAKE 1 TABLET BY MOUTH EVERY DAY    omeprazole (PRILOSEC) 20 mg capsule TAKE 2 CAPSULES BY MOUTH EVERY DAY    cholecalciferol, vitamin d3, (VITAMIN D) 1,000 unit tablet Take 1,000 Units by mouth daily.  calcium carbonate (CALCIUM 500) 500 mg (1,250 mg) tablet Take 1,250 mg by mouth daily.  aspirin delayed-release 81 mg tablet take 81 mg by mouth daily.  ILEVRO 0.3 % drps STARTING DAY BEFORE SURGERY, INSTILL 1 DROP IN RIGHT EYE DAILY (Patient not taking: Reported on 7/20/2021)     No current facility-administered medications for this visit. Allergies   Allergen Reactions    Advil Pm  [Ibuprofen-Diphenhydramine Cit]      Other reaction(s):  Adverse reaction to substance , Hives    Codeine Other (comments)     Causes stomach pains     Family History   Problem Relation Age of Onset    Heart Disease Mother     Heart Disease Father     Diabetes Paternal Grandfather Social Hx: lives with son- 62 in [place]  Non smoker    Review of Systems:  - Constitutional Symptoms: no fevers, chills, weight loss  - Eyes: no blurry vision or double vision  - Cardiovascular: no chest pain or palpitations  - Respiratory: no cough or shortness of breath  - Gastrointestinal: no dysphagia or abdominal pain  - Musculoskeletal: R wrist pain, back pain  - Integumentary: no rashes  - Neurological: no numbness, tingling, or headaches  - Psychiatric: no depression or anxiety  - Endocrine: no heat or cold intolerance, no polyuria or polydipsia    - Physical Examination:  Visit Vitals  /68   Pulse 60   Resp 16   Ht 5' 3\" (1.6 m)   Wt 124 lb 11.2 oz (56.6 kg)   SpO2 95%   BMI 22.09 kg/m²     - - GENERAL: NCAT, Appears well nourished   - EYES: EOMI, non-icteric, no proptosis   - Ear/Nose/Throat: NCAT, no visible inflammation or masses   - CARDIOVASCULAR: no cyanosis, no visible JVD   - RESPIRATORY: respiratory effort normal without any distress or labored breathing   - MUSCULOSKELETAL: Normal ROM of neck and upper extremities observed   - SKIN: No rash on face  - NEUROLOGIC:  No facial asymmetry (Cranial nerve 7 motor function), No gaze palsy   - PSYCHIATRIC: Normal affect, Normal insight and judgement     Data Reviewed:   Results for Elma Wall (MRN [id number]) as of 7/20/2021 13:39   Ref. Range 11/1/2018 09:43   GFR est AA Latest Ref Range: >59 mL/min/1.73 67   Results for Elma aWll (MRN [id number]) as of 7/20/2021 13:39   Ref. Range 2/11/2020 12:06   TSH Latest Ref Range: 0.450 - 4.500 uIU/mL 0.615   Results for Elma Wall (MRN [id number]) as of 7/20/2021 13:39   Ref. Range 11/1/2018 09:43   VITAMIN D, 25-HYDROXY Latest Ref Range: 30.0 - 100.0 ng/mL 45.6       Assessment/Plan: This is a very pleasant 79 yo female with a past medical hx significant for IFG seen in referral from Stephon Box MD for discussion related to management of osteoporosis.  Has previously taken bisphosphonate for 5 years with maintenance of bone mineral density. Will switch to anabolic agent- reviewed risk of ONJ, hypOcalcemia which would manifest as perioral numbness/ tingling in fingertips. Continue twice daily calcium at separate times, look for calcium fortified cereals. Will need to be given every 6 months indefinitely. Obtain 25 oh vit D level prior to administration of first dose. #osteoporosis  -s/p 5 years of bisphosphonate  -anticipate initiation of prolia once vitamin D level is found to be >30  -discussed risk of hypOcalcemia with this med, presents as perioral numbness and numbness in fingertips  -would be very surprised if this occurred given normocalcemia and normal renal fx currently  -continue 2x daily calcium supplementation at different times, look for calcium fortified cereals  Calcium Content of Selected Foods    Dairy and Soy Amount Calcium (mg)   Milk (skim, low fat, whole) 1 cup 300   Buttermilk 1 cup 593 [facility] . 5 cup 65   Ice Cream or Ice Milk . 5 cup 100   Sour Cream, cultured 1 cup 250   Soy Milk, calcium fortified 1 cup 200 to 400   Yogurt 1 cup 450   Yogurt drink 12 oz 300   [place] Instant Breakfast 1 packet 250   Hot [place], calcium fortified 1 packet 320   Nonfat dry milk powder 5 Tbsp 300   Brie Cheese 1 oz 50   Hard Cheese (cheddar, lyle) 1 oz 200   Mozzarella 1 oz 200   Parmesan Cheese 1 Tbsp 70   Swiss or Gruyere 1 oz 270     Vegetables   [place] squash, cooked 1 cup 90   Arugula, raw 1 cup 125   Bok [place], raw 1 cup 40   Broccoli, cooked 1 cup 180   Chard or Okra, cooked 1 cup 100   Chicory (curly endive), raw 1 cup 40   Sam greens 1 cup 50   Corn, brine packed 1 cup 10   Dandelion greens, raw 1 cup 80   Kale, raw 1 cup 55   Kelp or Kombe 1 cup 60   Mustard greens 1 cup 40   Spinach, cooked 1 cup 240   Turnip greens, raw 1 cup 80     Fruits   Figs, dried, uncooked 1 cup 300   Kiwi, raw 1 cup 50   Orange juice, calcium fortified 8 oz 300   Orange juice, from concentrate 1 cup 20     Legumes   Garbanzo Beans, cooked 1 cup 80   Legumes, general, cooked . 5 cup 15 to 50   [place] Beans, cooked 1 cup 75   Soybeans, boiled . 5 cup 100   Temphe . 5 cup 75   Tofu, firm, calcium set 4 oz 250 to 750   Tofu, soft regular 4 oz 120 to 390   White Beans, cooked . 5 cup 70     Grains   Cereals (calcium fortified) . 5 to 1 cup 250 to 1000   Amaranth, cooked . 5 cup 135   Bread, calcium fortified 1 slice 130 to 008   Brown rice, long grain, raw 1 cup 50   Oatmeal, instant 1 package 100 to 150   Tortillas, corn 2 85     Nuts and Seeds   Almonds, toasted unblanched 1 oz 80   Sesame seeds, whole roasted 1 oz 280   Sesame tahini 1 oz (2 Tbsp) 130   Sunflower seeds, dried 1 oz 50     Fish   Mackerel, canned 3 oz 250   [place], canned, with bones 3 oz 170 to 210   Sardines 3 oz 370     Other   Molasses, blackstrap 1 Tbsp 135   * When range is given, calcium content varies by product. * The calcium content of plant foods is varied. Most vegetables, legumes, nuts, seeds, and dried fruit contain some calcium. Listed are selected significant sources of well-absorbed calcium. References:   [employer], Handbook 8 palm program    Mina and [employer]     How Much Do You Need? Age Calcium (mg)   3- 1year old 500 mg   3- 6year old 800 mg   5- 25year old 1300 mg   23- 48year old 1000 mg   46- 75 year old 1200 mg   > 79year old 1200 mg     [email or web address]    Copy sent to:Mariana Pickering MD    Return to care in 1 year    [facility]     [phone] ** Lauren Garcia best number to call to schedule prolia.

## 2021-07-20 NOTE — PROGRESS NOTES
Lab Results   Component Value Date/Time    TSH 0.615 02/11/2020 12:06 PM    TSH 0.35 (L) 08/24/2017 02:21 AM

## 2021-07-20 NOTE — PATIENT INSTRUCTIONS
Calcium Content of Selected Foods    Dairy and Soy Amount Calcium (mg)   Milk (skim, low fat, whole) 1 cup 300   Buttermilk 1 cup 300   [place] Cheese . 5 cup 65   Ice Cream or Ice Milk . 5 cup 100   Sour Cream, cultured 1 cup 250   Soy Milk, calcium fortified 1 cup 200 to 400   Yogurt 1 cup 450   Yogurt drink 12 oz 300   [place] Instant Breakfast 1 packet 250   Hot [place], calcium fortified 1 packet 320   Nonfat dry milk powder 5 Tbsp 300   Brie Cheese 1 oz 50   Hard Cheese (cheddar, lyle) 1 oz 200   Mozzarella 1 oz 200   Parmesan Cheese 1 Tbsp 70   Swiss or Gruyere 1 oz 270     Vegetables   [place] squash, cooked 1 cup 90   Arugula, raw 1 cup 125   Bok [place], raw 1 cup 40   Broccoli, cooked 1 cup 180   Chard or Okra, cooked 1 cup 100   Chicory (curly endive), raw 1 cup 40   Sam greens 1 cup 50   Corn, brine packed 1 cup 10   Dandelion greens, raw 1 cup 80   Kale, raw 1 cup 55   Kelp or Kombe 1 cup 60   Mustard greens 1 cup 40   Spinach, cooked 1 cup 240   Turnip greens, raw 1 cup 80     Fruits   Figs, dried, uncooked 1 cup 300   Kiwi, raw 1 cup 50   Orange juice, calcium fortified 8 oz 300   Orange juice, from concentrate 1 cup 20     Legumes   Garbanzo Beans, cooked 1 cup 80   Legumes, general, cooked . 5 cup 15 to 50   Moreno Beans, cooked 1 cup 75   Soybeans, boiled . 5 cup 100   Temphe . 5 cup 75   Tofu, firm, calcium set 4 oz 250 to 750   Tofu, soft regular 4 oz 120 to 390   White Beans, cooked . 5 cup 70     Grains   Cereals (calcium fortified) . 5 to 1 cup 250 to 1000   Amaranth, cooked . 5 cup 135   Bread, calcium fortified 1 slice 157 to 721   Brown rice, long grain, raw 1 cup 50   Oatmeal, instant 1 package 100 to 150   Tortillas, corn 2 85     Nuts and Seeds   Almonds, toasted unblanched 1 oz 80   Sesame seeds, whole roasted 1 oz 280   Sesame tahini 1 oz (2 Tbsp) 130   Sunflower seeds, dried 1 oz 50     Fish   Mackerel, canned 3 oz 250   [place], canned, with bones 3 oz 170 to 210   Sardines 3 oz 370     Other sabiha Ferrer 1 Tbsp 135   * When range is given, calcium content varies by product. * The calcium content of plant foods is varied. Most vegetables, legumes, nuts, seeds, and dried fruit contain some calcium. Listed are selected significant sources of well-absorbed calcium. References:   [employer], Handbook 8 palm program    Mina and [employer]     How Much Do You Need?    Age Calcium (mg)   3- 1year old 500 mg   3- 6year old 800 mg   5- 25year old 1300 mg   23- 48year old 1000 mg   46- 79year old 1200 mg   > 79year old 1200 mg     [email or web address]

## 2021-07-21 LAB
25(OH)D3+25(OH)D2 SERPL-MCNC: 48.6 NG/ML (ref 30–100)
ALBUMIN SERPL-MCNC: 4.1 G/DL (ref 3.5–4.6)
ALBUMIN/GLOB SERPL: 1.7 {RATIO} (ref 1.2–2.2)
ALP SERPL-CCNC: 71 IU/L (ref 48–121)
ALT SERPL-CCNC: 18 IU/L (ref 0–32)
AST SERPL-CCNC: 30 IU/L (ref 0–40)
BILIRUB SERPL-MCNC: <0.2 MG/DL (ref 0–1.2)
BUN SERPL-MCNC: 13 MG/DL (ref 10–36)
BUN/CREAT SERPL: 14 (ref 12–28)
CALCIUM SERPL-MCNC: 9.2 MG/DL (ref 8.7–10.3)
CHLORIDE SERPL-SCNC: 106 MMOL/L (ref 96–106)
CO2 SERPL-SCNC: 27 MMOL/L (ref 20–29)
CREAT SERPL-MCNC: 0.94 MG/DL (ref 0.57–1)
GLOBULIN SER CALC-MCNC: 2.4 G/DL (ref 1.5–4.5)
GLUCOSE SERPL-MCNC: 104 MG/DL (ref 65–99)
POTASSIUM SERPL-SCNC: 5.1 MMOL/L (ref 3.5–5.2)
PROT SERPL-MCNC: 6.5 G/DL (ref 6–8.5)
SODIUM SERPL-SCNC: 145 MMOL/L (ref 134–144)

## 2021-07-21 NOTE — LETTER
7/21/2021    [address] [facility] [address]      Dear Jaleesa Aguilar:    Please find your most recent results below. Resulted Orders   VITAMIN D, 25 HYDROXY   Result Value Ref Range    VITAMIN D, 25-HYDROXY 48.6 30.0 - 100.0 ng/mL    Narrative    Performed at:  [address]  [id number]  : Stephanie Tay MD, Phone:  [phone]   METABOLIC PANEL, COMPREHENSIVE   Result Value Ref Range    Glucose 104 (H) 65 - 99 mg/dL    BUN 13 10 - 36 mg/dL    Creatinine 0.94 0.57 - 1.00 mg/dL    GFR est non-AA 52 (L) >59 mL/min/1.73    GFR est AA 60 >59 mL/min/1.73    BUN/Creatinine ratio 14 12 - 28    Sodium 145 (H) 134 - 144 mmol/L    Potassium 5.1 3.5 - 5.2 mmol/L    Chloride 106 96 - 106 mmol/L    CO2 27 20 - 29 mmol/L    Calcium 9.2 8.7 - 10.3 mg/dL    Protein, total 6.5 6.0 - 8.5 g/dL    Albumin 4.1 3.5 - 4.6 g/dL    GLOBULIN, TOTAL 2.4 1.5 - 4.5 g/dL    A-G Ratio 1.7 1.2 - 2.2    Bilirubin, total <0.2 0.0 - 1.2 mg/dL    Alk. phosphatase 71 48 - 121 IU/L    AST (SGOT) 30 0 - 40 IU/L    ALT (SGPT) 18 0 - 32 IU/L    Narrative    Performed at:  [address]  [id number]  : Stephanie Tay MD, Phone:  [phone]       RECOMMENDATIONS:    Reather Border, and Michael Arreguin,    Your vitamin D level is normal.  Please continue your current supplementation dose. Your kidney function is normal for a 80year-old as are your electrolytes. Sodium is a touch evaded which suggests you may be a little bit dehydrated but nothing to worry about. I will order Prolia and you will be called to schedule this. Please do not forget that this medication must be given every 6 months and cannot be discontinued. Please let me know if you develop numbness or tingling around your lips or in your fingertips after receiving this medication.   That could be a sign of hypocalcemia and would require immediate treatment.     Please call me if you have any questions: [phone]    Sincerely,      Faustino Cheng MD

## 2021-08-06 ENCOUNTER — HOSPITAL ENCOUNTER (OUTPATIENT)
Dept: INFUSION THERAPY | Age: 86
Discharge: HOME | End: 2021-08-06
Payer: MEDICARE

## 2021-08-06 VITALS
RESPIRATION RATE: 18 BRPM | HEART RATE: 69 BPM | SYSTOLIC BLOOD PRESSURE: 151 MMHG | DIASTOLIC BLOOD PRESSURE: 71 MMHG | TEMPERATURE: 97.5 F

## 2021-08-06 PROCEDURE — 96372 THER/PROPH/DIAG INJ SC/IM: CPT

## 2021-08-06 PROCEDURE — 74011250636 HC RX REV CODE- 250/636: Performed by: INTERNAL MEDICINE

## 2021-08-06 RX ADMIN — DENOSUMAB 60 MG: 60 INJECTION SUBCUTANEOUS at 11:31

## 2021-08-30 ENCOUNTER — APPOINTMENT (OUTPATIENT)
Dept: INFUSION THERAPY | Age: 86
End: 2021-08-30

## 2021-09-10 RX ORDER — OMEPRAZOLE 20 MG/1
CAPSULE, DELAYED RELEASE ORAL
Qty: 180 CAPSULE | Refills: 1 | Status: SHIPPED | OUTPATIENT
Start: 2021-09-10 | End: 2022-02-10

## 2021-10-13 NOTE — TELEPHONE ENCOUNTER
Patients daughter called and requested a refill for patients proair inhaler.  Do not see on med list

## 2021-10-31 DIAGNOSIS — E78.00 PURE HYPERCHOLESTEROLEMIA: ICD-10-CM

## 2021-11-01 RX ORDER — LEVOTHYROXINE SODIUM 50 UG/1
TABLET ORAL
Qty: 90 TABLET | Refills: 1 | Status: SHIPPED | OUTPATIENT
Start: 2021-11-01 | End: 2022-05-02

## 2021-11-01 RX ORDER — ROSUVASTATIN CALCIUM 20 MG/1
TABLET, COATED ORAL
Qty: 90 TABLET | Refills: 1 | Status: SHIPPED | OUTPATIENT
Start: 2021-11-01 | End: 2022-05-26

## 2021-11-01 RX ORDER — LOSARTAN POTASSIUM 50 MG/1
TABLET ORAL
Qty: 90 TABLET | Refills: 1 | Status: SHIPPED | OUTPATIENT
Start: 2021-11-01 | End: 2022-05-02

## 2021-12-01 ENCOUNTER — OFFICE VISIT (OUTPATIENT)
Dept: FAMILY MEDICINE CLINIC | Age: 86
End: 2021-12-01
Payer: MEDICARE

## 2021-12-01 VITALS
HEIGHT: 64 IN | RESPIRATION RATE: 16 BRPM | OXYGEN SATURATION: 98 % | SYSTOLIC BLOOD PRESSURE: 122 MMHG | WEIGHT: 125 LBS | HEART RATE: 72 BPM | DIASTOLIC BLOOD PRESSURE: 64 MMHG | BODY MASS INDEX: 21.34 KG/M2 | TEMPERATURE: 97.1 F

## 2021-12-01 DIAGNOSIS — M79.675 PAIN OF TOE OF LEFT FOOT: Primary | ICD-10-CM

## 2021-12-01 PROCEDURE — G8432 DEP SCR NOT DOC, RNG: HCPCS | Performed by: NURSE PRACTITIONER

## 2021-12-01 PROCEDURE — G8427 DOCREV CUR MEDS BY ELIG CLIN: HCPCS | Performed by: NURSE PRACTITIONER

## 2021-12-01 PROCEDURE — G8420 CALC BMI NORM PARAMETERS: HCPCS | Performed by: NURSE PRACTITIONER

## 2021-12-01 PROCEDURE — 99213 OFFICE O/P EST LOW 20 MIN: CPT | Performed by: NURSE PRACTITIONER

## 2021-12-01 PROCEDURE — G0463 HOSPITAL OUTPT CLINIC VISIT: HCPCS | Performed by: NURSE PRACTITIONER

## 2021-12-01 PROCEDURE — 1101F PT FALLS ASSESS-DOCD LE1/YR: CPT | Performed by: NURSE PRACTITIONER

## 2021-12-01 PROCEDURE — 1090F PRES/ABSN URINE INCON ASSESS: CPT | Performed by: NURSE PRACTITIONER

## 2021-12-01 PROCEDURE — G8536 NO DOC ELDER MAL SCRN: HCPCS | Performed by: NURSE PRACTITIONER

## 2021-12-01 NOTE — PROGRESS NOTES
Identified pt with two pt identifiers(name and ). Chief Complaint   Patient presents with    Toe Pain     sore on left toe        Health Maintenance Due   Topic    Shingrix Vaccine Age 50> (1 of 2)    Pneumococcal 65+ years (2 of 2 - PPSV23)    Lipid Screen     Eye Exam Retinal or Dilated     MICROALBUMIN Q1     Flu Vaccine (1)    COVID-19 Vaccine (3 - Booster for Moderna series)       Wt Readings from Last 3 Encounters:   21 125 lb (56.7 kg)   21 124 lb 11.2 oz (56.6 kg)   21 132 lb (59.9 kg)     Temp Readings from Last 3 Encounters:   21 97.1 °F (36.2 °C) (Temporal)   21 97.5 °F (36.4 °C)   21 98.1 °F (36.7 °C) (Temporal)     BP Readings from Last 3 Encounters:   21 122/64   21 (!) 151/71   21 121/68     Pulse Readings from Last 3 Encounters:   21 72   21 69   21 60         Learning Assessment:  :     Learning Assessment 2014   PRIMARY LEARNER Patient   HIGHEST LEVEL OF EDUCATION - PRIMARY LEARNER  DID NOT GRADUATE HIGH SCHOOL   BARRIERS PRIMARY LEARNER NONE   CO-LEARNER CAREGIVER No   PRIMARY LANGUAGE ENGLISH   LEARNER PREFERENCE PRIMARY LISTENING   ANSWERED BY patient    RELATIONSHIP SELF       Depression Screening:  :     3 most recent PHQ Screens 2021   Little interest or pleasure in doing things Not at all   Feeling down, depressed, irritable, or hopeless Not at all   Total Score PHQ 2 0       Fall Risk Assessment:  :     Fall Risk Assessment, last 12 mths 2021   Able to walk? Yes   Fall in past 12 months? 0   Do you feel unsteady? 1   Are you worried about falling 0   Is the gait abnormal? 0   Number of falls in past 12 months -   Fall with injury? -       Abuse Screening:  :     Abuse Screening Questionnaire 2021 2021 2020 3/12/2019 10/17/2018 2018 2017   Do you ever feel afraid of your partner?  N N N N N N N   Are you in a relationship with someone who physically or mentally threatens you? N N N N N N N   Is it safe for you to go home? Y Y Y Y Y Y Y       Coordination of Care Questionnaire:  :     1) Have you been to an emergency room, urgent care clinic since your last visit? no   Hospitalized since your last visit? no             2) Have you seen or consulted any other health care providers outside of [address] [place] [address] since your last visit? no  (Include any pap smears or colon screenings in this section.)    3) Do you have an Advance Directive on file? yes  Are you interested in receiving information about Advance Directives? no    Patient is accompanied by daughter I have received verbal consent from Melinda David to discuss any/all medical information while they are present in the room. Reviewed record in preparation for visit and have obtained necessary documentation.

## 2021-12-01 NOTE — PROGRESS NOTES
HISTORY OF PRESENT ILLNESS  Darby Quach is a 80 y.o. female. HPI   Pt presents with \"toe pain\"  Pt states that she has a knot on the inside of her bit toe on her left foot  It has been there for about 6 months  It will rub and cause a lot of pain  She has put aspercream on the area, with no relief  Review of Systems   Constitutional: Negative for fever. Physical Exam  Constitutional:       Appearance: Normal appearance. Cardiovascular:      Rate and Rhythm: Normal rate and regular rhythm. Heart sounds: Normal heart sounds. Pulmonary:      Effort: Pulmonary effort is normal.      Breath sounds: Normal breath sounds. Musculoskeletal:        Feet:    Neurological:      Mental Status: She is alert. Psychiatric:         Mood and Affect: Mood normal.         Behavior: Behavior normal.         ASSESSMENT and PLAN      ICD-10-CM ICD-9-CM    1. Pain of toe of left foot  M79.675 729.5 REFERRAL TO PODIATRY     Educated about calling podiatry for an appointment today    Pt informed to return to office with worsening of symptoms, or PRN with any questions or concerns. Pt verbalizes understanding of plan of care and denies further questions or concerns at this time.

## 2022-02-10 RX ORDER — OMEPRAZOLE 20 MG/1
CAPSULE, DELAYED RELEASE ORAL
Qty: 180 CAPSULE | Refills: 1 | Status: SHIPPED | OUTPATIENT
Start: 2022-02-10 | End: 2022-08-07

## 2022-02-25 ENCOUNTER — HOSPITAL ENCOUNTER (OUTPATIENT)
Dept: INFUSION THERAPY | Age: 87
Discharge: HOME | End: 2022-02-25
Payer: MEDICARE

## 2022-02-25 VITALS
TEMPERATURE: 97.1 F | DIASTOLIC BLOOD PRESSURE: 70 MMHG | RESPIRATION RATE: 18 BRPM | OXYGEN SATURATION: 99 % | HEART RATE: 66 BPM | SYSTOLIC BLOOD PRESSURE: 134 MMHG

## 2022-02-25 LAB
ALBUMIN SERPL-MCNC: 3 G/DL (ref 3.5–5)
ALBUMIN/GLOB SERPL: 1 {RATIO} (ref 1.1–2.2)
ALP SERPL-CCNC: 45 U/L (ref 45–117)
ALT SERPL-CCNC: 18 U/L (ref 12–78)
ANION GAP SERPL CALC-SCNC: 1 MMOL/L (ref 5–15)
AST SERPL-CCNC: 25 U/L (ref 15–37)
BILIRUB SERPL-MCNC: 0.2 MG/DL (ref 0.2–1)
BUN SERPL-MCNC: 14 MG/DL (ref 6–20)
BUN/CREAT SERPL: 12 (ref 12–20)
CALCIUM SERPL-MCNC: 8.4 MG/DL (ref 8.5–10.1)
CHLORIDE SERPL-SCNC: 116 MMOL/L (ref 97–108)
CO2 SERPL-SCNC: 26 MMOL/L (ref 21–32)
CREAT SERPL-MCNC: 1.17 MG/DL (ref 0.55–1.02)
GLOBULIN SER CALC-MCNC: 3.1 G/DL (ref 2–4)
GLUCOSE SERPL-MCNC: 80 MG/DL (ref 65–100)
MAGNESIUM SERPL-MCNC: 2.4 MG/DL (ref 1.6–2.4)
PHOSPHATE SERPL-MCNC: 3 MG/DL (ref 2.6–4.7)
POTASSIUM SERPL-SCNC: 4.4 MMOL/L (ref 3.5–5.1)
PROT SERPL-MCNC: 6.1 G/DL (ref 6.4–8.2)
SODIUM SERPL-SCNC: 143 MMOL/L (ref 136–145)

## 2022-02-25 PROCEDURE — 36415 COLL VENOUS BLD VENIPUNCTURE: CPT

## 2022-02-25 PROCEDURE — 96372 THER/PROPH/DIAG INJ SC/IM: CPT

## 2022-02-25 PROCEDURE — 83735 ASSAY OF MAGNESIUM: CPT

## 2022-02-25 PROCEDURE — 84100 ASSAY OF PHOSPHORUS: CPT

## 2022-02-25 PROCEDURE — 80053 COMPREHEN METABOLIC PANEL: CPT

## 2022-02-25 PROCEDURE — 74011250636 HC RX REV CODE- 250/636: Performed by: INTERNAL MEDICINE

## 2022-02-25 RX ADMIN — DENOSUMAB 60 MG: 60 INJECTION SUBCUTANEOUS at 12:28

## 2022-02-25 NOTE — PROGRESS NOTES
Outpatient Infusion [facility] Progress Note    1674 Pt admit to [facility] for Prolia ambulatory in stable condition. Assessment completed. No new concerns voiced. Labs drawn per order and sent. Visit Vitals  /70   Pulse 66   Temp 97.1 °F (36.2 °C)   Resp 18   SpO2 99%   Breastfeeding No     Recent Results (from the past 12 hour(s))   METABOLIC PANEL, COMPREHENSIVE    Collection Time: 02/25/22 11:04 AM   Result Value Ref Range    Sodium 143 136 - 145 mmol/L    Potassium 4.4 3.5 - 5.1 mmol/L    Chloride 116 (H) 97 - 108 mmol/L    CO2 26 21 - 32 mmol/L    Anion gap 1 (L) 5 - 15 mmol/L    Glucose 80 65 - 100 mg/dL    BUN 14 6 - 20 MG/DL    Creatinine 1.17 (H) 0.55 - 1.02 MG/DL    BUN/Creatinine ratio 12 12 - 20      GFR est AA 52 (L) >60 ml/min/1.73m2    GFR est non-AA 43 (L) >60 ml/min/1.73m2    Calcium 8.4 (L) 8.5 - 10.1 MG/DL    Bilirubin, total 0.2 0.2 - 1.0 MG/DL    ALT (SGPT) 18 12 - 78 U/L    AST (SGOT) 25 15 - 37 U/L    Alk. phosphatase 45 45 - 117 U/L    Protein, total 6.1 (L) 6.4 - 8.2 g/dL    Albumin 3.0 (L) 3.5 - 5.0 g/dL    Globulin 3.1 2.0 - 4.0 g/dL    A-G Ratio 1.0 (L) 1.1 - 2.2     MAGNESIUM    Collection Time: 02/25/22 11:04 AM   Result Value Ref Range    Magnesium 2.4 1.6 - 2.4 mg/dL   PHOSPHORUS    Collection Time: 02/25/22 11:04 AM   Result Value Ref Range    Phosphorus 3.0 2.6 - 4.7 MG/DL         Medications:  Medications Administered     denosumab (PROLIA) injection 60 mg     Admin Date  02/25/2022 Action  Given Dose  60 mg Route  SubCUTAneous Administered By  Kiana Bajwa RN            SC left arm      1230 Pt tolerated treatment well. D/c home ambulatory in no distress.  Pt aware of next appointment scheduled for   Future Appointments   Date Time Provider Patrice Moore   7/20/2022 12:10 PM Zo Villatoro MD RDE [facility] AMB   8/29/2022 11:00 AM  TIA LI [facility]

## 2022-02-28 ENCOUNTER — APPOINTMENT (OUTPATIENT)
Dept: INFUSION THERAPY | Age: 87
End: 2022-02-28

## 2022-03-14 ENCOUNTER — OFFICE VISIT (OUTPATIENT)
Dept: FAMILY MEDICINE CLINIC | Age: 87
End: 2022-03-14
Payer: MEDICARE

## 2022-03-14 VITALS
HEART RATE: 62 BPM | TEMPERATURE: 97.5 F | OXYGEN SATURATION: 100 % | HEIGHT: 64 IN | DIASTOLIC BLOOD PRESSURE: 72 MMHG | WEIGHT: 126 LBS | BODY MASS INDEX: 21.51 KG/M2 | RESPIRATION RATE: 20 BRPM | SYSTOLIC BLOOD PRESSURE: 128 MMHG

## 2022-03-14 DIAGNOSIS — R73.09 ELEVATED GLUCOSE: ICD-10-CM

## 2022-03-14 DIAGNOSIS — Z00.00 MEDICARE ANNUAL WELLNESS VISIT, SUBSEQUENT: Primary | ICD-10-CM

## 2022-03-14 DIAGNOSIS — E78.00 PURE HYPERCHOLESTEROLEMIA: ICD-10-CM

## 2022-03-14 DIAGNOSIS — E55.9 VITAMIN D DEFICIENCY: ICD-10-CM

## 2022-03-14 DIAGNOSIS — E02 SUBCLINICAL IODINE-DEFICIENCY HYPOTHYROIDISM: ICD-10-CM

## 2022-03-14 DIAGNOSIS — G89.29 CHRONIC MIDLINE LOW BACK PAIN WITHOUT SCIATICA: ICD-10-CM

## 2022-03-14 DIAGNOSIS — F41.1 GENERALIZED ANXIETY DISORDER: ICD-10-CM

## 2022-03-14 DIAGNOSIS — M54.50 CHRONIC MIDLINE LOW BACK PAIN WITHOUT SCIATICA: ICD-10-CM

## 2022-03-14 PROCEDURE — G8536 NO DOC ELDER MAL SCRN: HCPCS | Performed by: INTERNAL MEDICINE

## 2022-03-14 PROCEDURE — 1090F PRES/ABSN URINE INCON ASSESS: CPT | Performed by: INTERNAL MEDICINE

## 2022-03-14 PROCEDURE — G0439 PPPS, SUBSEQ VISIT: HCPCS | Performed by: INTERNAL MEDICINE

## 2022-03-14 PROCEDURE — 1101F PT FALLS ASSESS-DOCD LE1/YR: CPT | Performed by: INTERNAL MEDICINE

## 2022-03-14 PROCEDURE — G8510 SCR DEP NEG, NO PLAN REQD: HCPCS | Performed by: INTERNAL MEDICINE

## 2022-03-14 PROCEDURE — 99214 OFFICE O/P EST MOD 30 MIN: CPT | Performed by: INTERNAL MEDICINE

## 2022-03-14 PROCEDURE — G8420 CALC BMI NORM PARAMETERS: HCPCS | Performed by: INTERNAL MEDICINE

## 2022-03-14 PROCEDURE — G8427 DOCREV CUR MEDS BY ELIG CLIN: HCPCS | Performed by: INTERNAL MEDICINE

## 2022-03-14 PROCEDURE — G0463 HOSPITAL OUTPT CLINIC VISIT: HCPCS | Performed by: INTERNAL MEDICINE

## 2022-03-14 RX ORDER — BUSPIRONE HYDROCHLORIDE 5 MG/1
5 TABLET ORAL 2 TIMES DAILY
Qty: 60 TABLET | Refills: 0 | Status: SHIPPED | OUTPATIENT
Start: 2022-03-14 | End: 2022-04-07 | Stop reason: SDUPTHER

## 2022-03-14 NOTE — PROGRESS NOTES
Chief Complaint   Patient presents with    Annual Wellness Visit    Back Pain      This is the Subsequent Medicare Annual Wellness Exam, performed 12 months or more after the Initial AWV or the last Subsequent AWV    I have reviewed the patient's medical history in detail and updated the computerized patient record. Assessment/Plan   Education and counseling provided:  Are appropriate based on today's review and evaluation  End-of-Life planning (with patient's consent)  Pneumococcal Vaccine  Influenza Vaccine  Cardiovascular screening blood test  Bone mass measurement (DEXA)  Screening for glaucoma  Diabetes screening test    Diagnoses and all orders for this visit:    1. Medicare annual wellness visit, subsequent   Anticipatory guidance discussed. Immunizations reviewed   HM updated. 2. Pure hypercholesterolemia  -     METABOLIC PANEL, COMPREHENSIVE; Future  -     CBC WITH AUTOMATED DIFF; Future  -     LIPID PANEL; Future    3. Vitamin D deficiency  -     VITAMIN D, 25 HYDROXY; Future    4. Subclinical iodine-deficiency hypothyroidism  -     TSH 3RD GENERATION; Future  -     T4 (THYROXINE); Future  -     MICROALBUMIN, UR, RAND W/ MICROALB/CREAT RATIO; Future    5. Elevated glucose  -     HEMOGLOBIN A1C WITH EAG; Future    6. Chronic midline low back pain without sciatica  -     REFERRAL TO ORTHOPEDICS    7. Generalized anxiety disorder  -     busPIRone (BUSPAR) 5 mg tablet; Take 1 Tablet by mouth two (2) times a day for 30 days. I have discussed the diagnosis with the patient and the intended treatment plan as seen in the above orders. The patient has received an after-visit summary and questions were answered concerning future plans. Asked to return should symptoms worsen or not improve with treatment. Any pending labs and studies will be relayed to patient when they become available. Pt verbalizes understanding of plan of care and denies further questions or concerns at this time. Follow-up and Dispositions    · Return in about 1 year (around 3/14/2023), or if symptoms worsen or fail to improve, for Follow up 6 months for chronic issues. Depression Risk Factor Screening     3 most recent PHQ Screens 3/14/2022   Little interest or pleasure in doing things Not at all   Feeling down, depressed, irritable, or hopeless Not at all   Total Score PHQ 2 0       Alcohol & Drug Abuse Risk Screen    Do you average more than 1 drink per night or more than 7 drinks a week:  No    On any one occasion in the past three months have you have had more than 3 drinks containing alcohol:  No          Functional Ability and Level of Safety    Hearing: The patient wears hearing aids. Activities of Daily Living: The home contains: handrails and grab bars  Patient needs help with:  transportation and managing money      Ambulation: with mild difficulty     Fall Risk:  Fall Risk Assessment, last 12 mths 3/14/2022   Able to walk? Yes   Fall in past 12 months? 1   Do you feel unsteady? 1   Are you worried about falling 0   Is the gait abnormal? 0   Number of falls in past 12 months 1   Fall with injury? 0      Abuse Screen:  Patient is not abused       Cognitive Screening    Has your family/caregiver stated any concerns about your memory: no   Cognitive Screening: Normal - Clock Drawing Test, Mini Cog Test     Visit Vitals  /72 (BP 1 Location: Right arm, BP Patient Position: Sitting, BP Cuff Size: Adult)   Pulse 62   Temp 97.5 °F (36.4 °C) (Temporal)   Resp 20   Ht 5' 4\" (1.626 m)   Wt 126 lb (57.2 kg)   SpO2 100%   BMI 21.63 kg/m²     General appearance: alert, well appearing, and in no distress. Chest: clear to auscultation, no wheezes, rales or rhonchi, symmetric air entry. CVS exam: normal rate, regular rhythm, normal S1, S2, no murmurs, rubs, clicks or gallops. Abdominal exam: soft, nontender, nondistended, no masses or organomegaly.   Exam of extremities: peripheral pulses normal, no pedal edema, no clubbing or cyanosis  Skin exam - normal coloration and turgor, no rashes, no suspicious skin lesions noted. Neurological exam reveals alert, oriented, normal speech, no focal findings or movement disorder noted. Diabetic foot exam:      Left Foot:              Visual Exam: normal               Pulse DP: 2+ (normal)              Filament test: normal sensation               Vibratory sensation: Vibratory sensation: normal                       Right Foot:              Visual Exam: normal               Pulse DP: 2+ (normal)              Filament test: normal sensation               Vibratory sensation: Vibratory sensation: normal      Health Maintenance Due     Health Maintenance Due   Topic Date Due    Pneumococcal 65+ years (2 of 2 - PPSV23) Completed    Lipid Screen  Ordered    Eye Exam Retinal or Dilated  Discussed. Reports that she has had an eye exam. We need the report    MICROALBUMIN Q1  Ordered    Foot Exam Q1  Done       Patient Care Team   Patient Care Team:  Mike Beach MD as PCP - General  Mike Beach MD as PCP - [facility]  Giuliana Avelar RN as Ambulatory Care Manager    History     Patient Active Problem List   Diagnosis Code    Type 2 diabetes mellitus without complication, without long-term current use of insulin ([address] E11.9    Essential hypertension, benign I10    Hyperlipidemia E78.5    Hypothyroidism E03.9    Allergic rhinitis, cause unspecified J30.9    Chronic back pain M54.9, G89.29    Osteoporosis M81.0    Fall at home [address] 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    Controlled type 2 diabetes mellitus without complication (HCC) G69.0     Past Medical History:   Diagnosis Date    Allergic rhinitis     Chronic back pain     Diabetes ([address]     Hypercholesterolemia     Hypertension     Thyroid disease       Past Surgical History:   Procedure Laterality Date    HX CHOLECYSTECTOMY      LA BREAST SURGERY PROCEDURE UNLISTED       Current Outpatient Medications   Medication Sig Dispense Refill    omeprazole (PRILOSEC) 20 mg capsule TAKE 2 CAPSULES BY MOUTH EVERY  Capsule 1    levothyroxine (SYNTHROID) 50 mcg tablet TAKE 1 TABLET BY MOUTH EVERY DAY BEFORE BREAKFAST 90 Tablet 1    losartan (COZAAR) 50 mg tablet TAKE 1 TABLET BY MOUTH EVERY DAY 90 Tablet 1    rosuvastatin (CRESTOR) 20 mg tablet TAKE 1 TABLET BY MOUTH EVERY DAY 90 Tablet 1    albuterol sulfate (ProAir RespiClick) 90 mcg/actuation breath activated inhaler Take 1 Puff by inhalation every four (4) hours as needed for Wheezing or Cough. (R05); (R06.2) 1 Each 5    ILEVRO 0.3 % drps STARTING DAY BEFORE SURGERY, INSTILL 1 DROP IN RIGHT EYE DAILY (Patient not taking: Reported on 7/20/2021)  1    cholecalciferol, vitamin d3, (VITAMIN D) 1,000 unit tablet Take 1,000 Units by mouth daily.  calcium carbonate (CALCIUM 500) 500 mg (1,250 mg) tablet Take 1,250 mg by mouth daily.  aspirin delayed-release 81 mg tablet take 81 mg by mouth daily. Allergies   Allergen Reactions    Advil Pm  [Ibuprofen-Diphenhydramine Cit]      Other reaction(s):  Adverse reaction to substance , Hives    Codeine Other (comments)     Causes stomach pains       Family History   Problem Relation Age of Onset    Heart Disease Mother     Heart Disease Father     Diabetes Paternal Grandfather      Social History     Tobacco Use    Smoking status: Never Smoker    Smokeless tobacco: Never Used   Substance Use Topics    Alcohol use: No     Alcohol/week: 0.0 standard drinks     Chris Clarke MD

## 2022-03-14 NOTE — PROGRESS NOTES
Identified pt with two pt identifiers(name and ). Chief Complaint   Patient presents with    Annual Wellness Visit    Back Pain        Health Maintenance Due   Topic    Shingrix Vaccine Age 49> (1 of 2)    Pneumococcal 65+ years (2 of 2 - PPSV23)    Lipid Screen     Eye Exam Retinal or Dilated     MICROALBUMIN Q1     COVID-19 Vaccine (3 - Booster for Moderna series)    Foot Exam Q1        Wt Readings from Last 3 Encounters:   22 126 lb (57.2 kg)   21 125 lb (56.7 kg)   21 124 lb 11.2 oz (56.6 kg)     Temp Readings from Last 3 Encounters:   22 97.5 °F (36.4 °C) (Temporal)   22 97.1 °F (36.2 °C)   21 97.1 °F (36.2 °C) (Temporal)     BP Readings from Last 3 Encounters:   22 128/72   22 134/70   21 122/64     Pulse Readings from Last 3 Encounters:   22 62   22 66   21 72         Learning Assessment:  :     Learning Assessment 2014   PRIMARY LEARNER Patient   HIGHEST LEVEL OF EDUCATION - PRIMARY LEARNER  DID NOT GRADUATE HIGH SCHOOL   BARRIERS PRIMARY LEARNER NONE   CO-LEARNER CAREGIVER No   PRIMARY LANGUAGE ENGLISH   LEARNER PREFERENCE PRIMARY LISTENING   ANSWERED BY patient    RELATIONSHIP SELF       Depression Screening:  :     3 most recent PHQ Screens 3/14/2022   Little interest or pleasure in doing things Not at all   Feeling down, depressed, irritable, or hopeless Not at all   Total Score PHQ 2 0       Fall Risk Assessment:  :     Fall Risk Assessment, last 12 mths 3/14/2022   Able to walk? Yes   Fall in past 12 months? 1   Do you feel unsteady? 1   Are you worried about falling 0   Is the gait abnormal? 0   Number of falls in past 12 months 1   Fall with injury? 0       Abuse Screening:  :     Abuse Screening Questionnaire 3/14/2022 2021 2021 2020 3/12/2019 10/17/2018 2018   Do you ever feel afraid of your partner?  N N N N N N N   Are you in a relationship with someone who physically or mentally threatens you? N N N N N N N   Is it safe for you to go home? Y Y Y Y Y Y Y       Coordination of Care Questionnaire:  :     1) Have you been to an emergency room, urgent care clinic since your last visit? no   Hospitalized since your last visit? no             2) Have you seen or consulted any other health care providers outside of [address] [place] [address] since your last visit? no  (Include any pap smears or colon screenings in this section.)    3) Do you have an Advance Directive on file? yes  Are you interested in receiving information about Advance Directives? no    Patient is accompanied by daughter I have received verbal consent from Dori Astorga to discuss any/all medical information while they are present in the room. 4.  For patients aged 39-70: Has the patient had a colonoscopy / FIT/ Cologuard? NA - based on age      If the patient is female:    11. For patients aged 41-77: Has the patient had a mammogram within the past 2 years? NA - based on age or sex      10. For patients aged 21-65: Has the patient had a pap smear?  NA - based on age or sex

## 2022-03-14 NOTE — ASSESSMENT & PLAN NOTE
well controlled, continue current medications, continue current treatment plan  Lab Results   Component Value Date/Time    Hemoglobin A1c 5.8 (H) 11/01/2018 09:43 AM    Hemoglobin A1c (POC) 5.5 03/25/2015 02:33 PM

## 2022-03-16 ENCOUNTER — LAB ONLY (OUTPATIENT)
Dept: FAMILY MEDICINE CLINIC | Age: 87
End: 2022-03-16

## 2022-03-16 DIAGNOSIS — R73.09 ELEVATED GLUCOSE: ICD-10-CM

## 2022-03-16 DIAGNOSIS — E78.00 PURE HYPERCHOLESTEROLEMIA: ICD-10-CM

## 2022-03-16 DIAGNOSIS — E55.9 VITAMIN D DEFICIENCY: ICD-10-CM

## 2022-03-16 DIAGNOSIS — E02 SUBCLINICAL IODINE-DEFICIENCY HYPOTHYROIDISM: ICD-10-CM

## 2022-03-17 LAB
25(OH)D3 SERPL-MCNC: 46.7 NG/ML (ref 30–100)
ALBUMIN SERPL-MCNC: 3.9 G/DL (ref 3.5–5)
ALBUMIN/GLOB SERPL: 1.4 {RATIO} (ref 1.1–2.2)
ALP SERPL-CCNC: 52 U/L (ref 45–117)
ALT SERPL-CCNC: 22 U/L (ref 12–78)
ANION GAP SERPL CALC-SCNC: 5 MMOL/L (ref 5–15)
AST SERPL-CCNC: 29 U/L (ref 15–37)
BASOPHILS # BLD: 0.1 K/UL (ref 0–0.1)
BASOPHILS NFR BLD: 1 % (ref 0–1)
BILIRUB SERPL-MCNC: 0.4 MG/DL (ref 0.2–1)
BUN SERPL-MCNC: 13 MG/DL (ref 6–20)
BUN/CREAT SERPL: 13 (ref 12–20)
CALCIUM SERPL-MCNC: 8.8 MG/DL (ref 8.5–10.1)
CHLORIDE SERPL-SCNC: 111 MMOL/L (ref 97–108)
CHOLEST SERPL-MCNC: 120 MG/DL
CO2 SERPL-SCNC: 26 MMOL/L (ref 21–32)
CREAT SERPL-MCNC: 1.01 MG/DL (ref 0.55–1.02)
CREAT UR-MCNC: 105 MG/DL
DIFFERENTIAL METHOD BLD: ABNORMAL
EOSINOPHIL # BLD: 0.1 K/UL (ref 0–0.4)
EOSINOPHIL NFR BLD: 2 % (ref 0–7)
ERYTHROCYTE [DISTWIDTH] IN BLOOD BY AUTOMATED COUNT: 14.3 % (ref 11.5–14.5)
EST. AVERAGE GLUCOSE BLD GHB EST-MCNC: 108 MG/DL
GLOBULIN SER CALC-MCNC: 2.7 G/DL (ref 2–4)
GLUCOSE SERPL-MCNC: 89 MG/DL (ref 65–100)
HBA1C MFR BLD: 5.4 % (ref 4–5.6)
HCT VFR BLD AUTO: 47.4 % (ref 35–47)
HDLC SERPL-MCNC: 44 MG/DL
HDLC SERPL: 2.7 {RATIO} (ref 0–5)
HGB BLD-MCNC: 14.5 G/DL (ref 11.5–16)
IMM GRANULOCYTES # BLD AUTO: 0 K/UL (ref 0–0.04)
IMM GRANULOCYTES NFR BLD AUTO: 0 % (ref 0–0.5)
LDLC SERPL CALC-MCNC: 41.6 MG/DL (ref 0–100)
LYMPHOCYTES # BLD: 1.7 K/UL (ref 0.8–3.5)
LYMPHOCYTES NFR BLD: 29 % (ref 12–49)
MCH RBC QN AUTO: 31.2 PG (ref 26–34)
MCHC RBC AUTO-ENTMCNC: 30.6 G/DL (ref 30–36.5)
MCV RBC AUTO: 101.9 FL (ref 80–99)
MICROALBUMIN UR-MCNC: 1.64 MG/DL
MICROALBUMIN/CREAT UR-RTO: 16 MG/G (ref 0–30)
MONOCYTES # BLD: 0.5 K/UL (ref 0–1)
MONOCYTES NFR BLD: 8 % (ref 5–13)
NEUTS SEG # BLD: 3.5 K/UL (ref 1.8–8)
NEUTS SEG NFR BLD: 60 % (ref 32–75)
NRBC # BLD: 0 K/UL (ref 0–0.01)
NRBC BLD-RTO: 0 PER 100 WBC
PLATELET # BLD AUTO: 195 K/UL (ref 150–400)
PMV BLD AUTO: 11.3 FL (ref 8.9–12.9)
POTASSIUM SERPL-SCNC: 4.1 MMOL/L (ref 3.5–5.1)
PROT SERPL-MCNC: 6.6 G/DL (ref 6.4–8.2)
RBC # BLD AUTO: 4.65 M/UL (ref 3.8–5.2)
SODIUM SERPL-SCNC: 142 MMOL/L (ref 136–145)
T4 SERPL-MCNC: 10.6 UG/DL (ref 4.8–13.9)
TRIGL SERPL-MCNC: 172 MG/DL (ref ?–150)
TSH SERPL DL<=0.05 MIU/L-ACNC: 0.51 UIU/ML (ref 0.36–3.74)
VLDLC SERPL CALC-MCNC: 34.4 MG/DL
WBC # BLD AUTO: 5.8 K/UL (ref 3.6–11)

## 2022-03-19 PROBLEM — J18.9 CAP (COMMUNITY ACQUIRED PNEUMONIA): Status: ACTIVE | Noted: 2017-08-23

## 2022-03-26 NOTE — PROGRESS NOTES
Labs are stable and showed no worrisome trends at this time. Continue with chronic medications. Follow up as needed and every 6-months for chronic issues. Yearly for AWV.      Thanks,   Dr. Sabine Rivera

## 2022-03-29 ENCOUNTER — TELEPHONE (OUTPATIENT)
Dept: FAMILY MEDICINE CLINIC | Age: 87
End: 2022-03-29

## 2022-03-29 NOTE — TELEPHONE ENCOUNTER
----- Message from Courtney Gaona MD sent at 3/26/2022 12:58 PM EDT -----  Labs are stable and showed no worrisome trends at this time. Continue with chronic medications. Follow up as needed and every 6-months for chronic issues. Yearly for AWV.      Thanks,   Dr. Lizbeth Monaco

## 2022-04-07 ENCOUNTER — OFFICE VISIT (OUTPATIENT)
Dept: FAMILY MEDICINE CLINIC | Age: 87
End: 2022-04-07
Payer: MEDICARE

## 2022-04-07 VITALS
WEIGHT: 126 LBS | RESPIRATION RATE: 20 BRPM | HEIGHT: 64 IN | BODY MASS INDEX: 21.51 KG/M2 | HEART RATE: 66 BPM | SYSTOLIC BLOOD PRESSURE: 122 MMHG | DIASTOLIC BLOOD PRESSURE: 62 MMHG | TEMPERATURE: 97.9 F | OXYGEN SATURATION: 97 %

## 2022-04-07 DIAGNOSIS — F41.1 GENERALIZED ANXIETY DISORDER: ICD-10-CM

## 2022-04-07 PROCEDURE — 99213 OFFICE O/P EST LOW 20 MIN: CPT | Performed by: INTERNAL MEDICINE

## 2022-04-07 PROCEDURE — G8427 DOCREV CUR MEDS BY ELIG CLIN: HCPCS | Performed by: INTERNAL MEDICINE

## 2022-04-07 PROCEDURE — G0463 HOSPITAL OUTPT CLINIC VISIT: HCPCS | Performed by: INTERNAL MEDICINE

## 2022-04-07 PROCEDURE — 1101F PT FALLS ASSESS-DOCD LE1/YR: CPT | Performed by: INTERNAL MEDICINE

## 2022-04-07 PROCEDURE — G8536 NO DOC ELDER MAL SCRN: HCPCS | Performed by: INTERNAL MEDICINE

## 2022-04-07 PROCEDURE — 1090F PRES/ABSN URINE INCON ASSESS: CPT | Performed by: INTERNAL MEDICINE

## 2022-04-07 PROCEDURE — G8510 SCR DEP NEG, NO PLAN REQD: HCPCS | Performed by: INTERNAL MEDICINE

## 2022-04-07 PROCEDURE — G8420 CALC BMI NORM PARAMETERS: HCPCS | Performed by: INTERNAL MEDICINE

## 2022-04-07 RX ORDER — BUSPIRONE HYDROCHLORIDE 5 MG/1
5 TABLET ORAL 2 TIMES DAILY
Qty: 60 TABLET | Refills: 5 | Status: SHIPPED | OUTPATIENT
Start: 2022-04-07 | End: 2022-05-07

## 2022-04-07 NOTE — PROGRESS NOTES
Identified pt with two pt identifiers(name and ). Chief Complaint   Patient presents with    Anxiety     med check        Health Maintenance Due   Topic    Pneumococcal 65+ years (2 of 2 - PPSV23)       Wt Readings from Last 3 Encounters:   22 126 lb (57.2 kg)   22 126 lb (57.2 kg)   21 125 lb (56.7 kg)     Temp Readings from Last 3 Encounters:   22 97.9 °F (36.6 °C) (Temporal)   22 97.5 °F (36.4 °C) (Temporal)   22 97.1 °F (36.2 °C)     BP Readings from Last 3 Encounters:   22 122/62   22 128/72   22 134/70     Pulse Readings from Last 3 Encounters:   22 66   22 62   22 66         Learning Assessment:  :     Learning Assessment 2014   PRIMARY LEARNER Patient   HIGHEST LEVEL OF EDUCATION - PRIMARY LEARNER  DID NOT GRADUATE HIGH SCHOOL   BARRIERS PRIMARY LEARNER NONE   CO-LEARNER CAREGIVER No   PRIMARY LANGUAGE ENGLISH   LEARNER PREFERENCE PRIMARY LISTENING   ANSWERED BY patient    RELATIONSHIP SELF       Depression Screening:  :     3 most recent PHQ Screens 2022   Little interest or pleasure in doing things Not at all   Feeling down, depressed, irritable, or hopeless Not at all   Total Score PHQ 2 0       Fall Risk Assessment:  :     Fall Risk Assessment, last 12 mths 3/14/2022   Able to walk? Yes   Fall in past 12 months? 1   Do you feel unsteady? 1   Are you worried about falling 0   Is the gait abnormal? 0   Number of falls in past 12 months 1   Fall with injury? 0       Abuse Screening:  :     Abuse Screening Questionnaire 2022 3/14/2022 2021 2021 2020 3/12/2019 10/17/2018   Do you ever feel afraid of your partner? N N N N N N N   Are you in a relationship with someone who physically or mentally threatens you? N N N N N N N   Is it safe for you to go home?  Y Y Y Y Y Y Y       Coordination of Care Questionnaire:  :     1) Have you been to an emergency room, urgent care clinic since your last visit? no Hospitalized since your last visit? no             2) Have you seen or consulted any other health care providers outside of [address] [place] [address] since your last visit? no  (Include any pap smears or colon screenings in this section.)    3) Do you have an Advance Directive on file? yes  Are you interested in receiving information about Advance Directives? no    Patient is accompanied by daughter I have received verbal consent from Jordy Jo to discuss any/all medical information while they are present in the room. 4.  For patients aged 39-70: Has the patient had a colonoscopy / FIT/ Cologuard? Yes - no Care Gap present      If the patient is female:    5. For patients aged 41-77: Has the patient had a mammogram within the past 2 years? Yes - no Care Gap present      6. For patients aged 21-65: Has the patient had a pap smear?  Yes - no Care Gap present

## 2022-04-07 NOTE — PROGRESS NOTES
Chief Complaint   Patient presents with    Anxiety     med check       Assessment/ Plan:   Diagnoses and all orders for this visit:    1. Generalized anxiety disorder  -     busPIRone (BUSPAR) 5 mg tablet; Take 1 Tablet by mouth two (2) times a day for 30 days. · Patient Education:  Reviewed concept of anxiety as biochemical imbalance of neurotransmitters and rationale for treatment. Instructed patient to contact office or on-call physician promptly should condition worsen or any new symptoms appear and provided on-call telephone numbers. IF THE PATIENT HAS ANY SUICIDAL OR HOMICIDAL IDEATION, CALL THE OFFICE, DISCUSS WITH A SUPPORT MEMBER OR GO TO THE ER IMMEDIATELY. Patient was agreeable with this plan. I have discussed the diagnosis with the patient and the intended treatment plan as seen in the above orders. The patient has received an after-visit summary and questions were answered concerning future plans. Asked to return should symptoms worsen or not improve with treatment. Any pending labs and studies will be relayed to patient when they become available. Pt verbalizes understanding of plan of care and denies further questions or concerns at this time. Follow-up and Dispositions    · Return in about 3 months (around 7/7/2022), or if symptoms worsen or fail to improve. Subjective:   Bryon Gates is a 80 y.o. female who presents with her daughter for follow up of anxiety and general health. She was started on Buspar for her anxiety and this medication has really helped her. She is less anxious and even notes that she is breathing better. Today, she is going shopping with her daughter and to  her prescriptions glasses. She also reports that she has an appointment to see the orthopedic back doctor for possible injections. She is excited about this. No other worrisome concerns or complaints at this time.      HISTORICAL  PMH, PSH, FHX, SOCHX, ALLERGIES and MES were reviewed and updated today. Review of Systems  Review of Systems   Constitutional: Negative. HENT: Negative. Eyes: Negative. Respiratory: Negative. Cardiovascular: Negative. Gastrointestinal: Negative. Genitourinary: Negative. Musculoskeletal: Negative. Skin: Negative. Neurological: Negative. Endo/Heme/Allergies: Negative. Psychiatric/Behavioral: Negative. All other systems reviewed and are negative. Objective:     Vitals:    04/07/22 1332   BP: 122/62   Pulse: 66   Resp: 20   Temp: 97.9 °F (36.6 °C)   TempSrc: Temporal   SpO2: 97%   Weight: 126 lb (57.2 kg)   Height: 5' 4\" (1.626 m)       Physical Exam  Vitals and nursing note reviewed. HENT:      Head: Normocephalic and atraumatic. Cardiovascular:      Rate and Rhythm: Normal rate and regular rhythm. Pulses: Normal pulses. Heart sounds: Normal heart sounds. Pulmonary:      Effort: Pulmonary effort is normal.      Breath sounds: Normal breath sounds. Musculoskeletal:      Cervical back: Normal range of motion and neck supple. Neurological:      General: No focal deficit present. Mental Status: Mental status is at baseline. Psychiatric:         Mood and Affect: Mood normal.         Behavior: Behavior normal.         Thought Content:  Thought content normal.         Judgment: Judgment normal.         Theo Zaldivar MD  [facility]   02/21/22 10:06 AM

## 2022-04-13 ENCOUNTER — TELEPHONE (OUTPATIENT)
Dept: FAMILY MEDICINE CLINIC | Age: 87
End: 2022-04-13

## 2022-04-13 DIAGNOSIS — M54.16 CHRONIC RADICULAR PAIN OF LOWER BACK: Primary | ICD-10-CM

## 2022-04-13 DIAGNOSIS — M54.41 ACUTE BILATERAL LOW BACK PAIN WITH BILATERAL SCIATICA: ICD-10-CM

## 2022-04-13 DIAGNOSIS — M54.42 ACUTE BILATERAL LOW BACK PAIN WITH BILATERAL SCIATICA: ICD-10-CM

## 2022-04-13 DIAGNOSIS — G89.29 CHRONIC RADICULAR PAIN OF LOWER BACK: Primary | ICD-10-CM

## 2022-04-13 RX ORDER — TRAMADOL HYDROCHLORIDE 50 MG/1
50 TABLET ORAL
Qty: 30 TABLET | Refills: 0 | Status: SHIPPED | OUTPATIENT
Start: 2022-04-13 | End: 2022-05-13

## 2022-04-15 ENCOUNTER — TRANSCRIBE ORDER (OUTPATIENT)
Dept: SCHEDULING | Age: 87
End: 2022-04-15

## 2022-04-15 DIAGNOSIS — M47.816 LUMBAR SPONDYLOSIS: Primary | ICD-10-CM

## 2022-04-15 DIAGNOSIS — M48.062 SPINAL STENOSIS OF LUMBAR REGION WITH NEUROGENIC CLAUDICATION: ICD-10-CM

## 2022-05-02 ENCOUNTER — HOSPITAL ENCOUNTER (OUTPATIENT)
Dept: MRI IMAGING | Age: 87
Discharge: HOME | End: 2022-05-02
Attending: ORTHOPAEDIC SURGERY
Payer: MEDICARE

## 2022-05-02 DIAGNOSIS — M47.816 LUMBAR SPONDYLOSIS: ICD-10-CM

## 2022-05-02 DIAGNOSIS — M48.062 SPINAL STENOSIS OF LUMBAR REGION WITH NEUROGENIC CLAUDICATION: ICD-10-CM

## 2022-05-02 PROCEDURE — 72148 MRI LUMBAR SPINE W/O DYE: CPT

## 2022-05-02 RX ORDER — LEVOTHYROXINE SODIUM 50 UG/1
TABLET ORAL
Qty: 90 TABLET | Refills: 1 | Status: SHIPPED | OUTPATIENT
Start: 2022-05-02 | End: 2022-10-29

## 2022-05-02 RX ORDER — LOSARTAN POTASSIUM 50 MG/1
TABLET ORAL
Qty: 90 TABLET | Refills: 1 | Status: SHIPPED | OUTPATIENT
Start: 2022-05-02 | End: 2022-10-29

## 2022-05-10 ENCOUNTER — TELEPHONE (OUTPATIENT)
Dept: FAMILY MEDICINE CLINIC | Age: 87
End: 2022-05-10

## 2022-05-10 DIAGNOSIS — G89.29 CHRONIC RADICULAR PAIN OF LOWER BACK: Primary | ICD-10-CM

## 2022-05-10 DIAGNOSIS — M54.16 CHRONIC RADICULAR PAIN OF LOWER BACK: Primary | ICD-10-CM

## 2022-05-10 NOTE — TELEPHONE ENCOUNTER
Patient needs referral before she can schedule an appointment.       [facility]  [phone]  Fax [phone]

## 2022-05-26 DIAGNOSIS — E78.00 PURE HYPERCHOLESTEROLEMIA: ICD-10-CM

## 2022-05-26 RX ORDER — ROSUVASTATIN CALCIUM 20 MG/1
TABLET, COATED ORAL
Qty: 90 TABLET | Refills: 1 | Status: SHIPPED | OUTPATIENT
Start: 2022-05-26

## 2022-07-20 ENCOUNTER — OFFICE VISIT (OUTPATIENT)
Dept: ENDOCRINOLOGY | Age: 87
End: 2022-07-20
Payer: MEDICARE

## 2022-07-20 VITALS
BODY MASS INDEX: 23.11 KG/M2 | RESPIRATION RATE: 16 BRPM | OXYGEN SATURATION: 95 % | HEIGHT: 62 IN | DIASTOLIC BLOOD PRESSURE: 70 MMHG | SYSTOLIC BLOOD PRESSURE: 126 MMHG | HEART RATE: 68 BPM | WEIGHT: 125.6 LBS

## 2022-07-20 DIAGNOSIS — M81.0 OSTEOPOROSIS WITHOUT CURRENT PATHOLOGICAL FRACTURE, UNSPECIFIED OSTEOPOROSIS TYPE: Primary | ICD-10-CM

## 2022-07-20 PROCEDURE — G8432 DEP SCR NOT DOC, RNG: HCPCS | Performed by: INTERNAL MEDICINE

## 2022-07-20 PROCEDURE — 99214 OFFICE O/P EST MOD 30 MIN: CPT | Performed by: INTERNAL MEDICINE

## 2022-07-20 PROCEDURE — G8427 DOCREV CUR MEDS BY ELIG CLIN: HCPCS | Performed by: INTERNAL MEDICINE

## 2022-07-20 PROCEDURE — 1090F PRES/ABSN URINE INCON ASSESS: CPT | Performed by: INTERNAL MEDICINE

## 2022-07-20 PROCEDURE — G8420 CALC BMI NORM PARAMETERS: HCPCS | Performed by: INTERNAL MEDICINE

## 2022-07-20 PROCEDURE — G8536 NO DOC ELDER MAL SCRN: HCPCS | Performed by: INTERNAL MEDICINE

## 2022-07-20 PROCEDURE — 1123F ACP DISCUSS/DSCN MKR DOCD: CPT | Performed by: INTERNAL MEDICINE

## 2022-07-20 PROCEDURE — 1101F PT FALLS ASSESS-DOCD LE1/YR: CPT | Performed by: INTERNAL MEDICINE

## 2022-07-20 RX ORDER — BUSPIRONE HYDROCHLORIDE 5 MG/1
TABLET ORAL
COMMUNITY
End: 2022-10-02 | Stop reason: SDUPTHER

## 2022-07-20 RX ORDER — GLUCOSAMINE/CHONDR SU A SOD 750-600 MG
TABLET ORAL
COMMUNITY

## 2022-07-20 RX ORDER — CALCIUM CARBONATE 500(1250)
1250 TABLET ORAL DAILY
COMMUNITY

## 2022-07-20 NOTE — PROGRESS NOTES
Chief Complaint   Patient presents with    Osteoporosis     History of Present Illness: Emma Solitario is a 80 y.o. female with a past medical hx significant for IFG seen in referral from Juliana Parsons MD for discussion related to osteoporosis management. Initial visit:  Had rotator cuff injury following a fall, had 2 black eyes. Menopause was at least 62 years ago, 6 kids. Never smoker, took fosamax for 5 years. Has dentures, full set. Eats 2 meals per day- cereal both meals with 2% milk.         07/20/2022: Received prolia in Aug and Feb- next dose Aug 29. Last DEXA 04/2021. Is bored at home, living with son. Unable to have garden because he brings a lot of junk in to the yard. Does have some memory difficulties and hearing difficulties. Did not have any trouble with the Prolia. Current Outpatient Medications   Medication Sig    calcium carbonate (OS-RENÉ) 500 mg calcium (1,250 mg) tablet Take 1,250 mg by mouth in the morning. busPIRone (BUSPAR) 5 mg tablet buspirone 5 mg tablet   TAKE 1 TABLET BY MOUTH TWO (2) TIMES A DAY FOR 30 DAYS.    lutein-zeaxanthin 25-5 mg cap Take  by mouth. rosuvastatin (CRESTOR) 20 mg tablet TAKE 1 TABLET BY MOUTH EVERY DAY    losartan (COZAAR) 50 mg tablet TAKE 1 TABLET BY MOUTH EVERY DAY    levothyroxine (SYNTHROID) 50 mcg tablet TAKE 1 TABLET BY MOUTH EVERY DAY BEFORE BREAKFAST    omeprazole (PRILOSEC) 20 mg capsule TAKE 2 CAPSULES BY MOUTH EVERY DAY    calcium carbonate (OS-RENÉ) 500 mg calcium (1,250 mg) tablet Take 1,250 mg by mouth daily. aspirin delayed-release 81 mg tablet take 81 mg by mouth daily. albuterol sulfate (ProAir RespiClick) 90 mcg/actuation breath activated inhaler Take 1 Puff by inhalation every four (4) hours as needed for Wheezing or Cough.  (R05); (R06.2) (Patient not taking: Reported on 7/20/2022)    ILEVRO 0.3 % drps STARTING DAY BEFORE SURGERY, INSTILL 1 DROP IN RIGHT EYE DAILY (Patient not taking: No sig reported) cholecalciferol (VITAMIN D3) (1000 Units /25 mcg) tablet Take 1,000 Units by mouth daily. (Patient not taking: Reported on 7/20/2022)     No current facility-administered medications for this visit. Social Hx: lives with son- 62 in [place]  Non smoker    Review of Systems:see HPI    Physical Examination:  Visit Vitals  /70   Pulse 68   Resp 16   Ht 5' 2\" (1.575 m)   Wt 125 lb 9.6 oz (57 kg)   SpO2 95%   BMI 22.97 kg/m²     - GENERAL: NCAT, Appears well nourished   - EYES: EOMI, non-icteric, no proptosis   - Ear/Nose/Throat: NCAT, no visible inflammation or masses   - CARDIOVASCULAR: no cyanosis, no visible JVD   - RESPIRATORY: respiratory effort normal without any distress or labored breathing   - MUSCULOSKELETAL: Normal ROM of neck and upper extremities observed   - SKIN: No rash on face  - NEUROLOGIC:  No facial asymmetry (Cranial nerve 7 motor function), No gaze palsy   - PSYCHIATRIC: Normal affect, Normal insight and judgement     Data Reviewed:         Assessment/Plan: This is a very pleasant 79 yo female with a past medical hx significant for IFG seen in referral from Heriberto Najjar, MD for discussion related to management of osteoporosis. Has previously taken bisphosphonate for 5 years with maintenance of bone mineral density. Will switch to anabolic agent- reviewed risk of ONJ, hypOcalcemia which would manifest as perioral numbness/ tingling in fingertips. Continue twice daily calcium at separate times, look for calcium fortified cereals. Will need to be given every 6 months indefinitely. Has received 2 doses of Prolia as of July 2022. Reviewed with patient and her oldest daughter today the fact that this medication cannot be discontinued or the risk of fracture increases dramatically.   We will plan on reassessing DEXA in April 2023, 2 years after the last.    #osteoporosis  -s/p 5 years of bisphosphonate  -Received to the first and second doses of Prolia, next is due August 29, 2022  -Repeat DEXA in April 2023  -discussed risk of hypOcalcemia with this med, presents as perioral numbness and numbness in fingertips  -would be very surprised if this occurred given normocalcemia and normal renal fx currently  -continue 2x daily calcium supplementation at different times, look for calcium fortified cereals  Calcium Content of Selected Foods    Dairy and Soy Amount Calcium (mg)   Milk (skim, low fat, whole) 1 cup 300   Buttermilk 1 cup 300   Cottage Cheese . 5 cup 65   Ice Cream or Ice Milk . 5 cup 100   Sour Cream, cultured 1 cup 250   Soy Milk, calcium fortified 1 cup 200 to 400   Yogurt 1 cup 450   Yogurt drink 12 oz 300   [place] Instant Breakfast 1 packet 250   Hot [place], calcium fortified 1 packet 320   Nonfat dry milk powder 5 Tbsp 300   Brie Cheese 1 oz 50   Hard Cheese (cheddar, lyle) 1 oz 200   Mozzarella 1 oz 200   Parmesan Cheese 1 Tbsp 70   Swiss or Gruyere 1 oz 270     Vegetables   R[place]el[place] squash, cooked 1 cup 90   Arugula, raw 1 cup 125   Bok [place], raw 1 cup 40   Broccoli, cooked 1 cup 180   Chard or Okra, cooked 1 cup 100   Chicory (curly endive), raw 1 cup 40   Sam greens 1 cup 50   Corn, brine packed 1 cup 10   Dandelion greens, raw 1 cup 80   Kale, raw 1 cup 55   Kelp or Kombe 1 cup 60   Mustard greens 1 cup 40   Spinach, cooked 1 cup 240   Turnip greens, raw 1 cup 80     Fruits   Figs, dried, uncooked 1 cup 300   Kiwi, raw 1 cup 50   Orange juice, calcium fortified 8 oz 300   Orange juice, from concentrate 1 cup 20     Legumes   Garbanzo Beans, cooked 1 cup 80   Legumes, general, cooked . 5 cup 15 to 50   Moreno Beans, cooked 1 cup 75   Soybeans, boiled . 5 cup 100   Temphe . 5 cup 75   Tofu, firm, calcium set 4 oz 250 to 750   Tofu, soft regular 4 oz 120 to 390   White Beans, cooked . 5 cup 70     Grains   Cereals (calcium fortified) . 5 to 1 cup 250 to 1000   Amaranth, cooked . 5 cup 135   Bread, calcium fortified 1 slice 509 to 014   Brown rice, long grain, raw 1 cup 50   Oatmeal, instant 1 package 100 to 150   Tortillas, corn 2 85     Nuts and Seeds   Almonds, toasted unblanched 1 oz 80   Sesame seeds, whole roasted 1 oz 280   Sesame tahini 1 oz (2 Tbsp) 130   Sunflower seeds, dried 1 oz 50     Fish   Mackerel, canned 3 oz 250   [place], canned, with bones 3 oz 170 to 210   Sardines 3 oz 370     Other   Molasses, blackstrap 1 Tbsp 135   * When range is given, calcium content varies by product. * The calcium content of plant foods is varied. Most vegetables, legumes, nuts, seeds, and dried fruit contain some calcium. Listed are selected significant sources of well-absorbed calcium. References:   [employer], Handbook 8 palm program   Mina and Yazidi     How Much Do You Need? Age Calcium (mg)   3- 1year old 500 mg   3- 6year old 800 mg   5- 25year old 1300 mg   23- 48year old 1000 mg   46- 79year old 1200 mg   > 79year old 1200 mg     [email or web address]    Copy sent to:Yannick Pickering MD    Return to care in May 2023    Allan Davis [facility]     [phone] ** Keyona Simmons best number to call to schedule prolia.

## 2022-08-04 ENCOUNTER — TELEPHONE (OUTPATIENT)
Dept: ENDOCRINOLOGY | Age: 87
End: 2022-08-04

## 2022-08-04 NOTE — TELEPHONE ENCOUNTER
8/4/2022  1:17 PM    Pt called and left message at 11.53 today to ask if she is supposed to get a bone density scan done before her shot this month.  She stated that her insurance will only cover for 2 years, she requested a call back at [phone] thanks

## 2022-08-05 NOTE — TELEPHONE ENCOUNTER
I attempted to return this call ad reached a voice mail.  I left a message letting her know that [facility] said it was not due till 04/2023  Manny Atwood

## 2022-08-07 RX ORDER — OMEPRAZOLE 20 MG/1
CAPSULE, DELAYED RELEASE ORAL
Qty: 180 CAPSULE | Refills: 1 | Status: SHIPPED | OUTPATIENT
Start: 2022-08-07

## 2022-08-18 ENCOUNTER — TELEPHONE (OUTPATIENT)
Dept: FAMILY MEDICINE CLINIC | Age: 87
End: 2022-08-18

## 2022-08-18 DIAGNOSIS — H91.90 DECREASED HEARING, UNSPECIFIED LATERALITY: Primary | ICD-10-CM

## 2022-08-18 NOTE — TELEPHONE ENCOUNTER
Pt needs a referral to audiology for annual hearing test - Dr Adeel Hilliard with [facility]  Fax # [phone]    Questions call Perry Muse at [phone]

## 2022-08-22 DIAGNOSIS — M81.0 AGE-RELATED OSTEOPOROSIS WITHOUT CURRENT PATHOLOGICAL FRACTURE: Primary | ICD-10-CM

## 2022-08-26 ENCOUNTER — APPOINTMENT (OUTPATIENT)
Dept: INFUSION THERAPY | Age: 87
End: 2022-08-26

## 2022-08-29 ENCOUNTER — HOSPITAL ENCOUNTER (OUTPATIENT)
Dept: INFUSION THERAPY | Age: 87
Discharge: HOME | End: 2022-08-29

## 2022-09-07 ENCOUNTER — HOSPITAL ENCOUNTER (OUTPATIENT)
Dept: INFUSION THERAPY | Age: 87
Discharge: HOME | End: 2022-09-07
Payer: MEDICARE

## 2022-09-07 VITALS
RESPIRATION RATE: 18 BRPM | HEART RATE: 64 BPM | DIASTOLIC BLOOD PRESSURE: 68 MMHG | TEMPERATURE: 97.4 F | SYSTOLIC BLOOD PRESSURE: 118 MMHG

## 2022-09-07 DIAGNOSIS — M81.0 AGE-RELATED OSTEOPOROSIS WITHOUT CURRENT PATHOLOGICAL FRACTURE: Primary | ICD-10-CM

## 2022-09-07 LAB
ALBUMIN SERPL-MCNC: 3.3 G/DL (ref 3.5–5)
ALBUMIN/GLOB SERPL: 1.2 {RATIO} (ref 1.1–2.2)
ALP SERPL-CCNC: 46 U/L (ref 45–117)
ALT SERPL-CCNC: 18 U/L (ref 12–78)
ANION GAP SERPL CALC-SCNC: 4 MMOL/L (ref 5–15)
AST SERPL-CCNC: 28 U/L (ref 15–37)
BILIRUB SERPL-MCNC: 0.3 MG/DL (ref 0.2–1)
BUN SERPL-MCNC: 14 MG/DL (ref 6–20)
BUN/CREAT SERPL: 14 (ref 12–20)
CALCIUM SERPL-MCNC: 8.7 MG/DL (ref 8.5–10.1)
CHLORIDE SERPL-SCNC: 106 MMOL/L (ref 97–108)
CO2 SERPL-SCNC: 30 MMOL/L (ref 21–32)
CREAT SERPL-MCNC: 0.98 MG/DL (ref 0.55–1.02)
GLOBULIN SER CALC-MCNC: 2.7 G/DL (ref 2–4)
GLUCOSE SERPL-MCNC: 95 MG/DL (ref 65–100)
POTASSIUM SERPL-SCNC: 4.6 MMOL/L (ref 3.5–5.1)
PROT SERPL-MCNC: 6 G/DL (ref 6.4–8.2)
SODIUM SERPL-SCNC: 140 MMOL/L (ref 136–145)

## 2022-09-07 PROCEDURE — 80053 COMPREHEN METABOLIC PANEL: CPT

## 2022-09-07 PROCEDURE — 96372 THER/PROPH/DIAG INJ SC/IM: CPT

## 2022-09-07 PROCEDURE — 36415 COLL VENOUS BLD VENIPUNCTURE: CPT

## 2022-09-07 PROCEDURE — 74011250636 HC RX REV CODE- 250/636: Performed by: INTERNAL MEDICINE

## 2022-09-07 RX ADMIN — DENOSUMAB 60 MG: 60 INJECTION SUBCUTANEOUS at 17:04

## 2022-09-07 NOTE — PROGRESS NOTES
Outpatient Infusion Center Progress Note    0248 Pt admit to [facility] for Prolia ambulatory in stable condition. Assessment completed. No new concerns voiced. Labs drawn per order and sent. Visit Vitals  /68   Pulse 64   Temp 97.4 °F (36.3 °C)   Resp 18   Breastfeeding No       Medications:  Medications Administered       denosumab (PROLIA) injection 60 mg       Admin Date  09/07/2022 Action  Given Dose  60 mg Route  SubCUTAneous Administered By  Ap Joel, RN                  6634 Pt tolerated treatment well. D/c home ambulatory in no distress.  Pt aware of next appointment scheduled for   Future Appointments   Date Time Provider Patrice Moore   3/7/2023  3:00 PM [facility]

## 2022-10-02 DIAGNOSIS — F41.1 GENERALIZED ANXIETY DISORDER: ICD-10-CM

## 2022-10-02 RX ORDER — BUSPIRONE HYDROCHLORIDE 5 MG/1
TABLET ORAL
Qty: 60 TABLET | Refills: 5 | Status: SHIPPED | OUTPATIENT
Start: 2022-10-02

## 2022-10-29 RX ORDER — LOSARTAN POTASSIUM 50 MG/1
TABLET ORAL
Qty: 90 TABLET | Refills: 1 | Status: SHIPPED | OUTPATIENT
Start: 2022-10-29

## 2022-10-29 RX ORDER — LEVOTHYROXINE SODIUM 50 UG/1
TABLET ORAL
Qty: 90 TABLET | Refills: 1 | Status: SHIPPED | OUTPATIENT
Start: 2022-10-29

## 2022-11-23 DIAGNOSIS — E78.00 PURE HYPERCHOLESTEROLEMIA: ICD-10-CM

## 2022-11-23 RX ORDER — ROSUVASTATIN CALCIUM 20 MG/1
TABLET, COATED ORAL
Qty: 90 TABLET | Refills: 1 | Status: SHIPPED | OUTPATIENT
Start: 2022-11-23

## 2023-01-17 NOTE — TELEPHONE ENCOUNTER
Pts daughter called because pt said she isn't breathing right.  In the morning she's fine but as the day goes on it gets worse    [phone]

## 2023-01-31 ENCOUNTER — LAB ONLY (OUTPATIENT)
Dept: FAMILY MEDICINE CLINIC | Age: 88
End: 2023-01-31
Payer: MEDICARE

## 2023-01-31 ENCOUNTER — OFFICE VISIT (OUTPATIENT)
Dept: FAMILY MEDICINE CLINIC | Age: 88
End: 2023-01-31
Payer: MEDICARE

## 2023-01-31 VITALS
RESPIRATION RATE: 20 BRPM | SYSTOLIC BLOOD PRESSURE: 138 MMHG | WEIGHT: 121.2 LBS | DIASTOLIC BLOOD PRESSURE: 80 MMHG | HEIGHT: 62 IN | BODY MASS INDEX: 22.31 KG/M2 | HEART RATE: 52 BPM | TEMPERATURE: 98.2 F

## 2023-01-31 DIAGNOSIS — E55.9 VITAMIN D DEFICIENCY: ICD-10-CM

## 2023-01-31 DIAGNOSIS — M54.50 CHRONIC BILATERAL LOW BACK PAIN WITHOUT SCIATICA: ICD-10-CM

## 2023-01-31 DIAGNOSIS — E02 SUBCLINICAL IODINE-DEFICIENCY HYPOTHYROIDISM: ICD-10-CM

## 2023-01-31 DIAGNOSIS — G89.29 CHRONIC BILATERAL LOW BACK PAIN WITHOUT SCIATICA: ICD-10-CM

## 2023-01-31 DIAGNOSIS — E02 SUBCLINICAL IODINE-DEFICIENCY HYPOTHYROIDISM: Primary | ICD-10-CM

## 2023-01-31 DIAGNOSIS — Z23 ENCOUNTER FOR IMMUNIZATION: ICD-10-CM

## 2023-01-31 DIAGNOSIS — E78.00 PURE HYPERCHOLESTEROLEMIA: ICD-10-CM

## 2023-01-31 DIAGNOSIS — F41.1 GENERALIZED ANXIETY DISORDER: ICD-10-CM

## 2023-01-31 PROCEDURE — 99213 OFFICE O/P EST LOW 20 MIN: CPT | Performed by: INTERNAL MEDICINE

## 2023-01-31 PROCEDURE — G8427 DOCREV CUR MEDS BY ELIG CLIN: HCPCS | Performed by: INTERNAL MEDICINE

## 2023-01-31 PROCEDURE — G8510 SCR DEP NEG, NO PLAN REQD: HCPCS | Performed by: INTERNAL MEDICINE

## 2023-01-31 PROCEDURE — 90732 PPSV23 VACC 2 YRS+ SUBQ/IM: CPT | Performed by: INTERNAL MEDICINE

## 2023-01-31 PROCEDURE — G8536 NO DOC ELDER MAL SCRN: HCPCS | Performed by: INTERNAL MEDICINE

## 2023-01-31 PROCEDURE — G8420 CALC BMI NORM PARAMETERS: HCPCS | Performed by: INTERNAL MEDICINE

## 2023-01-31 PROCEDURE — G0463 HOSPITAL OUTPT CLINIC VISIT: HCPCS | Performed by: INTERNAL MEDICINE

## 2023-01-31 PROCEDURE — 1101F PT FALLS ASSESS-DOCD LE1/YR: CPT | Performed by: INTERNAL MEDICINE

## 2023-01-31 PROCEDURE — 1123F ACP DISCUSS/DSCN MKR DOCD: CPT | Performed by: INTERNAL MEDICINE

## 2023-01-31 PROCEDURE — 1090F PRES/ABSN URINE INCON ASSESS: CPT | Performed by: INTERNAL MEDICINE

## 2023-01-31 RX ORDER — TRAMADOL HYDROCHLORIDE 50 MG/1
TABLET ORAL
COMMUNITY
End: 2023-01-31 | Stop reason: SDUPTHER

## 2023-01-31 RX ORDER — TRAMADOL HYDROCHLORIDE 50 MG/1
50 TABLET ORAL
Qty: 30 TABLET | Refills: 0 | Status: SHIPPED | OUTPATIENT
Start: 2023-01-31 | End: 2023-03-02

## 2023-01-31 RX ORDER — AMOXICILLIN 250 MG
CAPSULE ORAL
COMMUNITY
End: 2023-01-31 | Stop reason: SINTOL

## 2023-01-31 NOTE — PROGRESS NOTES
After obtaining consent, and per orders of Dr. Mercy Still, injection of Pneumovax-23 given by [facility]. Patient instructed to remain in clinic for 20 minutes afterwards, and to report any adverse reaction to me immediately.      [id number]/ LOT #  [id number]/ Se 97 /

## 2023-01-31 NOTE — PROGRESS NOTES
Chief Complaint   Patient presents with    Hypertension     Follow up    Thyroid Problem     Follow up     Assessment/ Plan:   Diagnoses and all orders for this visit:    1. Subclinical iodine-deficiency hypothyroidism  -     TSH 3RD GENERATION; Future  -     T4 (THYROXINE); Future    2. Generalized anxiety disorder   Stable. Well controlled. 3. Vitamin D deficiency  -     VITAMIN D, 25 HYDROXY; Future    4. Pure hypercholesterolemia  -     METABOLIC PANEL, COMPREHENSIVE; Future  -     CBC WITH AUTOMATED DIFF; Future    5. Encounter for immunization  -     PNEUMOCOCCAL, PPSV23, (AGE 2 YRS+), SC/IM  -     ADMIN PNEUMOCOCCAL VACCINE    6. Chronic bilateral low back pain without sciatica  -     traMADoL (ULTRAM) 50 mg tablet; Take 1 Tablet by mouth daily as needed for Pain for up to 30 days. Patient uses this sparingly. Last refill was more than 3-months ago. Occasionally, needs one for night time back pain. I have discussed the diagnosis with the patient and the intended treatment plan as seen in the above orders. The patient has received an after-visit summary and questions were answered concerning future plans. Asked to return should symptoms worsen or not improve with treatment. Any pending labs and studies will be relayed to patient when they become available. Pt verbalizes understanding of plan of care and denies further questions or concerns at this time. Follow-up and Dispositions    Return if symptoms worsen or fail to improve. Subjective:   Maggie Rios is a 80 y.o. female who presents for 6-month follow up. She has been doing well. She had been diagnosed with macular degeneration and so seeing a specialist for this. In addition, she had received some SIMIN in her back 2 x now. However, she is not sure she wants to do this again. She is using the Tramadol very sparingly. Only when she has significant pain. Needs a refill at ths time. Needs some scrrening labs.      Needs PCV-23 #2 and then will have completed this series. Otherwise, doing well and no major concerns or issues. HISTORICAL  PMH, PSH, FHX, SOCHX, ALLERGIES and MES were reviewed and updated today. Review of Systems  Review of Systems   Constitutional: Negative. HENT: Negative. Eyes: Negative. Respiratory: Negative. Cardiovascular: Negative. Gastrointestinal: Negative. Genitourinary: Negative. Musculoskeletal:  Positive for back pain and myalgias. Skin: Negative. Neurological: Negative. Endo/Heme/Allergies: Negative. Psychiatric/Behavioral: Negative. All other systems reviewed and are negative. Objective:     Visit Vitals  /80 (BP 1 Location: Right upper arm, BP Patient Position: Sitting)   Pulse (!) 52   Temp 98.2 °F (36.8 °C) (Oral)   Resp 20   Ht 5' 2\" (1.575 m)   Wt 121 lb 3.2 oz (55 kg)   BMI 22.17 kg/m²     Physical Exam  Constitutional:       Appearance: Normal appearance. Cardiovascular:      Rate and Rhythm: Normal rate and regular rhythm. Pulses: Normal pulses. Heart sounds: Normal heart sounds. Pulmonary:      Effort: Pulmonary effort is normal.      Breath sounds: Normal breath sounds. Musculoskeletal:      Cervical back: Normal range of motion and neck supple. Skin:     General: Skin is warm. Neurological:      General: No focal deficit present. Mental Status: She is alert and oriented to person, place, and time. Mental status is at baseline. Psychiatric:         Mood and Affect: Mood normal.         Behavior: Behavior normal.         Thought Content:  Thought content normal.         Judgment: Judgment normal.     Lindsey Jarrell MD  [address] [place] [address] Dinos Rule   01/31/23

## 2023-01-31 NOTE — PROGRESS NOTES
Identified pt with two pt identifiers(name and ). Reviewed record in preparation for visit and have obtained necessary documentation. All patient medications has been reviewed. Chief Complaint   Patient presents with    Hypertension     Follow up    Thyroid Problem     Follow up       3 most recent PHQ Screens 2023   Little interest or pleasure in doing things Not at all   Feeling down, depressed, irritable, or hopeless Not at all   Total Score PHQ 2 0     Abuse Screening Questionnaire 2022   Do you ever feel afraid of your partner? N   Are you in a relationship with someone who physically or mentally threatens you? N   Is it safe for you to go home? Y       Health Maintenance Due   Topic    Shingles Vaccine (1 of 2)    Pneumococcal 65+ years (2 - PPSV23 if available, else PCV20)    COVID-19 Vaccine (4 - Booster for Moderna series)     Health Maintenance Review: Patient reminded of \"due or due soon\" health maintenance. I have asked the patient to contact his/her primary care provider (PCP) for follow-up on his/her health maintenance. Vitals:    23 1338   BP: (!) 140/80   Pulse: (!) 52   Resp: 20   Temp: 98.2 °F (36.8 °C)   TempSrc: Oral   Weight: 121 lb 3.2 oz (55 kg)   Height: 5' 2\" (1.575 m)   PainSc:   0 - No pain       Wt Readings from Last 3 Encounters:   23 121 lb 3.2 oz (55 kg)   22 125 lb 9.6 oz (57 kg)   22 126 lb (57.2 kg)     Temp Readings from Last 3 Encounters:   23 98.2 °F (36.8 °C) (Oral)   22 97.4 °F (36.3 °C)   22 97.9 °F (36.6 °C) (Temporal)     BP Readings from Last 3 Encounters:   23 (!) 140/80   22 118/68   22 126/70     Pulse Readings from Last 3 Encounters:   23 (!) 52   22 64   22 68       1. \"Have you been to the ER, urgent care clinic since your last visit? Hospitalized since your last visit? \" No    2.  \"Have you seen or consulted any other health care providers outside of the [facility] since your last visit? \" Yes When: 1/5/2022 Where: [facility] Reason for visit: Arthritis and spine injections. 3. For patients aged 39-70: Has the patient had a colonoscopy / FIT/ Cologuard? NA - based on age      If the patient is female:    4. For patients aged 41-77: Has the patient had a mammogram within the past 2 years? NA - based on age or sex      11. For patients aged 21-65: Has the patient had a pap smear? NA - based on age or sex       Patient is accompanied by daughters I have received verbal consent from Vega Alanis to discuss any/all medical information while they are present in the room.

## 2023-02-01 LAB
25(OH)D3 SERPL-MCNC: 53.5 NG/ML (ref 30–100)
ALBUMIN SERPL-MCNC: 3.8 G/DL (ref 3.5–5)
ALBUMIN/GLOB SERPL: 1.5 (ref 1.1–2.2)
ALP SERPL-CCNC: 51 U/L (ref 45–117)
ALT SERPL-CCNC: 20 U/L (ref 12–78)
ANION GAP SERPL CALC-SCNC: 5 MMOL/L (ref 5–15)
AST SERPL-CCNC: 25 U/L (ref 15–37)
BASOPHILS # BLD: 0.1 K/UL (ref 0–0.1)
BASOPHILS NFR BLD: 1 % (ref 0–1)
BILIRUB SERPL-MCNC: 0.4 MG/DL (ref 0.2–1)
BUN SERPL-MCNC: 13 MG/DL (ref 6–20)
BUN/CREAT SERPL: 13 (ref 12–20)
CALCIUM SERPL-MCNC: 8.9 MG/DL (ref 8.5–10.1)
CHLORIDE SERPL-SCNC: 110 MMOL/L (ref 97–108)
CO2 SERPL-SCNC: 28 MMOL/L (ref 21–32)
CREAT SERPL-MCNC: 0.98 MG/DL (ref 0.55–1.02)
DIFFERENTIAL METHOD BLD: ABNORMAL
EOSINOPHIL # BLD: 0.2 K/UL (ref 0–0.4)
EOSINOPHIL NFR BLD: 2 % (ref 0–7)
ERYTHROCYTE [DISTWIDTH] IN BLOOD BY AUTOMATED COUNT: 13.2 % (ref 11.5–14.5)
GLOBULIN SER CALC-MCNC: 2.5 G/DL (ref 2–4)
GLUCOSE SERPL-MCNC: 85 MG/DL (ref 65–100)
HCT VFR BLD AUTO: 47.3 % (ref 35–47)
HGB BLD-MCNC: 14.8 G/DL (ref 11.5–16)
IMM GRANULOCYTES # BLD AUTO: 0 K/UL (ref 0–0.04)
IMM GRANULOCYTES NFR BLD AUTO: 0 % (ref 0–0.5)
LYMPHOCYTES # BLD: 2.6 K/UL (ref 0.8–3.5)
LYMPHOCYTES NFR BLD: 33 % (ref 12–49)
MCH RBC QN AUTO: 30.1 PG (ref 26–34)
MCHC RBC AUTO-ENTMCNC: 31.3 G/DL (ref 30–36.5)
MCV RBC AUTO: 96.1 FL (ref 80–99)
MONOCYTES # BLD: 0.5 K/UL (ref 0–1)
MONOCYTES NFR BLD: 7 % (ref 5–13)
NEUTS SEG # BLD: 4.5 K/UL (ref 1.8–8)
NEUTS SEG NFR BLD: 57 % (ref 32–75)
NRBC # BLD: 0 K/UL (ref 0–0.01)
NRBC BLD-RTO: 0 PER 100 WBC
PLATELET # BLD AUTO: 214 K/UL (ref 150–400)
PMV BLD AUTO: 10.4 FL (ref 8.9–12.9)
POTASSIUM SERPL-SCNC: 4.6 MMOL/L (ref 3.5–5.1)
PROT SERPL-MCNC: 6.3 G/DL (ref 6.4–8.2)
RBC # BLD AUTO: 4.92 M/UL (ref 3.8–5.2)
SODIUM SERPL-SCNC: 143 MMOL/L (ref 136–145)
T4 SERPL-MCNC: 13.3 UG/DL (ref 4.8–13.9)
TSH SERPL DL<=0.05 MIU/L-ACNC: 0.45 UIU/ML (ref 0.36–3.74)
WBC # BLD AUTO: 7.8 K/UL (ref 3.6–11)

## 2023-02-03 RX ORDER — OMEPRAZOLE 20 MG/1
CAPSULE, DELAYED RELEASE ORAL
Qty: 180 CAPSULE | Refills: 1 | Status: SHIPPED | OUTPATIENT
Start: 2023-02-03

## 2023-02-03 NOTE — TELEPHONE ENCOUNTER
Spoke to daughter about labs and she understood.  Would like a refill on inhaler when you return on Monday

## 2023-02-03 NOTE — PROGRESS NOTES
Please let patient know that her labs were stable/normal and showed no major concerns or issues. She should follow up in 6-12 months and as needed. Thanks!

## 2023-02-03 NOTE — TELEPHONE ENCOUNTER
----- Message from Abril Alvarado MD sent at 2/3/2023 10:14 AM EST -----  Please let patient know that her labs were stable/normal and showed no major concerns or issues. She should follow up in 6-12 months and as needed. Thanks!

## 2023-03-07 ENCOUNTER — HOSPITAL ENCOUNTER (OUTPATIENT)
Dept: INFUSION THERAPY | Age: 88
Discharge: HOME | End: 2023-03-07
Payer: MEDICARE

## 2023-03-07 VITALS — DIASTOLIC BLOOD PRESSURE: 76 MMHG | TEMPERATURE: 97.7 F | RESPIRATION RATE: 18 BRPM | SYSTOLIC BLOOD PRESSURE: 120 MMHG

## 2023-03-07 DIAGNOSIS — M81.0 AGE-RELATED OSTEOPOROSIS WITHOUT CURRENT PATHOLOGICAL FRACTURE: Primary | ICD-10-CM

## 2023-03-07 LAB
ALBUMIN SERPL-MCNC: 3.3 G/DL (ref 3.5–5)
ALBUMIN/GLOB SERPL: 1.1 (ref 1.1–2.2)
ALP SERPL-CCNC: 47 U/L (ref 45–117)
ALT SERPL-CCNC: 20 U/L (ref 12–78)
ANION GAP BLD CALC-SCNC: 12 MMOL/L (ref 10–20)
ANION GAP SERPL CALC-SCNC: 4 MMOL/L (ref 5–15)
AST SERPL-CCNC: 23 U/L (ref 15–37)
BILIRUB SERPL-MCNC: 0.3 MG/DL (ref 0.2–1)
BUN SERPL-MCNC: 12 MG/DL (ref 6–20)
BUN/CREAT SERPL: 11 (ref 12–20)
CA-I BLD-MCNC: 1.1 MMOL/L (ref 1.12–1.32)
CALCIUM SERPL-MCNC: 8.7 MG/DL (ref 8.5–10.1)
CHLORIDE BLD-SCNC: 102 MMOL/L (ref 98–107)
CHLORIDE SERPL-SCNC: 106 MMOL/L (ref 97–108)
CO2 BLD-SCNC: 26.6 MMOL/L (ref 21–32)
CO2 SERPL-SCNC: 30 MMOL/L (ref 21–32)
CREAT BLD-MCNC: 0.99 MG/DL (ref 0.6–1.3)
CREAT SERPL-MCNC: 1.05 MG/DL (ref 0.55–1.02)
GLOBULIN SER CALC-MCNC: 3.1 G/DL (ref 2–4)
GLUCOSE BLD-MCNC: 107 MG/DL (ref 65–100)
GLUCOSE SERPL-MCNC: 106 MG/DL (ref 65–100)
POTASSIUM BLD-SCNC: 4.2 MMOL/L (ref 3.5–5.1)
POTASSIUM SERPL-SCNC: 4.2 MMOL/L (ref 3.5–5.1)
PROT SERPL-MCNC: 6.4 G/DL (ref 6.4–8.2)
SERVICE CMNT-IMP: ABNORMAL
SODIUM BLD-SCNC: 140 MMOL/L (ref 136–145)
SODIUM SERPL-SCNC: 140 MMOL/L (ref 136–145)

## 2023-03-07 PROCEDURE — 96372 THER/PROPH/DIAG INJ SC/IM: CPT

## 2023-03-07 PROCEDURE — 74011250636 HC RX REV CODE- 250/636: Performed by: INTERNAL MEDICINE

## 2023-03-07 PROCEDURE — 80053 COMPREHEN METABOLIC PANEL: CPT

## 2023-03-07 PROCEDURE — 36415 COLL VENOUS BLD VENIPUNCTURE: CPT

## 2023-03-07 PROCEDURE — 80047 BASIC METABLC PNL IONIZED CA: CPT

## 2023-03-07 RX ADMIN — DENOSUMAB 60 MG: 60 INJECTION SUBCUTANEOUS at 16:45

## 2023-03-07 NOTE — PROGRESS NOTES
[facility] Short Note                       Date: 2023    Name: Thi Ward    MRN: [id number]         : 1927    1500 Pt admit to [facility] for Prolia ambulatory in stable condition. Assessment completed. No new concerns voiced. Patient Vitals for the past 12 hrs:   Temp Resp BP   23 1518 97.7 °F (36.5 °C) 18 120/76             Medications given: Left ARM  Medications Administered       denosumab (PROLIA) injection 60 mg       Admin Date  2023 Action  Given Dose  60 mg Route  SubCUTAneous Administered By  Maria Aguilera, ELIZA                       1109 Pt tolerated treatment well. D/c home ambulatory in no distress. Pt aware of next appointment scheduled for .     Future Appointments   Date Time Provider Patrice Moore   2023  7:30 AM [facility] [address] [place] [address] ELIZA Ley  2023  4:50 PM

## 2023-04-03 PROBLEM — E11.21 TYPE 2 DIABETES MELLITUS WITH NEPHROPATHY (HCC): Status: RESOLVED | Noted: 2018-02-07 | Resolved: 2021-02-01

## 2023-04-23 DIAGNOSIS — M81.0 OSTEOPOROSIS WITHOUT CURRENT PATHOLOGICAL FRACTURE, UNSPECIFIED OSTEOPOROSIS TYPE: Primary | ICD-10-CM

## 2023-04-27 RX ORDER — LOSARTAN POTASSIUM 50 MG/1
TABLET ORAL
Qty: 90 TABLET | Refills: 1 | Status: SHIPPED | OUTPATIENT
Start: 2023-04-27

## 2023-04-27 RX ORDER — LEVOTHYROXINE SODIUM 50 UG/1
TABLET ORAL
Qty: 90 TABLET | Refills: 1 | Status: SHIPPED | OUTPATIENT
Start: 2023-04-27

## 2023-05-23 ENCOUNTER — HOSPITAL ENCOUNTER (OUTPATIENT)
Facility: HOSPITAL | Age: 88
Discharge: HOME | End: 2023-05-26
Payer: MEDICARE

## 2023-05-23 DIAGNOSIS — M81.0 OSTEOPOROSIS WITHOUT CURRENT PATHOLOGICAL FRACTURE, UNSPECIFIED OSTEOPOROSIS TYPE: ICD-10-CM

## 2023-05-23 PROCEDURE — 77080 DXA BONE DENSITY AXIAL: CPT

## 2023-05-27 DIAGNOSIS — E78.00 PURE HYPERCHOLESTEROLEMIA, UNSPECIFIED: ICD-10-CM

## 2023-05-30 ENCOUNTER — OFFICE VISIT (OUTPATIENT)
Age: 88
End: 2023-05-30
Payer: MEDICARE

## 2023-05-30 VITALS
HEIGHT: 62 IN | DIASTOLIC BLOOD PRESSURE: 64 MMHG | HEART RATE: 66 BPM | WEIGHT: 116.8 LBS | RESPIRATION RATE: 16 BRPM | BODY MASS INDEX: 21.49 KG/M2 | OXYGEN SATURATION: 97 % | SYSTOLIC BLOOD PRESSURE: 116 MMHG

## 2023-05-30 DIAGNOSIS — M81.0 AGE-RELATED OSTEOPOROSIS WITHOUT CURRENT PATHOLOGICAL FRACTURE: Primary | ICD-10-CM

## 2023-05-30 PROCEDURE — G8420 CALC BMI NORM PARAMETERS: HCPCS | Performed by: INTERNAL MEDICINE

## 2023-05-30 PROCEDURE — 1090F PRES/ABSN URINE INCON ASSESS: CPT | Performed by: INTERNAL MEDICINE

## 2023-05-30 PROCEDURE — 99214 OFFICE O/P EST MOD 30 MIN: CPT | Performed by: INTERNAL MEDICINE

## 2023-05-30 PROCEDURE — G8427 DOCREV CUR MEDS BY ELIG CLIN: HCPCS | Performed by: INTERNAL MEDICINE

## 2023-05-30 PROCEDURE — 1123F ACP DISCUSS/DSCN MKR DOCD: CPT | Performed by: INTERNAL MEDICINE

## 2023-05-30 PROCEDURE — 1036F TOBACCO NON-USER: CPT | Performed by: INTERNAL MEDICINE

## 2023-05-30 RX ORDER — ROSUVASTATIN CALCIUM 20 MG/1
TABLET, COATED ORAL
Qty: 90 TABLET | Refills: 1 | Status: SHIPPED | OUTPATIENT
Start: 2023-05-30

## 2023-05-30 NOTE — PROGRESS NOTES
Chief Complaint   Patient presents with    Follow-up    Osteoporosis        History of Present Illness: Blas Pope is a  80 y.o. female with a past medical hx significant for IFG seen in referral from  Bren Huerta MD for discussion related to osteoporosis management. Initial visit:   Had rotator cuff injury following a fall, had 2 black eyes. Menopause was at least 62 years ago, 6 kids. Never smoker, took fosamax for 5 years. Has dentures, full set. Eats 2 meals per day- cereal  both meals with 2% milk.               07/20/2022: Received prolia in Aug and Feb- next dose Aug 29. Last DEXA 04/2021. Is bored at home, living with son. Unable to have garden because  he brings a lot of junk in to the yard. Does have some memory difficulties and hearing difficulties. Did not have any trouble with the Prolia. 05/30/2023: 09/07/2022 and 03/07/2023 received prolia- no issues with the med. 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 Does not use handlebar to get in /out shower. Uses step stool to work around house. Continues taking vitamin D. Does not want to use giron/walker/do PT at this time.         Current Outpatient Medications:     rosuvastatin (CRESTOR) 20 MG tablet, TAKE 1 TABLET BY MOUTH EVERY DAY, Disp: 90 tablet, Rfl: 1    aspirin 81 MG EC tablet, Take 1 tablet by mouth daily, Disp: , Rfl:     levothyroxine (SYNTHROID) 50 MCG tablet, TAKE 1 TABLET BY MOUTH EVERY DAY BEFORE BREAKFAST, Disp: , Rfl:     losartan (COZAAR) 50 MG tablet, TAKE 1 TABLET BY MOUTH EVERY DAY, Disp: , Rfl:     Lutein-Zeaxanthin 25-5 MG CAPS, Take by mouth, Disp: , Rfl:     omeprazole (PRILOSEC) 20 MG delayed release capsule, TAKE 2 CAPSULES BY MOUTH EVERY DAY, Disp: , Rfl:     albuterol sulfate (PROAIR RESPICLICK) 168 (90 Base) MCG/ACT aerosol powder inhalation, Inhale 1 puff into the lungs every 4 hours as needed (Patient not taking: Reported on 5/30/2023), Disp: , Rfl:          Social Hx: lives with son- 62 in [place]   Non

## 2023-07-12 ENCOUNTER — TELEPHONE (OUTPATIENT)
Age: 88
End: 2023-07-12

## 2023-07-12 ENCOUNTER — NURSE TRIAGE (OUTPATIENT)
Dept: OTHER | Facility: CLINIC | Age: 88
End: 2023-07-12

## 2023-07-12 NOTE — TELEPHONE ENCOUNTER
Daughter called Patient has been experiencing chronic back pain and has not been johana to walk on her left foot she has been dragging it everywhere, Has been going on for a month off and on.  Can Dr Camacho Candelario fit her in or does she want her to go to ER?    [phone]

## 2023-07-12 NOTE — TELEPHONE ENCOUNTER
Location of patient: [facility] call from Adryan Black at [facility] with [employer]. Subjective: Caller states \"She has been complaining about dragging her foot\"     Current Symptoms: Dragging her L foot. Chronic back pain. Onset: 1 month ago; waxing and waning    Associated Symptoms: NA    Pain Severity: 0/10; N/A; none    Temperature: denies fever     LMP: NA Pregnant: NA    Recommended disposition: Go to ED/UCC Now (Or to Office with PCP Approval)    Care advice provided, patient verbalizes understanding; denies any other questions or concerns; instructed to call back for any new or worsening symptoms. Writer provided warm transfer to [employer] at [facility] for second level triage. Attention Provider: Thank you for allowing me to participate in the care of your patient. The patient was connected to triage in response to information provided to the ECC/PSC. Please do not respond through this encounter as the response is not directed to a shared pool.     Reason for Disposition   Weakness of a leg or foot (e.g., unable to bear weight, dragging foot)    Protocols used: Back Pain-ADULT-[place]

## 2023-07-12 NOTE — TELEPHONE ENCOUNTER
Called patients daughter back and relayed Dr. Parisa Garcia message and she understood and was going to take her to the ER

## 2023-07-30 NOTE — TELEPHONE ENCOUNTER
----- Message from Shopparity sent at 4/13/2022 10:58 AM EDT -----  Subject: Refill Request    QUESTIONS  Name of Medication? traMADoL (ULTRAM) 50 mg tablet  Patient-reported dosage and instructions? Take 1 Tablet by mouth daily as   needed for Pain for up to 14 days. Please be sure to ONLY take medication   for severe pain. No more than 1 tablet per day. How many days do you have left? 0  Preferred Pharmacy? [facility]ACY #[id number]  Pharmacy phone number (if available)? [phone]  Additional Information for Provider? Patient's daughter Prudencio Stains calling in   on behalf of patient to request this refill. She said that patient's back   has been hurting her a bit more, lately. ---------------------------------------------------------------------------  --------------  Kayden MEZA  What is the best way for the office to contact you? OK to leave message on   voicemail  Preferred Call Back Phone Number? [phone]  ---------------------------------------------------------------------------  --------------  SCRIPT ANSWERS  Relationship to Patient? Guardian  Representative Name? Marilynn Corona  Is the Representative on the appropriate HIPAA document in Epic?  Yes unchanged unchanged unchanged unchanged unchanged unchanged unchanged

## 2023-07-31 RX ORDER — OMEPRAZOLE 20 MG/1
CAPSULE, DELAYED RELEASE ORAL
Qty: 180 CAPSULE | Refills: 1 | Status: SHIPPED | OUTPATIENT
Start: 2023-07-31

## 2023-09-19 DIAGNOSIS — E78.00 PURE HYPERCHOLESTEROLEMIA, UNSPECIFIED: ICD-10-CM

## 2023-09-19 RX ORDER — ROSUVASTATIN CALCIUM 20 MG/1
TABLET, COATED ORAL
Qty: 90 TABLET | Refills: 1 | Status: SHIPPED | OUTPATIENT
Start: 2023-09-19

## 2023-09-19 RX ORDER — LOSARTAN POTASSIUM 50 MG/1
TABLET ORAL
Qty: 90 TABLET | Refills: 1 | Status: SHIPPED | OUTPATIENT
Start: 2023-09-19

## 2023-11-01 RX ORDER — LEVOTHYROXINE SODIUM 0.05 MG/1
TABLET ORAL
Qty: 90 TABLET | Refills: 1 | Status: SHIPPED | OUTPATIENT
Start: 2023-11-01

## 2023-12-12 ENCOUNTER — OFFICE VISIT (OUTPATIENT)
Age: 88
End: 2023-12-12
Payer: MEDICARE

## 2023-12-12 VITALS
WEIGHT: 120 LBS | OXYGEN SATURATION: 93 % | RESPIRATION RATE: 20 BRPM | BODY MASS INDEX: 22.08 KG/M2 | SYSTOLIC BLOOD PRESSURE: 129 MMHG | HEIGHT: 62 IN | HEART RATE: 60 BPM | DIASTOLIC BLOOD PRESSURE: 71 MMHG

## 2023-12-12 DIAGNOSIS — M81.0 AGE-RELATED OSTEOPOROSIS WITHOUT CURRENT PATHOLOGICAL FRACTURE: Primary | ICD-10-CM

## 2023-12-12 PROCEDURE — 1090F PRES/ABSN URINE INCON ASSESS: CPT | Performed by: INTERNAL MEDICINE

## 2023-12-12 PROCEDURE — G8484 FLU IMMUNIZE NO ADMIN: HCPCS | Performed by: INTERNAL MEDICINE

## 2023-12-12 PROCEDURE — G8420 CALC BMI NORM PARAMETERS: HCPCS | Performed by: INTERNAL MEDICINE

## 2023-12-12 PROCEDURE — G8428 CUR MEDS NOT DOCUMENT: HCPCS | Performed by: INTERNAL MEDICINE

## 2023-12-12 PROCEDURE — 1123F ACP DISCUSS/DSCN MKR DOCD: CPT | Performed by: INTERNAL MEDICINE

## 2023-12-12 PROCEDURE — 99214 OFFICE O/P EST MOD 30 MIN: CPT | Performed by: INTERNAL MEDICINE

## 2023-12-12 PROCEDURE — 1036F TOBACCO NON-USER: CPT | Performed by: INTERNAL MEDICINE

## 2023-12-12 NOTE — PROGRESS NOTES
5/30/2023), Disp: , Rfl:     aspirin 81 MG EC tablet, Take 1 tablet by mouth daily, Disp: , Rfl:     Lutein-Zeaxanthin 25-5 MG CAPS, Take by mouth, Disp: , Rfl:          Social Hx: lives with son- 62 in [place]   Non smoker      Review of Systems:see HPI       Physical Examination:  There were no vitals taken for this visit. - GENERAL: NCAT, Appears well nourished   - EYES: EOMI, non-icteric, no proptosis   - Ear/Nose/Throat: NCAT, no visible inflammation or masses    - CARDIOVASCULAR: no cyanosis, no visible JVD   - RESPIRATORY: respiratory effort normal without any distress or labored breathing   - MUSCULOSKELETAL: Normal ROM of neck and upper extremities observed   - SKIN: No rash on face  - NEUROLOGIC:   No facial asymmetry (Cranial nerve 7 motor function), No gaze palsy   - PSYCHIATRIC: Normal affect, Normal insight and judgement       Data Reviewed:                05/2023:       Assessment/Plan: This is a very pleasant 79 yo female with a past medical hx significant for IFG seen in referral from Xiomara Tran MD for discussion related to management of osteoporosis. Has previously taken bisphosphonate for 5 years with maintenance  of bone mineral density. Has received 4 doses of Prolia as of May 2023. Reviewed with patient and her oldest daughter today the fact that this medication cannot be discontinued or the risk of fracture increases dramatically. We will  plan on reassessing DEXA in April 2025, 2 years after the last. Remove loose rugs from home, encourage \"sittersize\" for R extensor strengthening given R foot impairments.        #osteoporosis   -s/p 5 years of bisphosphonate   -Received 4 doses of prolia as of 05/2023   -Repeat DEXA in April 2023 with improvement in lumbar spine   -discussed risk of hypOcalcemia with this med, presents as perioral numbness and numbness in fingertips   -would be very surprised if this occurred given normocalcemia and normal renal fx currently   -continue 2x

## 2024-01-29 RX ORDER — OMEPRAZOLE 20 MG/1
CAPSULE, DELAYED RELEASE ORAL
Qty: 180 CAPSULE | Refills: 1 | Status: SHIPPED | OUTPATIENT
Start: 2024-01-29

## 2024-02-22 ENCOUNTER — OFFICE VISIT (OUTPATIENT)
Age: 89
End: 2024-02-22
Payer: MEDICARE

## 2024-02-22 VITALS
SYSTOLIC BLOOD PRESSURE: 110 MMHG | RESPIRATION RATE: 20 BRPM | DIASTOLIC BLOOD PRESSURE: 58 MMHG | BODY MASS INDEX: 21.51 KG/M2 | WEIGHT: 116.9 LBS | OXYGEN SATURATION: 95 % | HEIGHT: 62 IN | HEART RATE: 78 BPM

## 2024-02-22 DIAGNOSIS — M25.511 CHRONIC RIGHT SHOULDER PAIN: ICD-10-CM

## 2024-02-22 DIAGNOSIS — E55.9 VITAMIN D DEFICIENCY, UNSPECIFIED: ICD-10-CM

## 2024-02-22 DIAGNOSIS — Z00.00 MEDICARE ANNUAL WELLNESS VISIT, SUBSEQUENT: Primary | ICD-10-CM

## 2024-02-22 DIAGNOSIS — J45.30 MILD PERSISTENT REACTIVE AIRWAY DISEASE WITHOUT COMPLICATION: ICD-10-CM

## 2024-02-22 DIAGNOSIS — G89.29 CHRONIC RIGHT SHOULDER PAIN: ICD-10-CM

## 2024-02-22 DIAGNOSIS — E02 SUBCLINICAL IODINE-DEFICIENCY HYPOTHYROIDISM: ICD-10-CM

## 2024-02-22 DIAGNOSIS — M62.838 MUSCLE SPASM: ICD-10-CM

## 2024-02-22 DIAGNOSIS — E78.2 MIXED HYPERLIPIDEMIA: ICD-10-CM

## 2024-02-22 DIAGNOSIS — B35.1 ONYCHOMYCOSIS: ICD-10-CM

## 2024-02-22 PROCEDURE — G0439 PPPS, SUBSEQ VISIT: HCPCS | Performed by: INTERNAL MEDICINE

## 2024-02-22 PROCEDURE — 1123F ACP DISCUSS/DSCN MKR DOCD: CPT | Performed by: INTERNAL MEDICINE

## 2024-02-22 PROCEDURE — G8484 FLU IMMUNIZE NO ADMIN: HCPCS | Performed by: INTERNAL MEDICINE

## 2024-02-22 RX ORDER — TIZANIDINE 2 MG/1
2-4 TABLET ORAL NIGHTLY PRN
Qty: 30 TABLET | Refills: 0 | Status: SHIPPED | OUTPATIENT
Start: 2024-02-22 | End: 2024-03-23

## 2024-02-22 RX ORDER — ALBUTEROL SULFATE 90 UG/1
2 AEROSOL, METERED RESPIRATORY (INHALATION) EVERY 4 HOURS PRN
Qty: 18 G | Refills: 5 | Status: SHIPPED | OUTPATIENT
Start: 2024-02-22

## 2024-02-22 ASSESSMENT — LIFESTYLE VARIABLES
HOW OFTEN DO YOU HAVE A DRINK CONTAINING ALCOHOL: NEVER
HOW MANY STANDARD DRINKS CONTAINING ALCOHOL DO YOU HAVE ON A TYPICAL DAY: PATIENT DOES NOT DRINK

## 2024-02-22 ASSESSMENT — PATIENT HEALTH QUESTIONNAIRE - PHQ9
SUM OF ALL RESPONSES TO PHQ9 QUESTIONS 1 & 2: 0
SUM OF ALL RESPONSES TO PHQ QUESTIONS 1-9: 0
SUM OF ALL RESPONSES TO PHQ QUESTIONS 1-9: 0
2. FEELING DOWN, DEPRESSED OR HOPELESS: 0
SUM OF ALL RESPONSES TO PHQ QUESTIONS 1-9: 0
SUM OF ALL RESPONSES TO PHQ QUESTIONS 1-9: 0
1. LITTLE INTEREST OR PLEASURE IN DOING THINGS: 0

## 2024-02-22 NOTE — PATIENT INSTRUCTIONS
yourself.  Watch closely for changes in your health, and be sure to contact your doctor if you have any problems.  Where can you learn more?  Go to https://www.[employer].net/patientEd and enter F075 to learn more about \"A Healthy Heart: Care Instructions.\"  Current as of: June 25, 2023               Content Version: 13.9  © 3893-5650 [employer].   Care instructions adapted under license by [employer]. If you have questions about a medical condition or this instruction, always ask your healthcare professional. [employer] disclaims any warranty or liability for your use of this information.      Personalized Preventive Plan for Jazmyn Simon - 2/22/2024  Medicare offers a range of preventive health benefits. Some of the tests and screenings are paid in full while other may be subject to a deductible, co-insurance, and/or copay.    Some of these benefits include a comprehensive review of your medical history including lifestyle, illnesses that may run in your family, and various assessments and screenings as appropriate.    After reviewing your medical record and screening and assessments performed today your provider may have ordered immunizations, labs, imaging, and/or referrals for you.  A list of these orders (if applicable) as well as your Preventive Care list are included within your After Visit Summary for your review.    Other Preventive Recommendations:    A preventive eye exam performed by an eye specialist is recommended every 1-2 years to screen for glaucoma; cataracts, macular degeneration, and other eye disorders.  A preventive dental visit is recommended every 6 months.  Try to get at least 150 minutes of exercise per week or 10,000 steps per day on a pedometer .  Order or download the FREE \"Exercise & Physical Activity: Your Everyday Guide\" from [employer]. Call 1-134.491.1602 or search [employer] online.  You need 6857-6128 mg

## 2024-02-22 NOTE — PROGRESS NOTES
Identified pt with two pt identifiers(name and ). Reviewed record in preparation for visit and have obtained necessary documentation. All patient medications has been reviewed.  Chief Complaint   Patient presents with    Medicare AWV       Vitals:    24 1324   BP: (!) 110/58   Pulse: 78   Resp: 20   SpO2: 95%                   Coordination of Care Questionnaire:   1) Have you been to an emergency room, urgent care, or hospitalized since your last visit?   no       2. Have seen or consulted any other health care provider since your last visit? no        Patient is accompanied by daughters I have received verbal consent from Jazmyn Simon to discuss any/all medical information while they are present in the room.

## 2024-02-22 NOTE — PROGRESS NOTES
Medicare Annual Wellness Visit    Jazmyn Simon is here for Medicare AWV    Assessment & Plan   Medicare annual wellness visit, subsequent  Anticipatory guidance discussed.   Immunizations reviewed  HM updated.   Chronic right shoulder pain  -     XR SHOULDER RIGHT (MIN 2 VIEWS); Future  Muscle spasm  -     tiZANidine (ZANAFLEX) 2 MG tablet; Take 1-2 tablets by mouth nightly as needed (muscle spasm), Disp-30 tablet, R-0Normal  Mild persistent reactive airway disease without complication  -     albuterol sulfate HFA (PROVENTIL;VENTOLIN;PROAIR) 108 (90 Base) MCG/ACT inhaler; Inhale 2 puffs into the lungs every 4 hours as needed for Wheezing, Disp-18 g, R-5Normal  Vitamin D deficiency, unspecified  -     Vitamin D 25 Hydroxy; Future  Subclinical iodine-deficiency hypothyroidism  -     TSH; Future  -     T4, Free; Future  Mixed hyperlipidemia  -     Comprehensive Metabolic Panel; Future  -     CBC with Auto Differential; Future  -     Lipid Panel; Future  Onychomycosis  -     External Referral To Podiatry    I have discussed the diagnosis with the patient and the intended treatment plan as seen in the above orders. The patient has received an after-visit summary and questions were answered concerning future plans. Asked to return should symptoms worsen or not improve with treatment. Any pending labs and studies will be relayed to patient when they become available.     Pt verbalizes understanding of plan of care and denies further questions or concerns at this time.     Follow up:  Return in 1 year (on 2/22/2025), or if symptoms worsen or fail to improve, for 6-month follow up chronic medical problems.    Recommendations for Preventive Services Due: see orders and patient instructions/AVS.  Recommended screening schedule for the next 5-10 years is provided to the patient in written form: see Patient Instructions/AVS.          Subjective   89F who presents today for her annual wellness visit. She has been doing well.

## 2024-03-26 ENCOUNTER — NURSE ONLY (OUTPATIENT)
Age: 89
End: 2024-03-26
Payer: MEDICARE

## 2024-03-26 ENCOUNTER — HOSPITAL ENCOUNTER (OUTPATIENT)
Facility: HOSPITAL | Age: 89
Discharge: HOME | End: 2024-03-29
Payer: MEDICARE

## 2024-03-26 DIAGNOSIS — M25.511 CHRONIC RIGHT SHOULDER PAIN: ICD-10-CM

## 2024-03-26 DIAGNOSIS — E78.2 MIXED HYPERLIPIDEMIA: ICD-10-CM

## 2024-03-26 DIAGNOSIS — E55.9 VITAMIN D DEFICIENCY, UNSPECIFIED: ICD-10-CM

## 2024-03-26 DIAGNOSIS — G89.29 CHRONIC RIGHT SHOULDER PAIN: ICD-10-CM

## 2024-03-26 DIAGNOSIS — E02 SUBCLINICAL IODINE-DEFICIENCY HYPOTHYROIDISM: ICD-10-CM

## 2024-03-26 PROCEDURE — 73030 X-RAY EXAM OF SHOULDER: CPT

## 2024-03-28 LAB
25(OH)D3 SERPL-MCNC: 50.1 NG/ML (ref 30–100)
ALBUMIN SERPL-MCNC: 3.9 G/DL (ref 3.5–5)
ALBUMIN/GLOB SERPL: 1.3 (ref 1.1–2.2)
ALP SERPL-CCNC: 61 U/L (ref 45–117)
ALT SERPL-CCNC: 22 U/L (ref 12–78)
ANION GAP SERPL CALC-SCNC: 3 MMOL/L (ref 5–15)
AST SERPL-CCNC: 31 U/L (ref 15–37)
BASOPHILS # BLD: 0.1 K/UL (ref 0–0.1)
BASOPHILS NFR BLD: 1 % (ref 0–1)
BILIRUB SERPL-MCNC: 0.4 MG/DL (ref 0.2–1)
BUN SERPL-MCNC: 18 MG/DL (ref 6–20)
BUN/CREAT SERPL: 14 (ref 12–20)
CALCIUM SERPL-MCNC: 9.2 MG/DL (ref 8.5–10.1)
CHLORIDE SERPL-SCNC: 110 MMOL/L (ref 97–108)
CHOLEST SERPL-MCNC: 129 MG/DL
CO2 SERPL-SCNC: 29 MMOL/L (ref 21–32)
CREAT SERPL-MCNC: 1.29 MG/DL (ref 0.55–1.02)
DIFFERENTIAL METHOD BLD: ABNORMAL
EOSINOPHIL # BLD: 0.1 K/UL (ref 0–0.4)
EOSINOPHIL NFR BLD: 1 % (ref 0–7)
ERYTHROCYTE [DISTWIDTH] IN BLOOD BY AUTOMATED COUNT: 13.9 % (ref 11.5–14.5)
GLOBULIN SER CALC-MCNC: 2.9 G/DL (ref 2–4)
GLUCOSE SERPL-MCNC: 92 MG/DL (ref 65–100)
HCT VFR BLD AUTO: 47.4 % (ref 35–47)
HDLC SERPL-MCNC: 54 MG/DL
HDLC SERPL: 2.4 (ref 0–5)
HGB BLD-MCNC: 15 G/DL (ref 11.5–16)
IMM GRANULOCYTES # BLD AUTO: 0 K/UL (ref 0–0.04)
IMM GRANULOCYTES NFR BLD AUTO: 0 % (ref 0–0.5)
LDLC SERPL CALC-MCNC: 46.4 MG/DL (ref 0–100)
LYMPHOCYTES # BLD: 2.2 K/UL (ref 0.8–3.5)
LYMPHOCYTES NFR BLD: 30 % (ref 12–49)
MCH RBC QN AUTO: 30.2 PG (ref 26–34)
MCHC RBC AUTO-ENTMCNC: 31.6 G/DL (ref 30–36.5)
MCV RBC AUTO: 95.4 FL (ref 80–99)
MONOCYTES # BLD: 0.5 K/UL (ref 0–1)
MONOCYTES NFR BLD: 6 % (ref 5–13)
NEUTS SEG # BLD: 4.6 K/UL (ref 1.8–8)
NEUTS SEG NFR BLD: 62 % (ref 32–75)
NRBC # BLD: 0 K/UL (ref 0–0.01)
NRBC BLD-RTO: 0 PER 100 WBC
PLATELET # BLD AUTO: 199 K/UL (ref 150–400)
PMV BLD AUTO: 10.3 FL (ref 8.9–12.9)
POTASSIUM SERPL-SCNC: 4.3 MMOL/L (ref 3.5–5.1)
PROT SERPL-MCNC: 6.8 G/DL (ref 6.4–8.2)
RBC # BLD AUTO: 4.97 M/UL (ref 3.8–5.2)
SODIUM SERPL-SCNC: 142 MMOL/L (ref 136–145)
T4 FREE SERPL-MCNC: 1.2 NG/DL (ref 0.82–1.77)
TRIGL SERPL-MCNC: 143 MG/DL
TSH SERPL DL<=0.05 MIU/L-ACNC: 0.67 UIU/ML (ref 0.36–3.74)
VLDLC SERPL CALC-MCNC: 28.6 MG/DL
WBC # BLD AUTO: 7.4 K/UL (ref 3.6–11)

## 2024-03-29 ENCOUNTER — TELEPHONE (OUTPATIENT)
Age: 89
End: 2024-03-29

## 2024-03-29 NOTE — TELEPHONE ENCOUNTER
----- Message from Farzana Meade MD sent at 3/29/2024  6:46 AM EDT -----  Please let patient and her daughters know that there has been progression of the R-shoulder degenerative changes. I am happy to refer to ortho as she may need to be re-evaluated by them. I can place a referral if they so desire.   Thanks!

## 2024-05-01 RX ORDER — LEVOTHYROXINE SODIUM 0.05 MG/1
TABLET ORAL
Qty: 90 TABLET | Refills: 1 | Status: SHIPPED | OUTPATIENT
Start: 2024-05-01

## 2024-05-01 RX ORDER — LOSARTAN POTASSIUM 50 MG/1
TABLET ORAL
Qty: 90 TABLET | Refills: 1 | Status: SHIPPED | OUTPATIENT
Start: 2024-05-01

## 2024-05-15 ENCOUNTER — TELEPHONE (OUTPATIENT)
Age: 89
End: 2024-05-15

## 2024-05-15 DIAGNOSIS — G89.29 CHRONIC RIGHT SHOULDER PAIN: Primary | ICD-10-CM

## 2024-05-15 DIAGNOSIS — M80.00XS AGE-RELATED OSTEOPOROSIS WITH CURRENT PATHOLOGICAL FRACTURE, SEQUELA: Primary | ICD-10-CM

## 2024-05-15 DIAGNOSIS — M25.511 CHRONIC RIGHT SHOULDER PAIN: Primary | ICD-10-CM

## 2024-05-15 RX ORDER — ACETAMINOPHEN 325 MG/1
650 TABLET ORAL
OUTPATIENT
Start: 2024-05-15

## 2024-05-15 RX ORDER — ALBUTEROL SULFATE 90 UG/1
4 AEROSOL, METERED RESPIRATORY (INHALATION) PRN
OUTPATIENT
Start: 2024-05-15

## 2024-05-15 RX ORDER — EPINEPHRINE 1 MG/ML
0.3 INJECTION, SOLUTION, CONCENTRATE INTRAVENOUS PRN
OUTPATIENT
Start: 2024-05-15

## 2024-05-15 RX ORDER — FAMOTIDINE 10 MG/ML
20 INJECTION, SOLUTION INTRAVENOUS
OUTPATIENT
Start: 2024-05-15

## 2024-05-15 RX ORDER — DIPHENHYDRAMINE HYDROCHLORIDE 50 MG/ML
50 INJECTION INTRAMUSCULAR; INTRAVENOUS
OUTPATIENT
Start: 2024-05-15

## 2024-05-15 RX ORDER — ONDANSETRON 2 MG/ML
8 INJECTION INTRAMUSCULAR; INTRAVENOUS
OUTPATIENT
Start: 2024-05-15

## 2024-05-15 RX ORDER — SODIUM CHLORIDE 9 MG/ML
INJECTION, SOLUTION INTRAVENOUS CONTINUOUS
OUTPATIENT
Start: 2024-05-15

## 2024-05-15 NOTE — TELEPHONE ENCOUNTER
Johnny, pt daughter's called and stated that a new order is needed for the  prolia infusion.  Thank you.

## 2024-05-15 NOTE — TELEPHONE ENCOUNTER
Wilma (daughter) would like an order for pt so she can get a shot in her shoulder. Wilma said Dr. Meade and pt have talked about it before.

## 2024-05-16 NOTE — TELEPHONE ENCOUNTER
Spoke to patients daughter regarding referral. Information was given and she acknowledged understanding.

## 2024-06-01 DIAGNOSIS — E78.00 PURE HYPERCHOLESTEROLEMIA, UNSPECIFIED: ICD-10-CM

## 2024-06-03 RX ORDER — ROSUVASTATIN CALCIUM 20 MG/1
TABLET, COATED ORAL
Qty: 90 TABLET | Refills: 0 | Status: SHIPPED | OUTPATIENT
Start: 2024-06-03

## 2024-06-17 ENCOUNTER — HOSPITAL ENCOUNTER (OUTPATIENT)
Facility: HOSPITAL | Age: 89
Setting detail: INFUSION SERIES
Discharge: HOME | End: 2024-06-17
Payer: MEDICARE

## 2024-06-17 VITALS
SYSTOLIC BLOOD PRESSURE: 116 MMHG | TEMPERATURE: 97.8 F | DIASTOLIC BLOOD PRESSURE: 69 MMHG | HEART RATE: 60 BPM | RESPIRATION RATE: 18 BRPM

## 2024-06-17 DIAGNOSIS — M80.00XS AGE-RELATED OSTEOPOROSIS WITH CURRENT PATHOLOGICAL FRACTURE, SEQUELA: Primary | ICD-10-CM

## 2024-06-17 LAB
ALBUMIN SERPL-MCNC: 3.4 G/DL (ref 3.5–5)
ALBUMIN/GLOB SERPL: 1 (ref 1.1–2.2)
ALP SERPL-CCNC: 54 U/L (ref 45–117)
ALT SERPL-CCNC: 19 U/L (ref 12–78)
ANION GAP BLD CALC-SCNC: 3 MMOL/L (ref 10–20)
ANION GAP SERPL CALC-SCNC: 2 MMOL/L (ref 5–15)
AST SERPL-CCNC: 31 U/L (ref 15–37)
BILIRUB SERPL-MCNC: 0.2 MG/DL (ref 0.2–1)
BUN SERPL-MCNC: 16 MG/DL (ref 6–20)
BUN/CREAT SERPL: 12 (ref 12–20)
CA-I BLD-MCNC: 1.21 MMOL/L (ref 1.12–1.32)
CALCIUM SERPL-MCNC: 9.2 MG/DL (ref 8.5–10.1)
CHLORIDE BLD-SCNC: 113 MMOL/L (ref 98–107)
CHLORIDE SERPL-SCNC: 113 MMOL/L (ref 97–108)
CO2 BLD-SCNC: 28 MMOL/L (ref 21–32)
CO2 SERPL-SCNC: 28 MMOL/L (ref 21–32)
CREAT BLD-MCNC: 1.18 MG/DL (ref 0.6–1.3)
CREAT SERPL-MCNC: 1.3 MG/DL (ref 0.55–1.02)
GLOBULIN SER CALC-MCNC: 3.4 G/DL (ref 2–4)
GLUCOSE BLD-MCNC: 95 MG/DL (ref 70–110)
GLUCOSE SERPL-MCNC: 91 MG/DL (ref 65–100)
PHOSPHATE SERPL-MCNC: 3.7 MG/DL (ref 2.6–4.7)
POTASSIUM BLD-SCNC: 4.4 MMOL/L (ref 3.5–5.1)
POTASSIUM SERPL-SCNC: 4.2 MMOL/L (ref 3.5–5.1)
PROT SERPL-MCNC: 6.8 G/DL (ref 6.4–8.2)
SERVICE CMNT-IMP: ABNORMAL
SODIUM BLD-SCNC: 144 MMOL/L (ref 136–145)
SODIUM SERPL-SCNC: 143 MMOL/L (ref 136–145)

## 2024-06-17 PROCEDURE — 80047 BASIC METABLC PNL IONIZED CA: CPT

## 2024-06-17 PROCEDURE — 36415 COLL VENOUS BLD VENIPUNCTURE: CPT

## 2024-06-17 PROCEDURE — 84100 ASSAY OF PHOSPHORUS: CPT

## 2024-06-17 PROCEDURE — 96372 THER/PROPH/DIAG INJ SC/IM: CPT

## 2024-06-17 PROCEDURE — 80053 COMPREHEN METABOLIC PANEL: CPT

## 2024-06-17 PROCEDURE — 6360000002 HC RX W HCPCS: Performed by: INTERNAL MEDICINE

## 2024-06-17 RX ORDER — EPINEPHRINE 1 MG/ML
0.3 INJECTION, SOLUTION INTRAMUSCULAR; SUBCUTANEOUS PRN
OUTPATIENT
Start: 2024-12-15

## 2024-06-17 RX ORDER — ONDANSETRON 2 MG/ML
8 INJECTION INTRAMUSCULAR; INTRAVENOUS
OUTPATIENT
Start: 2024-12-15

## 2024-06-17 RX ORDER — ACETAMINOPHEN 325 MG/1
650 TABLET ORAL
OUTPATIENT
Start: 2024-12-15

## 2024-06-17 RX ORDER — SODIUM CHLORIDE 9 MG/ML
INJECTION, SOLUTION INTRAVENOUS CONTINUOUS
OUTPATIENT
Start: 2024-12-15

## 2024-06-17 RX ORDER — ALBUTEROL SULFATE 90 UG/1
4 AEROSOL, METERED RESPIRATORY (INHALATION) PRN
OUTPATIENT
Start: 2024-12-15

## 2024-06-17 RX ORDER — DIPHENHYDRAMINE HYDROCHLORIDE 50 MG/ML
50 INJECTION INTRAMUSCULAR; INTRAVENOUS
OUTPATIENT
Start: 2024-12-15

## 2024-06-17 RX ADMIN — DENOSUMAB 60 MG: 60 INJECTION SUBCUTANEOUS at 14:43

## 2024-06-17 NOTE — PROGRESS NOTES
[facility] Short Note                       Date: 2024    Name: Jazmyn Simon    MRN: [id number]         : 1927      Pt admit to [facility] for Prolia ambulatory in stable condition. Assessment completed and documented in flowsheets. No acute concerns at this time.  Please review pending lab results in CC.      Ms. Simon's vitals were reviewed:  Patient Vitals for the past 12 hrs:   Temp Pulse Resp BP   24 1400 97.8 °F (36.6 °C) 60 18 116/69         Lab results were obtained and reviewed. Labs within parameter for treatment.  Recent Results (from the past 12 hour(s))   POC CHEM 8    Collection Time: 24  2:14 PM   Result Value Ref Range    POC Ionized Calcium 1.21 1.12 - 1.32 mmol/L    POC Sodium 144 136 - 145 mmol/L    POC Potassium 4.4 3.5 - 5.1 mmol/L    POC Chloride 113 (H) 98 - 107 mmol/L    POC TCO2 28.0 21 - 32 mmol/L    Anion Gap, POC 3 (L) 10 - 20 mmol/L    POC Glucose 95 70 - 110 mg/dL    POC Creatinine 1.18 0.6 - 1.3 mg/dL    eGFR, POC 42 (L) >60 ml/min/1.73m2    UA Comment Comment Not Indicated.         Medications given: L arm  Medications Administered         denosumab (PROLIA) SC injection 60 mg Admin Date  2024 Action  Given Dose  60 mg Route  SubCUTAneous Administered By  Dashawn Avilez RN            Medication education reinforced with patient and they verbalize understanding.    Ms. Simon tolerated the injection and [facility]. Patient is aware if future appointments.    Future Appointments   Date Time Provider Department Center   2024 11:00 AM Farzana Meade MD P[facility] AMB   2024  3:10 PM Megan Saenz MD RDE [facility]   2024  2:00 PM [facility]EL [facility] 1 [facility]       DASHAWN AVILEZ RN  2024  2:46 PM

## 2024-07-15 ENCOUNTER — TELEPHONE (OUTPATIENT)
Age: 89
End: 2024-07-15

## 2024-07-29 RX ORDER — OMEPRAZOLE 20 MG/1
CAPSULE, DELAYED RELEASE ORAL
Qty: 180 CAPSULE | Refills: 1 | Status: SHIPPED | OUTPATIENT
Start: 2024-07-29

## 2024-08-22 ENCOUNTER — OFFICE VISIT (OUTPATIENT)
Age: 89
End: 2024-08-22
Payer: MEDICARE

## 2024-08-22 VITALS
BODY MASS INDEX: 18.73 KG/M2 | DIASTOLIC BLOOD PRESSURE: 62 MMHG | RESPIRATION RATE: 18 BRPM | WEIGHT: 112.4 LBS | HEIGHT: 65 IN | SYSTOLIC BLOOD PRESSURE: 112 MMHG | TEMPERATURE: 97.8 F

## 2024-08-22 DIAGNOSIS — M54.42 CHRONIC BILATERAL LOW BACK PAIN WITH BILATERAL SCIATICA: Primary | ICD-10-CM

## 2024-08-22 DIAGNOSIS — M54.41 CHRONIC BILATERAL LOW BACK PAIN WITH BILATERAL SCIATICA: Primary | ICD-10-CM

## 2024-08-22 DIAGNOSIS — M62.838 MUSCLE SPASM: ICD-10-CM

## 2024-08-22 DIAGNOSIS — G89.29 CHRONIC BILATERAL LOW BACK PAIN WITH BILATERAL SCIATICA: Primary | ICD-10-CM

## 2024-08-22 DIAGNOSIS — E78.2 MIXED HYPERLIPIDEMIA: ICD-10-CM

## 2024-08-22 PROCEDURE — 1036F TOBACCO NON-USER: CPT | Performed by: INTERNAL MEDICINE

## 2024-08-22 PROCEDURE — 1123F ACP DISCUSS/DSCN MKR DOCD: CPT | Performed by: INTERNAL MEDICINE

## 2024-08-22 PROCEDURE — 1090F PRES/ABSN URINE INCON ASSESS: CPT | Performed by: INTERNAL MEDICINE

## 2024-08-22 PROCEDURE — G8420 CALC BMI NORM PARAMETERS: HCPCS | Performed by: INTERNAL MEDICINE

## 2024-08-22 PROCEDURE — G8427 DOCREV CUR MEDS BY ELIG CLIN: HCPCS | Performed by: INTERNAL MEDICINE

## 2024-08-22 PROCEDURE — 99213 OFFICE O/P EST LOW 20 MIN: CPT | Performed by: INTERNAL MEDICINE

## 2024-08-22 RX ORDER — ALENDRONATE SODIUM 70 MG/1
TABLET ORAL
COMMUNITY
Start: 2017-12-06

## 2024-08-22 SDOH — ECONOMIC STABILITY: FOOD INSECURITY: WITHIN THE PAST 12 MONTHS, YOU WORRIED THAT YOUR FOOD WOULD RUN OUT BEFORE YOU GOT MONEY TO BUY MORE.: NEVER TRUE

## 2024-08-22 SDOH — ECONOMIC STABILITY: FOOD INSECURITY: WITHIN THE PAST 12 MONTHS, THE FOOD YOU BOUGHT JUST DIDN'T LAST AND YOU DIDN'T HAVE MONEY TO GET MORE.: NEVER TRUE

## 2024-08-22 SDOH — ECONOMIC STABILITY: INCOME INSECURITY: HOW HARD IS IT FOR YOU TO PAY FOR THE VERY BASICS LIKE FOOD, HOUSING, MEDICAL CARE, AND HEATING?: NOT HARD AT ALL

## 2024-08-22 NOTE — PROGRESS NOTES
[facility]   Follow-up Visit Progress Note  Patient: Jazmyn Simon  2/12/1927, 89 y.o., female  Encounter Date: 8/22/24    CHIEF COMPLAINT:  Chief Complaint   Patient presents with    Follow-up     Patient is here for a routine follow up and does have concerns about her balance         ASSESSMENT & PLAN:    ICD-10-CM    1. Chronic bilateral low back pain with bilateral sciatica  M54.42 She is being followed by ortho. Had recent injection with mixed results.     M54.41     G89.29       2. Muscle spasm  M62.838 Stable on OTC medications (Tylenol)       3. Mixed hyperlipidemia  E78.2 Lab Results   Component Value Date    CHOL 129 03/26/2024    TRIG 143 03/26/2024    HDL 54 03/26/2024    LDL 46.4 03/26/2024    VLDL 28.6 03/26/2024    CHOLHDLRATIO 2.4 03/26/2024     Stable and no change in medications at this time. She is not due fore repeat labs.          I have discussed the diagnosis with the patient and the intended treatment plan as seen in the above orders. The patient has received an after-visit summary and questions were answered concerning future plans. Asked to return should symptoms worsen or not improve with treatment. Any pending labs and studies will be relayed to patient when they become available.     Pt verbalizes understanding of plan of care and denies further questions or concerns at this time    Return in about 6 months (around 2/22/2025), or if symptoms worsen or fail to improve.    SUBJECTIVE  Jazmyn Simon is a 89 y.o. female presenting today for follow up. She is doing well on her current medications. She denies any new concerns. She has been seen by ortho and did have a TALI, but does not like how it made her feel and does not feel like it made her symptoms better. She denies any recent falls. She continues to see endocrinology for osteoporosis.   She will return next year for [facility] - Medicare.     No change in her treatment plan. She is doing really well and high functioning

## 2024-08-22 NOTE — PROGRESS NOTES
Identified pt with two pt identifiers(name and ).    Chief Complaint   Patient presents with    Follow-up     Patient is here for a routine follow up and does have concerns about her balance         Health Maintenance Due   Topic    Respiratory Syncytial Virus (RSV) Pregnant or age 60 yrs+ (1 - 1-dose 60+ series)    DTaP/Tdap/Td vaccine (1 - Tdap)    Shingles vaccine (2 of 3)    COVID-19 Vaccine (4 -  season)    Flu vaccine (1)       Wt Readings from Last 3 Encounters:   24 53 kg (116 lb 14.4 oz)   23 54.4 kg (120 lb)   23 53 kg (116 lb 12.8 oz)     Temp Readings from Last 3 Encounters:   24 97.8 °F (36.6 °C) (Temporal)     BP Readings from Last 3 Encounters:   24 116/69   24 (!) 110/58   23 129/71     Pulse Readings from Last 3 Encounters:   24 60   24 78   23 60           Depression Screening:  :         2024     1:32 PM 2023     1:37 PM 2022     1:00 PM 3/14/2022     2:00 PM   PHQ-9 Questionaire   Little interest or pleasure in doing things 0 0 0 0   Feeling down, depressed, or hopeless 0 0 0 0   PHQ-9 Total Score 0 0 0 0        Fall Risk Assessment:  :         2024     1:27 PM   Fall Risk   Do you feel unsteady or are you worried about falling?  yes   2 or more falls in past year? no   Fall with injury in past year? no        Abuse Screening:  :          No data to display                 Coordination of Care Questionnaire:  :     \"Have you been to the ER, urgent care clinic since your last visit?  Hospitalized since your last visit?\"    NO    “Have you seen or consulted any other health care providers outside of [facility] since your last visit?”    NO            Click Here for Release of Records Request

## 2024-08-22 NOTE — PATIENT INSTRUCTIONS
Make sure to get your COVID-19, Flu as well as RSV before October 31, 2024.   Make sure to drink a Ensure High Protein shake daily.   Make sure to eat well balanced meals daily.

## 2024-08-31 DIAGNOSIS — E78.00 PURE HYPERCHOLESTEROLEMIA, UNSPECIFIED: ICD-10-CM

## 2024-09-02 RX ORDER — ROSUVASTATIN CALCIUM 20 MG/1
TABLET, COATED ORAL
Qty: 90 TABLET | Refills: 0 | Status: SHIPPED | OUTPATIENT
Start: 2024-09-02

## 2024-09-18 ENCOUNTER — TELEMEDICINE (OUTPATIENT)
Age: 89
End: 2024-09-18

## 2024-09-18 DIAGNOSIS — M80.00XS AGE-RELATED OSTEOPOROSIS WITH CURRENT PATHOLOGICAL FRACTURE, SEQUELA: Primary | ICD-10-CM

## 2024-09-27 ENCOUNTER — TELEPHONE (OUTPATIENT)
Age: 89
End: 2024-09-27

## 2024-09-27 NOTE — TELEPHONE ENCOUNTER
Advise family to hold giving her Losartan due to low BP. Recheck it on Monday and call it in for Dr. Meade. If she develops dizziness, CP, SOB over weekend, go to ER.

## 2024-09-27 NOTE — TELEPHONE ENCOUNTER
Pt went to the podiatrist and they did her bp and the reading came back as 92/51. Pts daughter says this is low for her. Please advise.

## 2024-09-27 NOTE — TELEPHONE ENCOUNTER
I returned call to Wilma with no answer. Left VM to return call to office. Need to see how patient is feeling, any dizziness?

## 2024-09-27 NOTE — TELEPHONE ENCOUNTER
I called and spoke with Nelida and relayed message. They will check BP on Monday and call it in for Dr Meade, and stop Losartan due to low BP.

## 2024-10-08 ENCOUNTER — OFFICE VISIT (OUTPATIENT)
Age: 89
End: 2024-10-08
Payer: MEDICARE

## 2024-10-08 ENCOUNTER — LAB (OUTPATIENT)
Age: 89
End: 2024-10-08
Payer: MEDICARE

## 2024-10-08 VITALS
HEART RATE: 65 BPM | OXYGEN SATURATION: 98 % | SYSTOLIC BLOOD PRESSURE: 126 MMHG | WEIGHT: 115 LBS | DIASTOLIC BLOOD PRESSURE: 86 MMHG | TEMPERATURE: 97.1 F | BODY MASS INDEX: 21.16 KG/M2 | HEIGHT: 62 IN

## 2024-10-08 DIAGNOSIS — I10 PRIMARY HYPERTENSION: Primary | ICD-10-CM

## 2024-10-08 DIAGNOSIS — E55.9 VITAMIN D DEFICIENCY, UNSPECIFIED: ICD-10-CM

## 2024-10-08 DIAGNOSIS — H61.22 CERUMINOSIS, LEFT: ICD-10-CM

## 2024-10-08 DIAGNOSIS — I10 PRIMARY HYPERTENSION: ICD-10-CM

## 2024-10-08 DIAGNOSIS — E02 SUBCLINICAL IODINE-DEFICIENCY HYPOTHYROIDISM: ICD-10-CM

## 2024-10-08 DIAGNOSIS — E78.2 MIXED HYPERLIPIDEMIA: ICD-10-CM

## 2024-10-08 PROCEDURE — 69210 REMOVE IMPACTED EAR WAX UNI: CPT | Performed by: INTERNAL MEDICINE

## 2024-10-08 PROCEDURE — 99214 OFFICE O/P EST MOD 30 MIN: CPT | Performed by: INTERNAL MEDICINE

## 2024-10-08 RX ORDER — LOSARTAN POTASSIUM 25 MG/1
25 TABLET ORAL DAILY
Qty: 90 TABLET | Refills: 1 | Status: SHIPPED | OUTPATIENT
Start: 2024-10-08

## 2024-10-08 ASSESSMENT — PATIENT HEALTH QUESTIONNAIRE - PHQ9
SUM OF ALL RESPONSES TO PHQ QUESTIONS 1-9: 0
1. LITTLE INTEREST OR PLEASURE IN DOING THINGS: NOT AT ALL
SUM OF ALL RESPONSES TO PHQ QUESTIONS 1-9: 0
SUM OF ALL RESPONSES TO PHQ9 QUESTIONS 1 & 2: 0
2. FEELING DOWN, DEPRESSED OR HOPELESS: NOT AT ALL

## 2024-10-08 NOTE — PATIENT INSTRUCTIONS
Restart BP medication if BP is >140/90.   I have decreased the dose to Losartan 25 mgs.   If you restart, then restart at 25 mgs.   You can take BOOST or ENSURE daily to supplement your meals.

## 2024-10-08 NOTE — PROGRESS NOTES
Identified pt with two pt identifiers(name and ). Reviewed record in preparation for visit and have obtained necessary documentation. All patient medications has been reviewed.  Chief Complaint   Patient presents with    Follow-up    Blood Pressure Check       Vitals:    10/08/24 1255   BP: 126/86   Pulse: 65   Temp: 97.1 °F (36.2 °C)   SpO2: 98%                   Coordination of Care Questionnaire:   1) Have you been to an emergency room, urgent care, or hospitalized since your last visit?   no     2. Have seen or consulted any other health care provider since your last visit? no        Patient is accompanied by daughters I have received verbal consent from Jazmyn Simon to discuss any/all medical information while they are present in the room.

## 2024-10-08 NOTE — PROGRESS NOTES
[facility]   Follow Up Progress Note  Patient: Jazmyn Simon  2/12/1927, 89 y.o., female  Encounter Date: 10/8/24    CHIEF COMPLAINT:  Chief Complaint   Patient presents with    Follow-up    Blood Pressure Check       ASSESSMENT & PLAN:    ICD-10-CM    1. Primary hypertension  I10 losartan (COZAAR) 25 MG tablet     Comprehensive Metabolic Panel     CBC with Auto Differential      2. Mixed hyperlipidemia  E78.2       3. Vitamin D deficiency, unspecified  E55.9 Vitamin D 25 Hydroxy      4. Subclinical iodine-deficiency hypothyroidism  E02 TSH     T4, Free      5. Ceruminosis, left  H61.22 REMOVE IMPACTED EAR WAX - removed manually with curette of large chunk of brown ear wax intact.         Patient Instructions   Restart BP medication if BP is >140/90.   I have decreased the dose to Losartan 25 mgs.   If you restart, then restart at 25 mgs.   You can take BOOST or ENSURE daily to supplement your meals.     I have discussed the diagnosis with the patient and the intended treatment plan as seen in the above orders. The patient has received an after-visit summary and questions were answered concerning future plans. Asked to return should symptoms worsen or not improve with treatment. Any pending labs and studies will be relayed to patient when they become available.     Pt verbalizes understanding of plan of care and denies further questions or concerns at this time    Return if symptoms worsen or fail to improve.    SUBJECTIVE  Jazmyn Simon is a 89 y.o. female presenting today for follow up of: Hypotensive episodes. Ear wax accumulation. She did stop the Losartan about 3-days ago and her BP is stable in the office. She denies any chest pain or SOB. She is doing well generally She has been adding Boost to her diet and still walking without a walker or cane, but she has one.     Wt Readings from Last 3 Encounters:   10/08/24 52.2 kg (115 lb)   08/22/24 51 kg (112 lb 6.4 oz)   02/22/24 53 kg (116 lb

## 2024-10-09 ENCOUNTER — TELEPHONE (OUTPATIENT)
Age: 89
End: 2024-10-09

## 2024-10-09 DIAGNOSIS — E87.5 HYPERKALEMIA: Primary | ICD-10-CM

## 2024-10-09 LAB
25(OH)D3 SERPL-MCNC: 68.5 NG/ML (ref 30–100)
ALBUMIN SERPL-MCNC: 3.6 G/DL (ref 3.5–5)
ALBUMIN/GLOB SERPL: 1.3 (ref 1.1–2.2)
ALP SERPL-CCNC: 58 U/L (ref 45–117)
ALT SERPL-CCNC: 21 U/L (ref 12–78)
ANION GAP SERPL CALC-SCNC: ABNORMAL MMOL/L (ref 2–12)
AST SERPL-CCNC: 32 U/L (ref 15–37)
BASOPHILS # BLD: 0.1 K/UL (ref 0–0.1)
BASOPHILS NFR BLD: 1 % (ref 0–1)
BILIRUB SERPL-MCNC: 0.3 MG/DL (ref 0.2–1)
BUN SERPL-MCNC: 17 MG/DL (ref 6–20)
BUN/CREAT SERPL: 14 (ref 12–20)
CALCIUM SERPL-MCNC: 9.5 MG/DL (ref 8.5–10.1)
CHLORIDE SERPL-SCNC: 112 MMOL/L (ref 97–108)
CO2 SERPL-SCNC: 31 MMOL/L (ref 21–32)
CREAT SERPL-MCNC: 1.23 MG/DL (ref 0.55–1.02)
DIFFERENTIAL METHOD BLD: NORMAL
EOSINOPHIL # BLD: 0.1 K/UL (ref 0–0.4)
EOSINOPHIL NFR BLD: 2 % (ref 0–7)
ERYTHROCYTE [DISTWIDTH] IN BLOOD BY AUTOMATED COUNT: 13.4 % (ref 11.5–14.5)
GLOBULIN SER CALC-MCNC: 2.8 G/DL (ref 2–4)
GLUCOSE SERPL-MCNC: 91 MG/DL (ref 65–100)
HCT VFR BLD AUTO: 42.2 % (ref 35–47)
HGB BLD-MCNC: 13.4 G/DL (ref 11.5–16)
IMM GRANULOCYTES # BLD AUTO: 0 K/UL (ref 0–0.04)
IMM GRANULOCYTES NFR BLD AUTO: 0 % (ref 0–0.5)
LYMPHOCYTES # BLD: 1.8 K/UL (ref 0.8–3.5)
LYMPHOCYTES NFR BLD: 32 % (ref 12–49)
MCH RBC QN AUTO: 30.7 PG (ref 26–34)
MCHC RBC AUTO-ENTMCNC: 31.8 G/DL (ref 30–36.5)
MCV RBC AUTO: 96.8 FL (ref 80–99)
MONOCYTES # BLD: 0.4 K/UL (ref 0–1)
MONOCYTES NFR BLD: 8 % (ref 5–13)
NEUTS SEG # BLD: 3.1 K/UL (ref 1.8–8)
NEUTS SEG NFR BLD: 57 % (ref 32–75)
NRBC # BLD: 0 K/UL (ref 0–0.01)
NRBC BLD-RTO: 0 PER 100 WBC
PLATELET # BLD AUTO: 210 K/UL (ref 150–400)
PMV BLD AUTO: 10.4 FL (ref 8.9–12.9)
POTASSIUM SERPL-SCNC: 5.7 MMOL/L (ref 3.5–5.1)
PROT SERPL-MCNC: 6.4 G/DL (ref 6.4–8.2)
RBC # BLD AUTO: 4.36 M/UL (ref 3.8–5.2)
SODIUM SERPL-SCNC: 140 MMOL/L (ref 136–145)
T4 FREE SERPL-MCNC: 1.1 NG/DL (ref 0.8–1.5)
TSH SERPL DL<=0.05 MIU/L-ACNC: 0.7 UIU/ML (ref 0.36–3.74)
WBC # BLD AUTO: 5.5 K/UL (ref 3.6–11)

## 2024-10-09 NOTE — TELEPHONE ENCOUNTER
----- Message from Dr. Farzana Meade MD sent at 10/9/2024 12:24 PM EDT -----  Please let patient know that her potassium was elevated.   It may have been lab technique, but should be repeated by the end of the week.   Other labs are stable and normal.   I have placed orders for her to recheck the potassium.   Thanks!

## 2024-10-09 NOTE — TELEPHONE ENCOUNTER
Called patients daughter left vm to call the office for lab results as Dr. Meade wants repeat labs done this week if possible

## 2024-10-10 NOTE — RESULT ENCOUNTER NOTE
[facility] scheduled for repeat labs tomorrow

## 2024-10-11 ENCOUNTER — LAB (OUTPATIENT)
Age: 89
End: 2024-10-11

## 2024-10-11 DIAGNOSIS — E87.5 HYPERKALEMIA: ICD-10-CM

## 2024-10-12 LAB
ANION GAP SERPL CALC-SCNC: 3 MMOL/L (ref 2–12)
BUN SERPL-MCNC: 19 MG/DL (ref 6–20)
BUN/CREAT SERPL: 17 (ref 12–20)
CALCIUM SERPL-MCNC: 8.7 MG/DL (ref 8.5–10.1)
CHLORIDE SERPL-SCNC: 112 MMOL/L (ref 97–108)
CO2 SERPL-SCNC: 28 MMOL/L (ref 21–32)
CREAT SERPL-MCNC: 1.14 MG/DL (ref 0.55–1.02)
GLUCOSE SERPL-MCNC: 82 MG/DL (ref 65–100)
POTASSIUM SERPL-SCNC: 4.5 MMOL/L (ref 3.5–5.1)
SODIUM SERPL-SCNC: 143 MMOL/L (ref 136–145)

## 2024-10-25 RX ORDER — LEVOTHYROXINE SODIUM 50 UG/1
TABLET ORAL
Qty: 90 TABLET | Refills: 1 | Status: SHIPPED | OUTPATIENT
Start: 2024-10-25

## 2024-11-29 DIAGNOSIS — E78.00 PURE HYPERCHOLESTEROLEMIA, UNSPECIFIED: ICD-10-CM

## 2024-11-29 RX ORDER — ROSUVASTATIN CALCIUM 20 MG/1
TABLET, COATED ORAL
Qty: 90 TABLET | Refills: 0 | Status: SHIPPED | OUTPATIENT
Start: 2024-11-29

## 2024-12-23 ENCOUNTER — HOSPITAL ENCOUNTER (OUTPATIENT)
Facility: HOSPITAL | Age: 88
Setting detail: INFUSION SERIES
Discharge: HOME | End: 2024-12-23
Payer: MEDICARE

## 2024-12-23 VITALS
RESPIRATION RATE: 18 BRPM | HEART RATE: 62 BPM | TEMPERATURE: 97.3 F | SYSTOLIC BLOOD PRESSURE: 119 MMHG | DIASTOLIC BLOOD PRESSURE: 66 MMHG

## 2024-12-23 DIAGNOSIS — M80.00XS AGE-RELATED OSTEOPOROSIS WITH CURRENT PATHOLOGICAL FRACTURE, SEQUELA: Primary | ICD-10-CM

## 2024-12-23 LAB
ALBUMIN SERPL-MCNC: 3.3 G/DL (ref 3.5–5)
ALBUMIN/GLOB SERPL: 1.3 (ref 1.1–2.2)
ALP SERPL-CCNC: 46 U/L (ref 45–117)
ALT SERPL-CCNC: 18 U/L (ref 12–78)
ANION GAP BLD CALC-SCNC: 7.5 MMOL/L (ref 10–20)
ANION GAP SERPL CALC-SCNC: 2 MMOL/L (ref 2–12)
AST SERPL-CCNC: 27 U/L (ref 15–37)
BILIRUB SERPL-MCNC: 0.2 MG/DL (ref 0.2–1)
BUN SERPL-MCNC: 23 MG/DL (ref 6–20)
BUN/CREAT SERPL: 25 (ref 12–20)
CA-I BLD-MCNC: 1.22 MMOL/L (ref 1.15–1.33)
CALCIUM SERPL-MCNC: 8.5 MG/DL (ref 8.5–10.1)
CHLORIDE BLD-SCNC: 104 MMOL/L (ref 98–107)
CHLORIDE SERPL-SCNC: 109 MMOL/L (ref 97–108)
CO2 BLD-SCNC: 29.5 MMOL/L (ref 21–32)
CO2 SERPL-SCNC: 28 MMOL/L (ref 21–32)
CREAT BLD-MCNC: 0.94 MG/DL (ref 0.6–1.3)
CREAT SERPL-MCNC: 0.93 MG/DL (ref 0.55–1.02)
GLOBULIN SER CALC-MCNC: 2.6 G/DL (ref 2–4)
GLUCOSE BLD-MCNC: 93 MG/DL (ref 74–99)
GLUCOSE SERPL-MCNC: 79 MG/DL (ref 65–100)
PHOSPHATE SERPL-MCNC: 3.7 MG/DL (ref 2.6–4.7)
POTASSIUM BLD-SCNC: 4.4 MMOL/L (ref 3.5–5.1)
POTASSIUM SERPL-SCNC: 4.5 MMOL/L (ref 3.5–5.1)
PROT SERPL-MCNC: 5.9 G/DL (ref 6.4–8.2)
SERVICE CMNT-IMP: ABNORMAL
SODIUM BLD-SCNC: 141 MMOL/L (ref 136–145)
SODIUM SERPL-SCNC: 139 MMOL/L (ref 136–145)

## 2024-12-23 PROCEDURE — 84100 ASSAY OF PHOSPHORUS: CPT

## 2024-12-23 PROCEDURE — 6360000002 HC RX W HCPCS: Performed by: INTERNAL MEDICINE

## 2024-12-23 PROCEDURE — 80047 BASIC METABLC PNL IONIZED CA: CPT

## 2024-12-23 PROCEDURE — 96372 THER/PROPH/DIAG INJ SC/IM: CPT

## 2024-12-23 PROCEDURE — 80053 COMPREHEN METABOLIC PANEL: CPT

## 2024-12-23 PROCEDURE — 36415 COLL VENOUS BLD VENIPUNCTURE: CPT

## 2024-12-23 RX ORDER — ONDANSETRON 2 MG/ML
8 INJECTION INTRAMUSCULAR; INTRAVENOUS
OUTPATIENT
Start: 2025-06-15

## 2024-12-23 RX ORDER — EPINEPHRINE 1 MG/ML
0.3 INJECTION, SOLUTION INTRAMUSCULAR; SUBCUTANEOUS PRN
OUTPATIENT
Start: 2025-06-15

## 2024-12-23 RX ORDER — DIPHENHYDRAMINE HYDROCHLORIDE 50 MG/ML
50 INJECTION INTRAMUSCULAR; INTRAVENOUS
OUTPATIENT
Start: 2025-06-15

## 2024-12-23 RX ORDER — ALBUTEROL SULFATE 90 UG/1
4 INHALANT RESPIRATORY (INHALATION) PRN
OUTPATIENT
Start: 2025-06-15

## 2024-12-23 RX ORDER — ACETAMINOPHEN 325 MG/1
650 TABLET ORAL
OUTPATIENT
Start: 2025-06-15

## 2024-12-23 RX ORDER — SODIUM CHLORIDE 9 MG/ML
INJECTION, SOLUTION INTRAVENOUS CONTINUOUS
OUTPATIENT
Start: 2025-06-15

## 2024-12-23 RX ORDER — HYDROCORTISONE SODIUM SUCCINATE 100 MG/2ML
100 INJECTION INTRAMUSCULAR; INTRAVENOUS
OUTPATIENT
Start: 2025-06-15

## 2024-12-23 RX ADMIN — DENOSUMAB 60 MG: 60 INJECTION SUBCUTANEOUS at 14:38

## 2024-12-23 NOTE — PROGRESS NOTES
[facility] Progress Note    1400 Pt admit to [facility] for Prolia ambulatory in stable condition accompanied by family. Assessment completed. No new concerns voiced. Labs drawn peripherally and sent for processing.        /66   Pulse 62   Temp 97.3 °F (36.3 °C) (Temporal)   Resp 18     Medications Administered         denosumab (PROLIA) SC injection 60 mg Admin Date  12/23/2024 Action  Given Dose  60 mg Route  SubCUTAneous Documented By  Claribel Rosario RN          (SC, L arm)      8479 Pt tolerated treatment well.  D/c home ambulatory in no distress. Pt aware of next [facility] appointment scheduled for 06/24/2025.    Please see labs in Results tab

## 2025-02-17 ENCOUNTER — LAB (OUTPATIENT)
Age: 89
End: 2025-02-17

## 2025-02-17 DIAGNOSIS — R53.83 MALAISE AND FATIGUE: ICD-10-CM

## 2025-02-17 DIAGNOSIS — R53.81 MALAISE AND FATIGUE: ICD-10-CM

## 2025-02-17 DIAGNOSIS — R73.09 ELEVATED GLUCOSE: ICD-10-CM

## 2025-02-17 DIAGNOSIS — I10 PRIMARY HYPERTENSION: Primary | ICD-10-CM

## 2025-02-17 DIAGNOSIS — E78.2 MIXED HYPERLIPIDEMIA: ICD-10-CM

## 2025-02-17 DIAGNOSIS — E55.9 VITAMIN D DEFICIENCY, UNSPECIFIED: ICD-10-CM

## 2025-02-18 LAB
25(OH)D3 SERPL-MCNC: 46.2 NG/ML (ref 30–100)
ALBUMIN SERPL-MCNC: 3.4 G/DL (ref 3.5–5)
ALBUMIN/GLOB SERPL: 1.2 (ref 1.1–2.2)
ALP SERPL-CCNC: 41 U/L (ref 45–117)
ALT SERPL-CCNC: 15 U/L (ref 12–78)
ANION GAP SERPL CALC-SCNC: 4 MMOL/L (ref 2–12)
AST SERPL-CCNC: 27 U/L (ref 15–37)
BASOPHILS # BLD: 0.1 K/UL (ref 0–0.1)
BASOPHILS NFR BLD: 2 % (ref 0–1)
BILIRUB SERPL-MCNC: 0.4 MG/DL (ref 0.2–1)
BUN SERPL-MCNC: 26 MG/DL (ref 6–20)
BUN/CREAT SERPL: 20 (ref 12–20)
CALCIUM SERPL-MCNC: 9.1 MG/DL (ref 8.5–10.1)
CHLORIDE SERPL-SCNC: 111 MMOL/L (ref 97–108)
CHOLEST SERPL-MCNC: 117 MG/DL
CO2 SERPL-SCNC: 26 MMOL/L (ref 21–32)
CREAT SERPL-MCNC: 1.29 MG/DL (ref 0.55–1.02)
DIFFERENTIAL METHOD BLD: ABNORMAL
EOSINOPHIL # BLD: 0.09 K/UL (ref 0–0.4)
EOSINOPHIL NFR BLD: 1.8 % (ref 0–7)
ERYTHROCYTE [DISTWIDTH] IN BLOOD BY AUTOMATED COUNT: 14.8 % (ref 11.5–14.5)
EST. AVERAGE GLUCOSE BLD GHB EST-MCNC: 114 MG/DL
GLOBULIN SER CALC-MCNC: 2.8 G/DL (ref 2–4)
GLUCOSE SERPL-MCNC: 77 MG/DL (ref 65–100)
HBA1C MFR BLD: 5.6 % (ref 4–5.6)
HCT VFR BLD AUTO: 43.4 % (ref 35–47)
HDLC SERPL-MCNC: 49 MG/DL
HDLC SERPL: 2.4 (ref 0–5)
HGB BLD-MCNC: 14 G/DL (ref 11.5–16)
IMM GRANULOCYTES # BLD AUTO: 0.02 K/UL (ref 0–0.04)
IMM GRANULOCYTES NFR BLD AUTO: 0.4 % (ref 0–0.5)
LDLC SERPL CALC-MCNC: 48.6 MG/DL (ref 0–100)
LYMPHOCYTES # BLD: 1.62 K/UL (ref 0.8–3.5)
LYMPHOCYTES NFR BLD: 32.1 % (ref 12–49)
MCH RBC QN AUTO: 30.4 PG (ref 26–34)
MCHC RBC AUTO-ENTMCNC: 32.3 G/DL (ref 30–36.5)
MCV RBC AUTO: 94.1 FL (ref 80–99)
MONOCYTES # BLD: 0.45 K/UL (ref 0–1)
MONOCYTES NFR BLD: 8.9 % (ref 5–13)
NEUTS SEG # BLD: 2.77 K/UL (ref 1.8–8)
NEUTS SEG NFR BLD: 54.8 % (ref 32–75)
NRBC # BLD: 0 K/UL (ref 0–0.01)
NRBC BLD-RTO: 0 PER 100 WBC
PLATELET # BLD AUTO: 170 K/UL (ref 150–400)
PMV BLD AUTO: 10.6 FL (ref 8.9–12.9)
POTASSIUM SERPL-SCNC: 4.4 MMOL/L (ref 3.5–5.1)
PROT SERPL-MCNC: 6.2 G/DL (ref 6.4–8.2)
RBC # BLD AUTO: 4.61 M/UL (ref 3.8–5.2)
SODIUM SERPL-SCNC: 141 MMOL/L (ref 136–145)
T4 FREE SERPL-MCNC: 1 NG/DL (ref 0.8–1.5)
TRIGL SERPL-MCNC: 97 MG/DL
TSH SERPL DL<=0.05 MIU/L-ACNC: 0.46 UIU/ML (ref 0.36–3.74)
VLDLC SERPL CALC-MCNC: 19.4 MG/DL
WBC # BLD AUTO: 5.1 K/UL (ref 3.6–11)

## 2025-02-19 ENCOUNTER — TELEPHONE (OUTPATIENT)
Age: 89
End: 2025-02-19

## 2025-02-19 NOTE — TELEPHONE ENCOUNTER
----- Message from Dr. Farzana Meade MD sent at 2/18/2025  5:18 PM EST -----  Please let patient know labs are stable.   Will discuss further with her follow up visit on 2/24/2025.   Thanks!

## 2025-02-21 SDOH — ECONOMIC STABILITY: FOOD INSECURITY: WITHIN THE PAST 12 MONTHS, YOU WORRIED THAT YOUR FOOD WOULD RUN OUT BEFORE YOU GOT MONEY TO BUY MORE.: NEVER TRUE

## 2025-02-21 SDOH — ECONOMIC STABILITY: FOOD INSECURITY: WITHIN THE PAST 12 MONTHS, THE FOOD YOU BOUGHT JUST DIDN'T LAST AND YOU DIDN'T HAVE MONEY TO GET MORE.: NEVER TRUE

## 2025-02-21 SDOH — HEALTH STABILITY: PHYSICAL HEALTH: ON AVERAGE, HOW MANY DAYS PER WEEK DO YOU ENGAGE IN MODERATE TO STRENUOUS EXERCISE (LIKE A BRISK WALK)?: 0 DAYS

## 2025-02-21 SDOH — ECONOMIC STABILITY: TRANSPORTATION INSECURITY
IN THE PAST 12 MONTHS, HAS THE LACK OF TRANSPORTATION KEPT YOU FROM MEDICAL APPOINTMENTS OR FROM GETTING MEDICATIONS?: NO

## 2025-02-21 SDOH — ECONOMIC STABILITY: INCOME INSECURITY: IN THE LAST 12 MONTHS, WAS THERE A TIME WHEN YOU WERE NOT ABLE TO PAY THE MORTGAGE OR RENT ON TIME?: NO

## 2025-02-21 SDOH — HEALTH STABILITY: PHYSICAL HEALTH: ON AVERAGE, HOW MANY MINUTES DO YOU ENGAGE IN EXERCISE AT THIS LEVEL?: 0 MIN

## 2025-02-21 SDOH — ECONOMIC STABILITY: TRANSPORTATION INSECURITY
IN THE PAST 12 MONTHS, HAS LACK OF TRANSPORTATION KEPT YOU FROM MEETINGS, WORK, OR FROM GETTING THINGS NEEDED FOR DAILY LIVING?: NO

## 2025-02-21 ASSESSMENT — LIFESTYLE VARIABLES
HOW OFTEN DO YOU HAVE SIX OR MORE DRINKS ON ONE OCCASION: 1
HOW MANY STANDARD DRINKS CONTAINING ALCOHOL DO YOU HAVE ON A TYPICAL DAY: 0
HOW OFTEN DO YOU HAVE A DRINK CONTAINING ALCOHOL: NEVER
HOW OFTEN DO YOU HAVE A DRINK CONTAINING ALCOHOL: 1
HOW MANY STANDARD DRINKS CONTAINING ALCOHOL DO YOU HAVE ON A TYPICAL DAY: PATIENT DOES NOT DRINK

## 2025-02-21 ASSESSMENT — PATIENT HEALTH QUESTIONNAIRE - PHQ9
SUM OF ALL RESPONSES TO PHQ QUESTIONS 1-9: 1
SUM OF ALL RESPONSES TO PHQ QUESTIONS 1-9: 1
SUM OF ALL RESPONSES TO PHQ9 QUESTIONS 1 & 2: 1
2. FEELING DOWN, DEPRESSED OR HOPELESS: NOT AT ALL
1. LITTLE INTEREST OR PLEASURE IN DOING THINGS: SEVERAL DAYS
SUM OF ALL RESPONSES TO PHQ QUESTIONS 1-9: 1
SUM OF ALL RESPONSES TO PHQ QUESTIONS 1-9: 1

## 2025-02-24 ENCOUNTER — OFFICE VISIT (OUTPATIENT)
Age: 89
End: 2025-02-24
Payer: MEDICARE

## 2025-02-24 ENCOUNTER — TELEPHONE (OUTPATIENT)
Age: 89
End: 2025-02-24

## 2025-02-24 VITALS
RESPIRATION RATE: 16 BRPM | HEIGHT: 64 IN | SYSTOLIC BLOOD PRESSURE: 118 MMHG | OXYGEN SATURATION: 98 % | BODY MASS INDEX: 19.81 KG/M2 | HEART RATE: 72 BPM | DIASTOLIC BLOOD PRESSURE: 64 MMHG | WEIGHT: 116 LBS | TEMPERATURE: 97.8 F

## 2025-02-24 DIAGNOSIS — M54.42 CHRONIC MIDLINE LOW BACK PAIN WITH BILATERAL SCIATICA: ICD-10-CM

## 2025-02-24 DIAGNOSIS — M54.41 CHRONIC MIDLINE LOW BACK PAIN WITH BILATERAL SCIATICA: ICD-10-CM

## 2025-02-24 DIAGNOSIS — G89.29 CHRONIC MIDLINE LOW BACK PAIN WITH BILATERAL SCIATICA: ICD-10-CM

## 2025-02-24 DIAGNOSIS — Z00.00 MEDICARE ANNUAL WELLNESS VISIT, SUBSEQUENT: Primary | ICD-10-CM

## 2025-02-24 DIAGNOSIS — J06.9 URI WITH COUGH AND CONGESTION: Primary | ICD-10-CM

## 2025-02-24 DIAGNOSIS — J06.9 VIRAL URI: ICD-10-CM

## 2025-02-24 PROCEDURE — 99213 OFFICE O/P EST LOW 20 MIN: CPT | Performed by: INTERNAL MEDICINE

## 2025-02-24 RX ORDER — GABAPENTIN 100 MG/1
100 CAPSULE ORAL NIGHTLY
Qty: 30 CAPSULE | Refills: 0 | Status: SHIPPED | OUTPATIENT
Start: 2025-02-24 | End: 2025-03-26

## 2025-02-24 RX ORDER — AZITHROMYCIN 250 MG/1
TABLET, FILM COATED ORAL
Qty: 6 TABLET | Refills: 0 | Status: SHIPPED | OUTPATIENT
Start: 2025-02-24 | End: 2025-03-06

## 2025-02-24 NOTE — TELEPHONE ENCOUNTER
Daughter called because patient is having issues. She is very weak, shaky,  stumbling, and coughing really bad. She is not sure if she should give her the medication that was prescribed this morning. She did take 2 Corcidin, but Nelida wasn't sure if that was ok. She would like to talk to someone. Please advise.    Thank you!

## 2025-02-24 NOTE — PROGRESS NOTES
Identified pt with two pt identifiers(name and )    Chief Complaint   Patient presents with    Medicare AWV    Cold Symptoms     Pt is having runny nose, coughing, headaches on and off, symptoms just started this morning    Back Pain     Pt would like to discuss ongoing back pain that's been going on for years        Health Maintenance Due   Topic    DTaP/Tdap/Td vaccine (1 - Tdap)    Shingles vaccine (2 of 3)    Annual Wellness Visit (Medicare)        Wt Readings from Last 3 Encounters:   25 52.6 kg (116 lb)   10/08/24 52.2 kg (115 lb)   24 51 kg (112 lb 6.4 oz)     Temp Readings from Last 3 Encounters:   25 97.8 °F (36.6 °C) (Temporal)   24 97.3 °F (36.3 °C) (Temporal)   10/08/24 97.1 °F (36.2 °C) (Temporal)     BP Readings from Last 3 Encounters:   25 118/64   24 119/66   10/08/24 126/86     Pulse Readings from Last 3 Encounters:   25 72   24 62   10/08/24 65           Depression Screening:  :         2025     8:10 PM 10/8/2024    12:59 PM 2024     1:32 PM 2023     1:37 PM 2022     1:00 PM 3/14/2022     2:00 PM   PHQ-9 Questionaire   Little interest or pleasure in doing things 1 0 0 0 0 0   Feeling down, depressed, or hopeless 0 0 0 0 0 0   PHQ-9 Total Score 1 0 0 0 0 0        Fall Risk Assessment:  :         2025     8:10 PM 2024     1:27 PM   Fall Risk   Do you feel unsteady or are you worried about falling?  yes yes   2 or more falls in past year? no no   Fall with injury in past year? no no        Abuse Screening:  :          No data to display                 Coordination of Care Questionnaire:  :     \"Have you been to the ER, urgent care clinic since your last visit?  Hospitalized since your last visit?\"    NO    “Have you seen or consulted any other health care providers outside our system since your last visit?”    NO        Click Here for Release of Records Request      
could not tolerate most medications. Has never tried Gabapentin. Potential side effects discussed in detail.     She also has mild URI with running nose and dry cough. No fever or chills. No other constitutional symptoms.     She had her labs done prior to her visit today and they were normal without any major concerns or issues.     Patient's complete Health Risk Assessment and screening values have been reviewed and are found in Flowsheets. The following problems were reviewed today and where indicated follow up appointments were made and/or referrals ordered.    Positive Risk Factor Screenings with Interventions:    Fall Risk:  Do you feel unsteady or are you worried about falling? : (!) yes  2 or more falls in past year?: no  Fall with injury in past year?: no     Interventions:    Reviewed medications, home hazards, visual acuity, and co-morbidities that can increase risk for falls    Cognitive:   Clock Drawing Test (CDT): (!) Abnormal  Words recalled: 0 Words Recalled  Total Score: (!) 0  Total Score Interpretation: Abnormal Mini-Cog  Interventions:  The patient has a difficult time seeing and hearing and probably failed the test because of this.             Inactivity:  On average, how many days per week do you engage in moderate to strenuous exercise (like a brisk walk)?: 0 days (!) Abnormal  On average, how many minutes do you engage in exercise at this level?: 0 min  Interventions:  Recommendations: patient agrees to increase physical activity as follows: she is able.     Poor Eating Habits/Diet:  Do you eat balanced/healthy meals regularly?: (!) No  Interventions:  See AVS for additional education material       Vision Screen:  Do you have difficulty driving, watching TV, or doing any of your daily activities because of your eyesight?: (!) Yes  Have you had an eye exam within the past year?: Yes  Interventions:   See AVS for additional education material      Advanced Directives:  Do you have a Living

## 2025-02-25 DIAGNOSIS — U07.1 POSITIVE SELF-ADMINISTERED ANTIGEN TEST FOR COVID-19: Primary | ICD-10-CM

## 2025-02-25 NOTE — TELEPHONE ENCOUNTER
Patient has tested positive for Covid. Daughter would like to know if PCP would like to send Paxlovid to pharmacy?     Daughter would also like to know if it's ok for patient to start Gabapentin now or should she wait until she is feeling a little better and has completely regained her strength?     [facility] [address] [place] [address] [address] - P [phone] - F [phone]

## 2025-03-19 ENCOUNTER — TELEMEDICINE (OUTPATIENT)
Age: 89
End: 2025-03-19
Payer: MEDICARE

## 2025-03-19 DIAGNOSIS — M81.8 OTHER OSTEOPOROSIS WITHOUT CURRENT PATHOLOGICAL FRACTURE: ICD-10-CM

## 2025-03-19 DIAGNOSIS — M80.00XS AGE-RELATED OSTEOPOROSIS WITH CURRENT PATHOLOGICAL FRACTURE, SEQUELA: Primary | ICD-10-CM

## 2025-03-19 DIAGNOSIS — E78.00 PURE HYPERCHOLESTEROLEMIA, UNSPECIFIED: ICD-10-CM

## 2025-03-19 PROCEDURE — G2211 COMPLEX E/M VISIT ADD ON: HCPCS | Performed by: INTERNAL MEDICINE

## 2025-03-19 PROCEDURE — 99214 OFFICE O/P EST MOD 30 MIN: CPT | Performed by: INTERNAL MEDICINE

## 2025-03-19 NOTE — PROGRESS NOTES
discontinued or the risk of fracture increases dramatically.  We will  plan on reassessing DEXA in April 2025, 2 years after the last. Remove loose rugs from home, encourage \"sittersize\" for R extensor strengthening given R foot impairments.       #osteoporosis   -s/p 5 years of bisphosphonate   -Most recent Prolia June 2024, will be due in December   -Repeat DEXA in April 2023 with improvement in lumbar spine, reevaluate April or May 2025 to determine if we can consolidate Prolia with Reclast   -discussed risk of hypOcalcemia with this med, presents as perioral numbness and numbness in fingertips   -would be very surprised if this occurred given normocalcemia and normal renal fx currently   -continue 2x daily calcium supplementation at different times, look for calcium fortified cereals       Copy sent to:Farzana Meade MD      Return to care in 6 mo      Megan Saenz MD   [facility]       [phone] ** Wilma Marley best number to call to schedule prolia.    Jazmyn Simon, was evaluated through a synchronous (real-time) telephone encounter. The patient (or guardian if applicable) is aware that this is a billable service, which includes applicable co-pays. This Virtual Visit was conducted with patient's (and/or legal guardian's) consent. Patient identification was verified, and a caregiver was present when appropriate.   The patient was located at Home: [address] [place] [address]  Provider was located at Facility (Appt Dept): [address] [place] [address]  Confirm you are appropriately licensed, registered, or certified to deliver care in the state where the patient is located as indicated above. If you are not or unsure, please re-schedule the visit: Yes, I confirm.      Total time spent for this encounter:  20 mins    --Megan Saenz MD on 3/19/2025 at 11:47 AM    An electronic signature was used to authenticate this note.

## 2025-03-20 RX ORDER — ROSUVASTATIN CALCIUM 20 MG/1
20 TABLET, COATED ORAL DAILY
Qty: 90 TABLET | Refills: 0 | Status: SHIPPED | OUTPATIENT
Start: 2025-03-20

## 2025-04-04 DIAGNOSIS — I10 PRIMARY HYPERTENSION: ICD-10-CM

## 2025-04-04 RX ORDER — LOSARTAN POTASSIUM 25 MG/1
25 TABLET ORAL DAILY
Qty: 90 TABLET | Refills: 0 | Status: SHIPPED | OUTPATIENT
Start: 2025-04-04

## 2025-04-21 DIAGNOSIS — M54.42 CHRONIC MIDLINE LOW BACK PAIN WITH BILATERAL SCIATICA: ICD-10-CM

## 2025-04-21 DIAGNOSIS — G89.29 CHRONIC MIDLINE LOW BACK PAIN WITH BILATERAL SCIATICA: ICD-10-CM

## 2025-04-21 DIAGNOSIS — M54.41 CHRONIC MIDLINE LOW BACK PAIN WITH BILATERAL SCIATICA: ICD-10-CM

## 2025-04-22 RX ORDER — GABAPENTIN 100 MG/1
100 CAPSULE ORAL NIGHTLY
Qty: 90 CAPSULE | Refills: 0 | Status: SHIPPED | OUTPATIENT
Start: 2025-04-22 | End: 2025-07-21

## 2025-04-28 RX ORDER — LEVOTHYROXINE SODIUM 50 UG/1
50 TABLET ORAL
Qty: 90 TABLET | Refills: 3 | Status: SHIPPED | OUTPATIENT
Start: 2025-04-28

## 2025-04-29 ENCOUNTER — TELEPHONE (OUTPATIENT)
Age: 89
End: 2025-04-29

## 2025-04-29 NOTE — TELEPHONE ENCOUNTER
Nelida, pt's daughter called and she wanted to know of the location of where her mother had her last bone density scan. N[facility] the [facility] so that she will me made aware of the exact location of the facility.

## 2025-05-27 NOTE — PRE-PROCEDURE INSTRUCTIONS
5/27/2025  1:43 p.m.  Spoke to patient's daughter and confirmed 1:45 arrival time for appointment scheduled on 5/28/2025; instructions given.

## 2025-05-28 ENCOUNTER — HOSPITAL ENCOUNTER (OUTPATIENT)
Facility: HOSPITAL | Age: 89
Discharge: HOME | End: 2025-05-31
Attending: INTERNAL MEDICINE
Payer: MEDICARE

## 2025-05-28 DIAGNOSIS — M81.8 OTHER OSTEOPOROSIS WITHOUT CURRENT PATHOLOGICAL FRACTURE: ICD-10-CM

## 2025-05-28 DIAGNOSIS — M80.00XS AGE-RELATED OSTEOPOROSIS WITH CURRENT PATHOLOGICAL FRACTURE, SEQUELA: ICD-10-CM

## 2025-05-28 PROCEDURE — 77080 DXA BONE DENSITY AXIAL: CPT

## 2025-06-05 ENCOUNTER — RESULTS FOLLOW-UP (OUTPATIENT)
Age: 89
End: 2025-06-05

## 2025-06-17 DIAGNOSIS — M80.00XS AGE-RELATED OSTEOPOROSIS WITH CURRENT PATHOLOGICAL FRACTURE, SEQUELA: Primary | ICD-10-CM

## 2025-06-17 RX ORDER — FAMOTIDINE 10 MG/ML
20 INJECTION, SOLUTION INTRAVENOUS
OUTPATIENT
Start: 2025-06-22

## 2025-06-17 RX ORDER — DIPHENHYDRAMINE HYDROCHLORIDE 50 MG/ML
50 INJECTION, SOLUTION INTRAMUSCULAR; INTRAVENOUS
OUTPATIENT
Start: 2025-06-22

## 2025-06-17 RX ORDER — ALBUTEROL SULFATE 90 UG/1
4 INHALANT RESPIRATORY (INHALATION) PRN
OUTPATIENT
Start: 2025-06-22

## 2025-06-17 RX ORDER — SODIUM CHLORIDE 9 MG/ML
INJECTION, SOLUTION INTRAVENOUS CONTINUOUS
OUTPATIENT
Start: 2025-06-22

## 2025-06-17 RX ORDER — ACETAMINOPHEN 325 MG/1
650 TABLET ORAL
OUTPATIENT
Start: 2025-06-22

## 2025-06-17 RX ORDER — EPINEPHRINE 1 MG/ML
0.3 INJECTION, SOLUTION, CONCENTRATE INTRAVENOUS PRN
OUTPATIENT
Start: 2025-06-22

## 2025-06-17 RX ORDER — HYDROCORTISONE SODIUM SUCCINATE 100 MG/2ML
100 INJECTION INTRAMUSCULAR; INTRAVENOUS
OUTPATIENT
Start: 2025-06-22

## 2025-06-17 RX ORDER — ONDANSETRON 2 MG/ML
8 INJECTION INTRAMUSCULAR; INTRAVENOUS
OUTPATIENT
Start: 2025-06-22

## 2025-06-24 ENCOUNTER — HOSPITAL ENCOUNTER (OUTPATIENT)
Facility: HOSPITAL | Age: 89
Setting detail: INFUSION SERIES
Discharge: HOME | End: 2025-06-24
Payer: MEDICARE

## 2025-06-24 VITALS
TEMPERATURE: 97.8 F | RESPIRATION RATE: 17 BRPM | DIASTOLIC BLOOD PRESSURE: 64 MMHG | OXYGEN SATURATION: 95 % | SYSTOLIC BLOOD PRESSURE: 112 MMHG | HEART RATE: 66 BPM

## 2025-06-24 DIAGNOSIS — I10 PRIMARY HYPERTENSION: ICD-10-CM

## 2025-06-24 DIAGNOSIS — M80.00XS AGE-RELATED OSTEOPOROSIS WITH CURRENT PATHOLOGICAL FRACTURE, SEQUELA: Primary | ICD-10-CM

## 2025-06-24 DIAGNOSIS — E78.00 PURE HYPERCHOLESTEROLEMIA, UNSPECIFIED: ICD-10-CM

## 2025-06-24 LAB
ALBUMIN SERPL-MCNC: 3.2 G/DL (ref 3.5–5)
ALBUMIN/GLOB SERPL: 1.1 (ref 1.1–2.2)
ALP SERPL-CCNC: 49 U/L (ref 45–117)
ALT SERPL-CCNC: 18 U/L (ref 12–78)
ANION GAP BLD CALC-SCNC: 3.6 MMOL/L (ref 10–20)
ANION GAP SERPL CALC-SCNC: 5 MMOL/L (ref 2–12)
AST SERPL-CCNC: 24 U/L (ref 15–37)
BILIRUB SERPL-MCNC: 0.2 MG/DL (ref 0.2–1)
BUN SERPL-MCNC: 17 MG/DL (ref 6–20)
BUN/CREAT SERPL: 13 (ref 12–20)
CA-I BLD-MCNC: 1.21 MMOL/L (ref 1.15–1.33)
CALCIUM SERPL-MCNC: 8.9 MG/DL (ref 8.5–10.1)
CHLORIDE BLD-SCNC: 108 MMOL/L (ref 98–107)
CHLORIDE SERPL-SCNC: 108 MMOL/L (ref 97–108)
CO2 BLD-SCNC: 29.4 MMOL/L (ref 21–32)
CO2 SERPL-SCNC: 27 MMOL/L (ref 21–32)
CREAT BLD-MCNC: 1.22 MG/DL (ref 0.6–1.3)
CREAT SERPL-MCNC: 1.26 MG/DL (ref 0.55–1.02)
GLOBULIN SER CALC-MCNC: 2.9 G/DL (ref 2–4)
GLUCOSE BLD-MCNC: 95 MG/DL (ref 74–99)
GLUCOSE SERPL-MCNC: 87 MG/DL (ref 65–100)
PHOSPHATE SERPL-MCNC: 3.7 MG/DL (ref 2.6–4.7)
POTASSIUM BLD-SCNC: 4.2 MMOL/L (ref 3.5–5.1)
POTASSIUM SERPL-SCNC: 4.6 MMOL/L (ref 3.5–5.1)
PROT SERPL-MCNC: 6.1 G/DL (ref 6.4–8.2)
SERVICE CMNT-IMP: ABNORMAL
SODIUM BLD-SCNC: 141 MMOL/L (ref 136–145)
SODIUM SERPL-SCNC: 140 MMOL/L (ref 136–145)

## 2025-06-24 PROCEDURE — 80053 COMPREHEN METABOLIC PANEL: CPT

## 2025-06-24 PROCEDURE — 80047 BASIC METABLC PNL IONIZED CA: CPT

## 2025-06-24 PROCEDURE — 6360000002 HC RX W HCPCS: Performed by: INTERNAL MEDICINE

## 2025-06-24 PROCEDURE — 96372 THER/PROPH/DIAG INJ SC/IM: CPT

## 2025-06-24 PROCEDURE — 84100 ASSAY OF PHOSPHORUS: CPT

## 2025-06-24 RX ORDER — SODIUM CHLORIDE 9 MG/ML
INJECTION, SOLUTION INTRAVENOUS CONTINUOUS
OUTPATIENT
Start: 2025-12-21

## 2025-06-24 RX ORDER — DIPHENHYDRAMINE HYDROCHLORIDE 50 MG/ML
50 INJECTION, SOLUTION INTRAMUSCULAR; INTRAVENOUS
OUTPATIENT
Start: 2025-12-21

## 2025-06-24 RX ORDER — ACETAMINOPHEN 325 MG/1
650 TABLET ORAL
OUTPATIENT
Start: 2025-12-21

## 2025-06-24 RX ORDER — EPINEPHRINE 1 MG/ML
0.3 INJECTION, SOLUTION INTRAMUSCULAR; SUBCUTANEOUS PRN
OUTPATIENT
Start: 2025-12-21

## 2025-06-24 RX ORDER — HYDROCORTISONE SODIUM SUCCINATE 100 MG/2ML
100 INJECTION INTRAMUSCULAR; INTRAVENOUS
OUTPATIENT
Start: 2025-12-21

## 2025-06-24 RX ORDER — ONDANSETRON 2 MG/ML
8 INJECTION INTRAMUSCULAR; INTRAVENOUS
OUTPATIENT
Start: 2025-12-21

## 2025-06-24 RX ORDER — ALBUTEROL SULFATE 90 UG/1
4 INHALANT RESPIRATORY (INHALATION) PRN
OUTPATIENT
Start: 2025-12-21

## 2025-06-24 RX ADMIN — DENOSUMAB 60 MG: 60 INJECTION SUBCUTANEOUS at 14:45

## 2025-06-24 ASSESSMENT — PAIN SCALES - GENERAL: PAINLEVEL_OUTOF10: 0

## 2025-06-24 NOTE — PROGRESS NOTES
hospitals Progress Note    Date: 2025    Name: Jazmyn Simon    MRN: [id number]         : 1927    Ms. Simon Arrived ambulatory and in no distress for Prolia.      Assessment was completed and documented in flowsheets. No acute concerns at this time. Labs drawn peripherally from the left ac.     Ms. Simon's vital signs for this visit.  Vitals:    25 1410   BP: 112/64   Pulse: 66   Resp: 17   Temp: 97.8 °F (36.6 °C)   SpO2: 95%          Lab results were obtained and reviewed. Criteria Met for Injection.   Recent Results (from the past 12 hours)   POC CHEM 8    Collection Time: 25  2:26 PM   Result Value Ref Range    POC Ionized Calcium 1.21 1.15 - 1.33 mmol/L    POC Sodium 141 136 - 145 mmol/L    POC Potassium 4.2 3.5 - 5.1 mmol/L    POC Chloride 108 (H) 98 - 107 mmol/L    POC TCO2 29.4 21 - 32 mmol/L    Anion Gap, POC 3.6 (L) 10 - 20 mmol/L    POC Glucose 95 74 - 99 mg/dL    POC Creatinine 1.22 0.6 - 1.3 mg/dL    eGFR, POC 40 (L) >60 ml/min/1.73m2    UA Comment Comment Not Indicated.         Medications given:   Medications Administered         denosumab (PROLIA) SC injection 60 mg Admin Date  2025 Action  Given Dose  60 mg Route  SubCUTAneous Documented By  Elizabeth Rodriguez RN        Given SC in the left arm    Ms. Simon tolerated the injection, and had no complaints.    [facility]     Future Appointments   Date Time Provider Department Center   2025 11:00 AM Farzana Meade MD [facility] E[facility] DEP   2025 11:10 AM Megan Saenz MD RDE [facility] BS [facility]   2025  1:00 PM DANIEL SO CHAIR 3 ANNMARIE [facility]       Elizabeth Rodriguez RN  2025  3:13 PM

## 2025-06-25 RX ORDER — ROSUVASTATIN CALCIUM 20 MG/1
20 TABLET, COATED ORAL DAILY
Qty: 90 TABLET | Refills: 0 | Status: SHIPPED | OUTPATIENT
Start: 2025-06-25

## 2025-06-25 RX ORDER — LOSARTAN POTASSIUM 25 MG/1
25 TABLET ORAL DAILY
Qty: 90 TABLET | Refills: 0 | Status: SHIPPED | OUTPATIENT
Start: 2025-06-25

## 2025-07-01 DIAGNOSIS — I10 PRIMARY HYPERTENSION: ICD-10-CM

## 2025-07-01 RX ORDER — LOSARTAN POTASSIUM 25 MG/1
25 TABLET ORAL DAILY
Qty: 90 TABLET | Refills: 0 | Status: SHIPPED | OUTPATIENT
Start: 2025-07-01

## 2025-07-11 ENCOUNTER — RESULTS FOLLOW-UP (OUTPATIENT)
Age: 89
End: 2025-07-11

## 2025-08-05 DIAGNOSIS — M54.41 CHRONIC MIDLINE LOW BACK PAIN WITH BILATERAL SCIATICA: ICD-10-CM

## 2025-08-05 DIAGNOSIS — G89.29 CHRONIC MIDLINE LOW BACK PAIN WITH BILATERAL SCIATICA: ICD-10-CM

## 2025-08-05 DIAGNOSIS — M54.42 CHRONIC MIDLINE LOW BACK PAIN WITH BILATERAL SCIATICA: ICD-10-CM

## 2025-08-05 RX ORDER — GABAPENTIN 100 MG/1
100 CAPSULE ORAL NIGHTLY
Qty: 90 CAPSULE | Refills: 0 | Status: SHIPPED | OUTPATIENT
Start: 2025-08-05 | End: 2025-11-03

## 2025-08-19 ENCOUNTER — OFFICE VISIT (OUTPATIENT)
Age: 89
End: 2025-08-19
Payer: MEDICARE

## 2025-08-19 ENCOUNTER — APPOINTMENT (OUTPATIENT)
Age: 89
End: 2025-08-19
Payer: MEDICARE

## 2025-08-19 VITALS
BODY MASS INDEX: 19.77 KG/M2 | DIASTOLIC BLOOD PRESSURE: 60 MMHG | TEMPERATURE: 97.8 F | HEIGHT: 64 IN | RESPIRATION RATE: 16 BRPM | WEIGHT: 115.8 LBS | SYSTOLIC BLOOD PRESSURE: 98 MMHG

## 2025-08-19 DIAGNOSIS — G89.29 CHRONIC MIDLINE LOW BACK PAIN WITH BILATERAL SCIATICA: Primary | ICD-10-CM

## 2025-08-19 DIAGNOSIS — M54.42 CHRONIC MIDLINE LOW BACK PAIN WITH BILATERAL SCIATICA: Primary | ICD-10-CM

## 2025-08-19 DIAGNOSIS — M54.41 CHRONIC MIDLINE LOW BACK PAIN WITH BILATERAL SCIATICA: Primary | ICD-10-CM

## 2025-08-19 PROCEDURE — 1036F TOBACCO NON-USER: CPT | Performed by: INTERNAL MEDICINE

## 2025-08-19 PROCEDURE — G8420 CALC BMI NORM PARAMETERS: HCPCS | Performed by: INTERNAL MEDICINE

## 2025-08-19 PROCEDURE — 1160F RVW MEDS BY RX/DR IN RCRD: CPT | Performed by: INTERNAL MEDICINE

## 2025-08-19 PROCEDURE — 1123F ACP DISCUSS/DSCN MKR DOCD: CPT | Performed by: INTERNAL MEDICINE

## 2025-08-19 PROCEDURE — 1090F PRES/ABSN URINE INCON ASSESS: CPT | Performed by: INTERNAL MEDICINE

## 2025-08-19 PROCEDURE — 99213 OFFICE O/P EST LOW 20 MIN: CPT | Performed by: INTERNAL MEDICINE

## 2025-08-19 PROCEDURE — 1159F MED LIST DOCD IN RCRD: CPT | Performed by: INTERNAL MEDICINE

## 2025-08-19 PROCEDURE — G8427 DOCREV CUR MEDS BY ELIG CLIN: HCPCS | Performed by: INTERNAL MEDICINE

## 2025-08-19 RX ORDER — GABAPENTIN 100 MG/1
200 CAPSULE ORAL NIGHTLY
Qty: 180 CAPSULE | Refills: 0 | Status: SHIPPED | OUTPATIENT
Start: 2025-08-19 | End: 2026-02-15